# Patient Record
Sex: MALE | Race: WHITE | NOT HISPANIC OR LATINO | Employment: FULL TIME | URBAN - METROPOLITAN AREA
[De-identification: names, ages, dates, MRNs, and addresses within clinical notes are randomized per-mention and may not be internally consistent; named-entity substitution may affect disease eponyms.]

---

## 2017-07-17 ENCOUNTER — PATIENT OUTREACH (OUTPATIENT)
Dept: ADMINISTRATIVE | Facility: HOSPITAL | Age: 52
End: 2017-07-17

## 2017-07-17 ENCOUNTER — PATIENT MESSAGE (OUTPATIENT)
Dept: ADMINISTRATIVE | Facility: HOSPITAL | Age: 52
End: 2017-07-17

## 2017-11-02 ENCOUNTER — TELEPHONE (OUTPATIENT)
Dept: FAMILY MEDICINE | Facility: CLINIC | Age: 52
End: 2017-11-02

## 2017-11-02 ENCOUNTER — OFFICE VISIT (OUTPATIENT)
Dept: FAMILY MEDICINE | Facility: CLINIC | Age: 52
End: 2017-11-02
Payer: COMMERCIAL

## 2017-11-02 VITALS
WEIGHT: 172.69 LBS | HEIGHT: 69 IN | DIASTOLIC BLOOD PRESSURE: 84 MMHG | SYSTOLIC BLOOD PRESSURE: 120 MMHG | HEART RATE: 74 BPM | BODY MASS INDEX: 25.58 KG/M2 | RESPIRATION RATE: 18 BRPM | OXYGEN SATURATION: 98 %

## 2017-11-02 DIAGNOSIS — G62.9 NEUROPATHY: ICD-10-CM

## 2017-11-02 DIAGNOSIS — Z00.00 ANNUAL PHYSICAL EXAM: Primary | ICD-10-CM

## 2017-11-02 DIAGNOSIS — Z98.1 S/P CERVICAL SPINAL FUSION: ICD-10-CM

## 2017-11-02 DIAGNOSIS — Z13.220 SCREENING FOR LIPID DISORDERS: ICD-10-CM

## 2017-11-02 DIAGNOSIS — R25.2 MUSCLE CRAMPING: ICD-10-CM

## 2017-11-02 DIAGNOSIS — M25.50 ARTHRALGIA, UNSPECIFIED JOINT: ICD-10-CM

## 2017-11-02 PROBLEM — Z98.890 PERSONAL HISTORY OF SPINE SURGERY: Status: ACTIVE | Noted: 2017-11-02

## 2017-11-02 PROCEDURE — 90715 TDAP VACCINE 7 YRS/> IM: CPT | Mod: S$GLB,,, | Performed by: FAMILY MEDICINE

## 2017-11-02 PROCEDURE — 90471 IMMUNIZATION ADMIN: CPT | Mod: S$GLB,,, | Performed by: FAMILY MEDICINE

## 2017-11-02 PROCEDURE — 99999 PR PBB SHADOW E&M-EST. PATIENT-LVL III: CPT | Mod: PBBFAC,,, | Performed by: FAMILY MEDICINE

## 2017-11-02 PROCEDURE — 99396 PREV VISIT EST AGE 40-64: CPT | Mod: 25,S$GLB,, | Performed by: FAMILY MEDICINE

## 2017-11-02 RX ORDER — NAPROXEN 500 MG/1
500 TABLET ORAL 2 TIMES DAILY
Qty: 30 TABLET | Refills: 0 | Status: SHIPPED | OUTPATIENT
Start: 2017-11-02 | End: 2023-11-13

## 2017-11-02 RX ORDER — DICLOFENAC SODIUM 10 MG/G
2 GEL TOPICAL 4 TIMES DAILY
Qty: 100 G | Refills: 0 | Status: SHIPPED | OUTPATIENT
Start: 2017-11-02 | End: 2017-11-12

## 2017-11-02 RX ORDER — NAPROXEN SODIUM 220 MG
220 TABLET ORAL
COMMUNITY
End: 2017-11-02

## 2017-11-02 RX ORDER — CYCLOBENZAPRINE HCL 10 MG
10 TABLET ORAL NIGHTLY PRN
Qty: 30 TABLET | Refills: 0 | Status: SHIPPED | OUTPATIENT
Start: 2017-11-02 | End: 2017-11-12

## 2017-11-02 RX ORDER — LEVOCETIRIZINE DIHYDROCHLORIDE 5 MG/1
5 TABLET, FILM COATED ORAL NIGHTLY
Qty: 30 TABLET | Refills: 1 | Status: SHIPPED | OUTPATIENT
Start: 2017-11-02 | End: 2023-12-01

## 2017-11-02 NOTE — PROGRESS NOTES
Subjective:       Patient ID: Juarez Keane is a 52 y.o. male.    Chief Complaint: Annual Exam    HPI 52 year old male here for his annual exam.  Patient has a history of C5-C6 fusion from a work related injury. He has residual left arm pain and occasional numbness/tingling in his fingers. He states symptoms are worse at the end of day   Patient had a stress test at [facility] and CT calcium score was normal in 2017.   Patient had a colonoscopy done in 2016 by Andree Leger and patient had normal results.     Review of Systems   Constitutional: Negative for activity change and unexpected weight change.   HENT: Negative for hearing loss, rhinorrhea and trouble swallowing.    Eyes: Negative for discharge and visual disturbance.   Respiratory: Negative for chest tightness and wheezing.    Cardiovascular: Negative for chest pain and palpitations.   Gastrointestinal: Positive for diarrhea. Negative for blood in stool, constipation and vomiting.   Endocrine: Negative for polydipsia and polyuria.   Genitourinary: Negative for difficulty urinating, hematuria and urgency.   Musculoskeletal: Positive for arthralgias, joint swelling and neck pain.   Neurological: Positive for headaches. Negative for weakness.   Psychiatric/Behavioral: Positive for dysphoric mood. Negative for confusion.       Objective:      Vitals:    11/02/17 0820   BP: 120/84   Pulse: 74   Resp: 18     Physical Exam   Constitutional: He is oriented to person, place, and time. He appears well-developed and well-nourished. No distress.   HENT:   Mouth/Throat: Oropharynx is clear and moist. No oropharyngeal exudate.   Eyes: EOM are normal. Right eye exhibits no discharge. Left eye exhibits no discharge.   Neck: Normal range of motion.   Cardiovascular: Normal rate and regular rhythm.    Pulmonary/Chest: Effort normal. He has no wheezes.   Abdominal: Soft. There is no tenderness. There is no guarding.   Lymphadenopathy:     He has no cervical adenopathy.    Neurological: He is alert and oriented to person, place, and time.   Skin: He is not diaphoretic.   Psychiatric: He has a normal mood and affect. His behavior is normal. Judgment and thought content normal.   Vitals reviewed.      Assessment:       1. Annual physical exam    2. Arthralgia, unspecified joint    3. Screening for lipid disorders    4. Muscle cramping    5. Neuropathy    6. S/P cervical spinal fusion        Plan:         1. Annual exam: labs ordered. Encouraged low intensity exercise and well balanced diet.   2. History of cervical fusion, and polyarthralgia: likely due to patient's day to day occupation. I advised on nsaids prn pain and to contact the clinic if symptoms acutely worsen.   3. Screening: colonoscopy records requested. Immunization: tdap today.   Annual physical exam  -     CBC auto differential; Future; Expected date: 11/02/2017  -     Comprehensive metabolic panel; Future; Expected date: 11/02/2017    Arthralgia, unspecified joint    Screening for lipid disorders  -     Lipid panel; Future; Expected date: 11/02/2017    Muscle cramping  -     Comprehensive metabolic panel; Future; Expected date: 11/02/2017  -     TSH; Future; Expected date: 11/16/2017    Neuropathy  -     Comprehensive metabolic panel; Future; Expected date: 11/02/2017  -     TSH; Future; Expected date: 11/16/2017  -     Vitamin B12; Future; Expected date: 11/16/2017  -     Folate; Future; Expected date: 11/16/2017  -     Hemoglobin A1c; Future; Expected date: 11/02/2017    S/P cervical spinal fusion    Other orders  -     diclofenac sodium 1 % Gel; Apply 2 g topically 4 (four) times daily.  Dispense: 100 g; Refill: 0  -     cyclobenzaprine (FLEXERIL) 10 MG tablet; Take 1 tablet (10 mg total) by mouth nightly as needed for Muscle spasms.  Dispense: 30 tablet; Refill: 0  -     (In Office Administered) Tdap Vaccine  -     naproxen (NAPROSYN) 500 MG tablet; Take 1 tablet (500 mg total) by mouth 2 (two) times daily.   Dispense: 30 tablet; Refill: 0  -     levocetirizine (XYZAL) 5 MG tablet; Take 1 tablet (5 mg total) by mouth every evening.  Dispense: 30 tablet; Refill: 1      Return if symptoms worsen or fail to improve.

## 2017-11-02 NOTE — TELEPHONE ENCOUNTER
----- Message from Roma Keane sent at 11/2/2017  3:06 PM CDT -----  Pt is calling back to give information about medical release, Dr Cote (Card) fax is [phone] and Dr Leger (Gastro)  Fax [phone].

## 2018-02-05 PROBLEM — Z00.00 ANNUAL PHYSICAL EXAM: Status: RESOLVED | Noted: 2017-11-02 | Resolved: 2018-02-05

## 2018-11-30 ENCOUNTER — OFFICE VISIT (OUTPATIENT)
Dept: URGENT CARE | Facility: CLINIC | Age: 53
End: 2018-11-30
Payer: COMMERCIAL

## 2018-11-30 VITALS
DIASTOLIC BLOOD PRESSURE: 85 MMHG | HEART RATE: 82 BPM | HEIGHT: 69 IN | BODY MASS INDEX: 25.48 KG/M2 | OXYGEN SATURATION: 98 % | TEMPERATURE: 97 F | SYSTOLIC BLOOD PRESSURE: 134 MMHG | RESPIRATION RATE: 16 BRPM | WEIGHT: 172 LBS

## 2018-11-30 DIAGNOSIS — L03.012 PARONYCHIA OF FINGER OF LEFT HAND: Primary | ICD-10-CM

## 2018-11-30 PROCEDURE — 99214 OFFICE O/P EST MOD 30 MIN: CPT | Mod: 25,S$GLB,, | Performed by: NURSE PRACTITIONER

## 2018-11-30 PROCEDURE — 26010 DRAINAGE OF FINGER ABSCESS: CPT | Mod: LT,S$GLB,, | Performed by: NURSE PRACTITIONER

## 2018-11-30 PROCEDURE — 3008F BODY MASS INDEX DOCD: CPT | Mod: CPTII,S$GLB,, | Performed by: NURSE PRACTITIONER

## 2018-11-30 RX ORDER — CEPHALEXIN 500 MG/1
500 CAPSULE ORAL EVERY 12 HOURS
Qty: 40 CAPSULE | Refills: 0 | Status: SHIPPED | OUTPATIENT
Start: 2018-11-30 | End: 2018-12-10

## 2018-11-30 RX ORDER — MUPIROCIN 20 MG/G
OINTMENT TOPICAL
Qty: 22 G | Refills: 0 | Status: SHIPPED | OUTPATIENT
Start: 2018-11-30 | End: 2023-11-13

## 2018-11-30 NOTE — PATIENT INSTRUCTIONS
Please follow up with your Primary care provider within 2-5 days if your signs and symptoms have not resolved or worsen.     If your condition worsens or fails to improve we recommend that you receive another evaluation at the emergency room immediately or contact your primary medical clinic to discuss your concerns.    You must understand that you have received an Urgent Care treatment only and that you may be released before all of your medical problems are known or treated.   You, the patient, will arrange for follow up care as instructed.     You have been given an antibiotic to treat your condition today.  Please complete the antibiotic as directed on the bottle.     As with any antibiotics, use probiotics and/or high culture yogurt about 2 hours apart from the antibiotic and about 1 week after the antibiotic to replace the gut russell lost with antibiotic use.      If you are female and on BCP use additional methods to prevent pregnancy while on antibiotics and for one cycle after.         Paronychia of the Finger or Toe  Paronychia is an infection near a fingernail or toenail. It usually occurs when an opening in the cuticle or an ingrown toenail lets bacteria under the skin.  The infection will need to be drained if pus is present. If the infection has been caught early, you may need only antibiotic treatment. Healing will take about 1 to 2 weeks.  Home care  Follow these guidelines when caring for yourself at home:  · Clean and soak the toe or finger. Do this 2 times a day for the first 3 days. To do so:  ¨ Soak your foot or hand in a tub of warm water for 5 minutes. Or hold your toe or finger under a faucet of warm running water for 5 minutes.  ¨ Clean any crust away with soap and water using a cotton swab.  ¨ Put antibiotic ointment on the infected area.  · Change the dressing daily or any time it gets dirty.  · If you were given antibiotics, take them as directed until they are all gone.  · If your  infection is on a toe, wear comfortable shoes with a lot of toe room. You can also wear open-toed sandals while your toe heals.  · You may use over-the-counter medicine (acetaminophen or ibuprofen to help with pain, unless another medicine was prescribed. If you have chronic liver or kidney disease, talk with your healthcare provider before using these medicines. Also talk with your provider if you've had a stomach ulcer or GI (gastrointestinal) bleeding.  Prevention  The following can prevent paronychia:  · Avoid cutting or playing with your cuticles at home.  · Don't bite your nails.  · Don't suck on your thumbs or fingers.  Follow-up care  Follow up with your healthcare provider, or as advised.  When to seek medical advice  Call your healthcare provider right away if any of these occur:  · Redness, pain, or swelling of the finger or toe gets worse  · Red streaks in the skin leading away from the wound  · Pus or fluid draining from the nail area  · Fever of 100.4ºF (38ºC) or higher, or as directed by your provider  Date Last Reviewed: 8/1/2016  © 4454-7848 [employer]. [address] [place] [address], [place] [address]. All rights reserved. This information is not intended as a substitute for professional medical care. Always follow your healthcare professional's instructions.

## 2018-11-30 NOTE — PROGRESS NOTES
"Subjective:       Patient ID: Juarez Keane is a 53 y.o. male.    Vitals:  height is 5' 9" (1.753 m) and weight is 78 kg (172 lb). His oral temperature is 96.5 °F (35.8 °C). His blood pressure is 134/85 and his pulse is 82. His respiration is 16 and oxygen saturation is 98%.     Chief Complaint: Wound Infection    Hand Injury    His dominant hand is their left hand. The incident occurred 3 to 5 days ago. The incident occurred at home. There was no injury mechanism. Pain location: left index finger. The quality of the pain is described as aching. The pain is at a severity of 7/10. The pain is mild. The pain has been constant since the incident. Nothing aggravates the symptoms. He has tried nothing (soaked with warm water) for the symptoms.       Constitution: Negative for fatigue.   HENT: Negative for facial swelling and facial trauma.    Neck: Negative for neck stiffness.   Cardiovascular: Negative for chest trauma.   Eyes: Negative for eye trauma, double vision and blurred vision.   Gastrointestinal: Negative for abdominal trauma, abdominal pain and rectal bleeding.   Genitourinary: Negative for hematuria, genital trauma and pelvic pain.   Musculoskeletal: Negative for pain, trauma, joint swelling, abnormal ROM of joint and pain with walking.   Skin: Positive for wound and erythema. Negative for color change, abrasion and laceration.   Neurological: Negative for dizziness, history of vertigo, light-headedness, coordination disturbances, altered mental status and loss of consciousness.   Hematologic/Lymphatic: Negative for history of bleeding disorder.   Psychiatric/Behavioral: Negative for altered mental status.       Objective:      Physical Exam   Constitutional: He is oriented to person, place, and time. He appears well-developed and well-nourished.   HENT:   Head: Normocephalic and atraumatic. Head is without abrasion, without contusion and without laceration.   Right Ear: External ear normal.   Left " Ear: External ear normal.   Nose: Nose normal.   Mouth/Throat: Oropharynx is clear and moist.   Eyes: Conjunctivae, EOM and lids are normal. Pupils are equal, round, and reactive to light.   Neck: Trachea normal, full passive range of motion without pain and phonation normal. Neck supple.   Cardiovascular: Normal rate, regular rhythm and normal heart sounds.   Pulmonary/Chest: Effort normal and breath sounds normal. No stridor. No respiratory distress.   Musculoskeletal: Normal range of motion.        Hands:  Neurological: He is alert and oriented to person, place, and time.   Skin: Skin is warm, dry and intact. Capillary refill takes less than 2 seconds. Lesion (warmth to touch, edema and TTP  see diagram) noted. No abrasion, no bruising, no burn, no ecchymosis, no laceration and no rash noted. There is erythema.   Psychiatric: He has a normal mood and affect. His speech is normal and behavior is normal. Judgment and thought content normal. Cognition and memory are normal.   Nursing note and vitals reviewed.      Assessment:       1. Paronychia of finger of left hand        Plan:         Paronychia of finger of left hand  -     mupirocin (BACTROBAN) 2 % ointment; Apply to affected area 3 times daily x 7 days  Dispense: 22 g; Refill: 0  -     cephALEXin (KEFLEX) 500 MG capsule; Take 1 capsule (500 mg total) by mouth every 12 (twelve) hours. for 10 days  Dispense: 40 capsule; Refill: 0  -     Incision & Drainage      Patient Instructions   Please follow up with your Primary care provider within 2-5 days if your signs and symptoms have not resolved or worsen.     If your condition worsens or fails to improve we recommend that you receive another evaluation at the emergency room immediately or contact your primary medical clinic to discuss your concerns.    You must understand that you have received an Urgent Care treatment only and that you may be released before all of your medical problems are known or treated.    You, the patient, will arrange for follow up care as instructed.     You have been given an antibiotic to treat your condition today.  Please complete the antibiotic as directed on the bottle.     As with any antibiotics, use probiotics and/or high culture yogurt about 2 hours apart from the antibiotic and about 1 week after the antibiotic to replace the gut russell lost with antibiotic use.      If you are female and on BCP use additional methods to prevent pregnancy while on antibiotics and for one cycle after.         Paronychia of the Finger or Toe  Paronychia is an infection near a fingernail or toenail. It usually occurs when an opening in the cuticle or an ingrown toenail lets bacteria under the skin.  The infection will need to be drained if pus is present. If the infection has been caught early, you may need only antibiotic treatment. Healing will take about 1 to 2 weeks.  Home care  Follow these guidelines when caring for yourself at home:  · Clean and soak the toe or finger. Do this 2 times a day for the first 3 days. To do so:  ¨ Soak your foot or hand in a tub of warm water for 5 minutes. Or hold your toe or finger under a faucet of warm running water for 5 minutes.  ¨ Clean any crust away with soap and water using a cotton swab.  ¨ Put antibiotic ointment on the infected area.  · Change the dressing daily or any time it gets dirty.  · If you were given antibiotics, take them as directed until they are all gone.  · If your infection is on a toe, wear comfortable shoes with a lot of toe room. You can also wear open-toed sandals while your toe heals.  · You may use over-the-counter medicine (acetaminophen or ibuprofen to help with pain, unless another medicine was prescribed. If you have chronic liver or kidney disease, talk with your healthcare provider before using these medicines. Also talk with your provider if you've had a stomach ulcer or GI (gastrointestinal) bleeding.  Prevention  The following  can prevent paronychia:  · Avoid cutting or playing with your cuticles at home.  · Don't bite your nails.  · Don't suck on your thumbs or fingers.  Follow-up care  Follow up with your healthcare provider, or as advised.  When to seek medical advice  Call your healthcare provider right away if any of these occur:  · Redness, pain, or swelling of the finger or toe gets worse  · Red streaks in the skin leading away from the wound  · Pus or fluid draining from the nail area  · Fever of 100.4ºF (38ºC) or higher, or as directed by your provider  Date Last Reviewed: 8/1/2016  © 2791-3062 [employer]. [address] [place] [address], [place] [address]. All rights reserved. This information is not intended as a substitute for professional medical care. Always follow your healthcare professional's instructions.

## 2018-12-01 NOTE — PROCEDURES
"Incision & Drainage  Date/Time: 11/30/2018 2:50 PM  Performed by: She Stoll NP  Authorized by: She Stoll NP     Time out: Immediately prior to procedure a "time out" was called to verify the correct patient, procedure, equipment, support staff and site/side marked as required.    Consent Done?:  Yes (Verbal)    Type:  Abscess  Body area:  Upper extremity  Location details:  Left index finger  Scalpel size:  11  Incision type:  Single straight  Complexity:  Simple  Drainage:  Pus and bloody  Drainage amount:  Moderate  Wound treatment:  Wound left open  Patient tolerance:  Patient tolerated the procedure well with no immediate complications      "

## 2018-12-03 ENCOUNTER — TELEPHONE (OUTPATIENT)
Dept: URGENT CARE | Facility: CLINIC | Age: 53
End: 2018-12-03

## 2019-05-21 ENCOUNTER — OFFICE VISIT (OUTPATIENT)
Dept: URGENT CARE | Facility: CLINIC | Age: 54
End: 2019-05-21
Payer: COMMERCIAL

## 2019-05-21 VITALS
WEIGHT: 172 LBS | DIASTOLIC BLOOD PRESSURE: 93 MMHG | RESPIRATION RATE: 18 BRPM | BODY MASS INDEX: 25.48 KG/M2 | SYSTOLIC BLOOD PRESSURE: 142 MMHG | OXYGEN SATURATION: 99 % | HEART RATE: 74 BPM | TEMPERATURE: 98 F | HEIGHT: 69 IN

## 2019-05-21 DIAGNOSIS — M79.644 PAIN OF FINGER OF RIGHT HAND: Primary | ICD-10-CM

## 2019-05-21 DIAGNOSIS — Z51.89 VISIT FOR WOUND CHECK: ICD-10-CM

## 2019-05-21 PROCEDURE — 99214 OFFICE O/P EST MOD 30 MIN: CPT | Mod: S$GLB,,, | Performed by: NURSE PRACTITIONER

## 2019-05-21 PROCEDURE — 3008F PR BODY MASS INDEX (BMI) DOCUMENTED: ICD-10-PCS | Mod: CPTII,S$GLB,, | Performed by: NURSE PRACTITIONER

## 2019-05-21 PROCEDURE — 3008F BODY MASS INDEX DOCD: CPT | Mod: CPTII,S$GLB,, | Performed by: NURSE PRACTITIONER

## 2019-05-21 PROCEDURE — 99214 PR OFFICE/OUTPT VISIT, EST, LEVL IV, 30-39 MIN: ICD-10-PCS | Mod: S$GLB,,, | Performed by: NURSE PRACTITIONER

## 2019-05-21 RX ORDER — MUPIROCIN 20 MG/G
OINTMENT TOPICAL
Status: COMPLETED | OUTPATIENT
Start: 2019-05-21 | End: 2019-05-21

## 2019-05-21 RX ADMIN — MUPIROCIN: 20 OINTMENT TOPICAL at 07:05

## 2019-05-21 NOTE — PROGRESS NOTES
"Subjective:       Patient ID: Juarez Keane is a 54 y.o. male.    Vitals:  height is 5' 9" (1.753 m) and weight is 78 kg (172 lb). His temperature is 97.8 °F (36.6 °C). His blood pressure is 142/93 (abnormal) and his pulse is 74. His respiration is 18 and oxygen saturation is 99%.     Chief Complaint: Hand Pain    This is a 54 y.o. male who presents today with a chief complaint of cut the tip of his right index finger with a jig saw about a week and a half ago and patient wants his finger to be examined.      Hand Pain    The incident occurred more than 1 week ago. The incident occurred at home. Injury mechanism: a jig saw. The pain is present in the right fingers. Quality: sore/tender. The pain does not radiate. The pain is at a severity of 7/10. The pain is moderate. Pertinent negatives include no numbness or tingling. Exacerbated by: touching his finger tip. Treatments tried: mupirocin. The treatment provided no relief.       Constitution: Negative for chills and fever.   HENT: Negative for facial swelling and sore throat.    Neck: Negative for painful lymph nodes.   Eyes: Negative for eye itching and eyelid swelling.   Respiratory: Negative for cough.    Musculoskeletal: Negative for joint pain and joint swelling.   Skin: Positive for abrasion. Negative for color change, pale, rash, wound, laceration, lesion, skin thickening/induration, puncture wound, erythema, abscess, avulsion and hives.   Allergic/Immunologic: Negative for environmental allergies, immunocompromised state and hives.   Neurological: Negative for numbness.   Hematologic/Lymphatic: Negative for swollen lymph nodes.       Objective:      Physical Exam   Constitutional: He is oriented to person, place, and time. Vital signs are normal. He appears well-developed and well-nourished. He is active.  Non-toxic appearance. He does not have a sickly appearance. He does not appear ill. No distress.   HENT:   Head: Normocephalic and atraumatic. "   Right Ear: External ear normal.   Left Ear: External ear normal.   Nose: Nose normal.   Mouth/Throat: Uvula is midline, oropharynx is clear and moist and mucous membranes are normal.   Eyes: Conjunctivae and lids are normal.   Neck: Trachea normal and full passive range of motion without pain. Neck supple.   Cardiovascular: Normal rate, regular rhythm and normal heart sounds.   Pulses:       Radial pulses are 2+ on the right side, and 2+ on the left side.   Pulmonary/Chest: Effort normal and breath sounds normal. No respiratory distress.   Abdominal: Soft. Normal appearance and bowel sounds are normal. He exhibits no distension, no abdominal bruit, no pulsatile midline mass and no mass. There is no tenderness.   Musculoskeletal: Normal range of motion. He exhibits no edema.        Right hand: He exhibits laceration (old laceration noted to be healing appropriately. No surrounding erythema, warmth or active drainage.). He exhibits normal range of motion, no tenderness, no bony tenderness, normal two-point discrimination, normal capillary refill, no deformity and no swelling. Normal sensation noted. Decreased sensation is not present in the ulnar distribution, is not present in the medial distribution and is not present in the radial distribution. Normal strength noted. He exhibits no finger abduction, no thumb/finger opposition and no wrist extension trouble.        Hands:  Neurological: He is alert and oriented to person, place, and time. He has normal strength.   Skin: Skin is warm, dry and intact. Capillary refill takes less than 2 seconds. No rash noted. He is not diaphoretic. No erythema. No pallor.   Psychiatric: He has a normal mood and affect. His speech is normal and behavior is normal. Judgment and thought content normal. Cognition and memory are normal.   Nursing note and vitals reviewed.      Assessment:       1. Pain of finger of right hand    2. Visit for wound check        Plan:         Patient  presents to the urgent care for re-evaluation of finger injury. Patient was concerned of possible infection.  No surrounding erythema, warmth, or active drainage.  Patient advised to continue using Bactroban and keep in wound clean and dry. Patient agreed to treatment plan.  No acute distress noted prior to discharge.   Full ROM of all digits and wrist with sensation and strength intact and equal. Capillary refill <2 seconds. No tenderness over the proximal tendon sheath or pain with extension of digits, so I have a low suspicion for flexor tenosynovitis at this time.  No evidence of felon, paronychia, herpetic nickolas, or septic arthritis.    Patient wound cleaned and redressed with Bactroban.    Pain of finger of right hand  -     mupirocin 2 % ointment    Visit for wound check      Patient Instructions   If you were prescribed a narcotic or controlled medication, do not drive or operate heavy equipment or machinery while taking these medications.  You must understand that you've received an Urgent Care treatment only and that you may be released before all your medical problems are known or treated. You, the patient, will arrange for follow up care as instructed.  Follow up with your PCP or specialty clinic as directed within 2-5 days if not improved or as needed.  You can call [phone] to schedule an appointment with the appropriate provider.  If your condition worsens we recommend that you receive another evaluation at the emergency room immediately or contact your primary medical clinics after hours call service to discuss your concerns.  Please return here or go to the Emergency Department for any concerns or worsening of condition.      Wound Check, No Infection  Your wound is healing as expected. There are no signs of infection.   Home care  Continue to care for your wound as directed.  · Cover your wound with a bandage unless your healthcare provider tells you not to.  · Gently clean your wound with  soap and water when you shower.   · Unless told otherwise, avoid swimming and taking tub baths until your wound has healed.  Follow-up care  Follow up with your healthcare provider as advised.  · If you have sutures or staples, return as directed to have them removed. If they are not taken out on time, they may be harder to remove and scarring may be worse. Infection may develop.  · If surgical tape strips were used, you can remove them yourself if they have not fallen off by 10 days after they were applied.   When to seek medical advice  Call your healthcare provider right away if any of these occur:  · Fever of 100.4ºF (38ºC) or higher, or as directed by your health care provider  · Symptoms of a wound infection, including:  ¨ Redness or swelling around the wound  ¨ Warmth coming from the wound  ¨ New or worsening pain  ¨ Red streaks around the wound  ¨ Draining pus  Date Last Reviewed: 8/24/2015  © 8329-7446 [email or web address] [address] [place] [address], [place] [address]. All rights reserved. This information is not intended as a substitute for professional medical care. Always follow your healthcare professional's instructions.                musculoskeletal

## 2019-05-22 NOTE — PATIENT INSTRUCTIONS
If you were prescribed a narcotic or controlled medication, do not drive or operate heavy equipment or machinery while taking these medications.  You must understand that you've received an Urgent Care treatment only and that you may be released before all your medical problems are known or treated. You, the patient, will arrange for follow up care as instructed.  Follow up with your PCP or specialty clinic as directed within 2-5 days if not improved or as needed.  You can call [phone] to schedule an appointment with the appropriate provider.  If your condition worsens we recommend that you receive another evaluation at the emergency room immediately or contact your primary medical clinics after hours call service to discuss your concerns.  Please return here or go to the Emergency Department for any concerns or worsening of condition.      Wound Check, No Infection  Your wound is healing as expected. There are no signs of infection.   Home care  Continue to care for your wound as directed.  · Cover your wound with a bandage unless your healthcare provider tells you not to.  · Gently clean your wound with soap and water when you shower.   · Unless told otherwise, avoid swimming and taking tub baths until your wound has healed.  Follow-up care  Follow up with your healthcare provider as advised.  · If you have sutures or staples, return as directed to have them removed. If they are not taken out on time, they may be harder to remove and scarring may be worse. Infection may develop.  · If surgical tape strips were used, you can remove them yourself if they have not fallen off by 10 days after they were applied.   When to seek medical advice  Call your healthcare provider right away if any of these occur:  · Fever of 100.4ºF (38ºC) or higher, or as directed by your health care provider  · Symptoms of a wound infection, including:  ¨ Redness or swelling around the wound  ¨ Warmth coming from the wound  ¨ New or  worsening pain  ¨ Red streaks around the wound  ¨ Draining pus  Date Last Reviewed: 8/24/2015  © 5118-6472 [employer]. [address] [place] [address], [place] [address]. All rights reserved. This information is not intended as a substitute for professional medical care. Always follow your healthcare professional's instructions.

## 2019-05-24 ENCOUNTER — TELEPHONE (OUTPATIENT)
Dept: URGENT CARE | Facility: CLINIC | Age: 54
End: 2019-05-24

## 2020-01-23 ENCOUNTER — OFFICE VISIT (OUTPATIENT)
Dept: FAMILY MEDICINE | Facility: CLINIC | Age: 55
End: 2020-01-23
Payer: COMMERCIAL

## 2020-01-23 VITALS
OXYGEN SATURATION: 98 % | BODY MASS INDEX: 26.15 KG/M2 | SYSTOLIC BLOOD PRESSURE: 132 MMHG | WEIGHT: 176.56 LBS | HEIGHT: 69 IN | TEMPERATURE: 98 F | DIASTOLIC BLOOD PRESSURE: 86 MMHG | HEART RATE: 79 BPM

## 2020-01-23 DIAGNOSIS — Z00.00 ANNUAL PHYSICAL EXAM: ICD-10-CM

## 2020-01-23 DIAGNOSIS — Z98.1 S/P CERVICAL SPINAL FUSION: ICD-10-CM

## 2020-01-23 DIAGNOSIS — Z23 NEED FOR VACCINATION: ICD-10-CM

## 2020-01-23 DIAGNOSIS — G62.9 NEUROPATHY: Primary | ICD-10-CM

## 2020-01-23 LAB
ALBUMIN SERPL BCP-MCNC: 4.3 G/DL (ref 3.5–5.2)
ALP SERPL-CCNC: 99 U/L (ref 55–135)
ALT SERPL W/O P-5'-P-CCNC: 18 U/L (ref 10–44)
ANION GAP SERPL CALC-SCNC: 9 MMOL/L (ref 8–16)
AST SERPL-CCNC: 20 U/L (ref 10–40)
BASOPHILS # BLD AUTO: 0.05 K/UL (ref 0–0.2)
BASOPHILS NFR BLD: 1 % (ref 0–1.9)
BILIRUB SERPL-MCNC: 0.6 MG/DL (ref 0.1–1)
BUN SERPL-MCNC: 14 MG/DL (ref 6–20)
CALCIUM SERPL-MCNC: 9.4 MG/DL (ref 8.7–10.5)
CHLORIDE SERPL-SCNC: 104 MMOL/L (ref 95–110)
CHOLEST SERPL-MCNC: 190 MG/DL (ref 120–199)
CHOLEST/HDLC SERPL: 3.7 {RATIO} (ref 2–5)
CO2 SERPL-SCNC: 27 MMOL/L (ref 23–29)
COMPLEXED PSA SERPL-MCNC: 0.34 NG/ML (ref 0–4)
CREAT SERPL-MCNC: 0.9 MG/DL (ref 0.5–1.4)
DIFFERENTIAL METHOD: NORMAL
EOSINOPHIL # BLD AUTO: 0.2 K/UL (ref 0–0.5)
EOSINOPHIL NFR BLD: 4.1 % (ref 0–8)
ERYTHROCYTE [DISTWIDTH] IN BLOOD BY AUTOMATED COUNT: 12.7 % (ref 11.5–14.5)
EST. GFR  (AFRICAN AMERICAN): >60 ML/MIN/1.73 M^2
EST. GFR  (NON AFRICAN AMERICAN): >60 ML/MIN/1.73 M^2
GLUCOSE SERPL-MCNC: 108 MG/DL (ref 70–110)
HCT VFR BLD AUTO: 51.3 % (ref 40–54)
HDLC SERPL-MCNC: 51 MG/DL (ref 40–75)
HDLC SERPL: 26.8 % (ref 20–50)
HGB BLD-MCNC: 16.8 G/DL (ref 14–18)
IMM GRANULOCYTES # BLD AUTO: 0.01 K/UL (ref 0–0.04)
IMM GRANULOCYTES NFR BLD AUTO: 0.2 % (ref 0–0.5)
LDLC SERPL CALC-MCNC: 124.8 MG/DL (ref 63–159)
LYMPHOCYTES # BLD AUTO: 1.5 K/UL (ref 1–4.8)
LYMPHOCYTES NFR BLD: 30.9 % (ref 18–48)
MCH RBC QN AUTO: 29.8 PG (ref 27–31)
MCHC RBC AUTO-ENTMCNC: 32.7 G/DL (ref 32–36)
MCV RBC AUTO: 91 FL (ref 82–98)
MONOCYTES # BLD AUTO: 0.4 K/UL (ref 0.3–1)
MONOCYTES NFR BLD: 8.8 % (ref 4–15)
NEUTROPHILS # BLD AUTO: 2.7 K/UL (ref 1.8–7.7)
NEUTROPHILS NFR BLD: 55 % (ref 38–73)
NONHDLC SERPL-MCNC: 139 MG/DL
NRBC BLD-RTO: 0 /100 WBC
PLATELET # BLD AUTO: 238 K/UL (ref 150–350)
PMV BLD AUTO: 10.3 FL (ref 9.2–12.9)
POTASSIUM SERPL-SCNC: 4.4 MMOL/L (ref 3.5–5.1)
PROT SERPL-MCNC: 7.4 G/DL (ref 6–8.4)
RBC # BLD AUTO: 5.64 M/UL (ref 4.6–6.2)
SODIUM SERPL-SCNC: 140 MMOL/L (ref 136–145)
T4 FREE SERPL-MCNC: 0.94 NG/DL (ref 0.71–1.51)
TRIGL SERPL-MCNC: 71 MG/DL (ref 30–150)
TSH SERPL DL<=0.005 MIU/L-ACNC: 1.88 UIU/ML (ref 0.4–4)
WBC # BLD AUTO: 4.86 K/UL (ref 3.9–12.7)

## 2020-01-23 PROCEDURE — 90471 TDAP VACCINE GREATER THAN OR EQUAL TO 7YO IM: ICD-10-PCS | Mod: S$GLB,,, | Performed by: INTERNAL MEDICINE

## 2020-01-23 PROCEDURE — 36415 COLL VENOUS BLD VENIPUNCTURE: CPT

## 2020-01-23 PROCEDURE — 99999 PR PBB SHADOW E&M-EST. PATIENT-LVL III: ICD-10-PCS | Mod: PBBFAC,,, | Performed by: INTERNAL MEDICINE

## 2020-01-23 PROCEDURE — 85025 COMPLETE CBC W/AUTO DIFF WBC: CPT

## 2020-01-23 PROCEDURE — 90471 IMMUNIZATION ADMIN: CPT | Mod: S$GLB,,, | Performed by: INTERNAL MEDICINE

## 2020-01-23 PROCEDURE — 80061 LIPID PANEL: CPT

## 2020-01-23 PROCEDURE — 99999 PR PBB SHADOW E&M-EST. PATIENT-LVL III: CPT | Mod: PBBFAC,,, | Performed by: INTERNAL MEDICINE

## 2020-01-23 PROCEDURE — 80053 COMPREHEN METABOLIC PANEL: CPT

## 2020-01-23 PROCEDURE — 84439 ASSAY OF FREE THYROXINE: CPT

## 2020-01-23 PROCEDURE — 90715 TDAP VACCINE GREATER THAN OR EQUAL TO 7YO IM: ICD-10-PCS | Mod: S$GLB,,, | Performed by: INTERNAL MEDICINE

## 2020-01-23 PROCEDURE — 99396 PR PREVENTIVE VISIT,EST,40-64: ICD-10-PCS | Mod: 25,S$GLB,, | Performed by: INTERNAL MEDICINE

## 2020-01-23 PROCEDURE — 84443 ASSAY THYROID STIM HORMONE: CPT

## 2020-01-23 PROCEDURE — 99396 PREV VISIT EST AGE 40-64: CPT | Mod: 25,S$GLB,, | Performed by: INTERNAL MEDICINE

## 2020-01-23 PROCEDURE — 84153 ASSAY OF PSA TOTAL: CPT

## 2020-01-23 PROCEDURE — 90715 TDAP VACCINE 7 YRS/> IM: CPT | Mod: S$GLB,,, | Performed by: INTERNAL MEDICINE

## 2020-01-23 NOTE — PROGRESS NOTES
[facility] Note    Chief Complaint      Chief Complaint   Patient presents with    Annual Exam       History of Present Illness      Juarez Keane is a 54 y.o. male with chronic conditions of chronic neck and left arm pain who presents today for: re-establish care from  and annual preventative visit.  Chronic neck pain, chronic left arm pain: S/p anterior fusion with Dr. Jacobo.  Still with neuropathic pain in left shoulder and arm.  Has not seen anyone for this since surgery.    Has had cardiac workup recently with Dr. Cote for family history.  Had normal stress test and ct calcium score.  Diet: Prepares own food mostly.  Limiting fatty foods and carbs.  Needs to drink more water.  Needs to cut out coke.    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    Denies drinking and driving, drinking more than 4 drinks on occasion, drug use.     Flu shot declines.  Td due.  Shingles vaccine due age 60.  Pneumonia vaccine due age 65.  Cscope 2017, Dr. Leger, no polyps, 7-10 yr interval.    Past Medical History:  Past Medical History:   Diagnosis Date    Degenerative disc disease     GERD (gastroesophageal reflux disease)     Optic neuritis, right        Past Surgical History:   has a past surgical history that includes Spine surgery; Bilateral knee arthroscopic; and Hernia repair.    Family History:  family history includes Cancer (age of onset: 68) in his father; Colon cancer in his maternal grandmother; Hypertension in his mother; Neuropathy in his mother; Throat cancer in his father.     Social History:  Social History     Tobacco Use    Smoking status: Never Smoker    Smokeless tobacco: Never Used   Substance Use Topics    Alcohol use: Yes     Comment: occasionally    Drug use: No       Review of Systems   Constitutional: Negative for chills, fever and malaise/fatigue.   Respiratory: Negative for shortness of breath.    Cardiovascular: Negative for chest pain.  "  Gastrointestinal: Negative for constipation, diarrhea, nausea and vomiting.   Skin: Negative for rash.   Neurological: Negative for weakness.        Medications:  Outpatient Encounter Medications as of 1/23/2020   Medication Sig Dispense Refill    diclofenac sodium 1 % Gel Apply 2 g topically 4 (four) times daily. 100 g 0    ipratropium (ATROVENT) 0.06 % nasal spray 2 sprays by Nasal route 4 (four) times daily. (Patient not taking: Reported on 1/23/2020) 15 mL 12    levocetirizine (XYZAL) 5 MG tablet Take 1 tablet (5 mg total) by mouth every evening. 30 tablet 1    mupirocin (BACTROBAN) 2 % ointment Apply to affected area 3 times daily x 7 days (Patient not taking: Reported on 1/23/2020) 22 g 0    naproxen (NAPROSYN) 500 MG tablet Take 1 tablet (500 mg total) by mouth 2 (two) times daily. (Patient not taking: Reported on 1/23/2020) 30 tablet 0     No facility-administered encounter medications on file as of 1/23/2020.        Allergies:  Review of patient's allergies indicates:   Allergen Reactions    Sulfa (sulfonamide antibiotics)        Health Maintenance:  Immunization History   Administered Date(s) Administered    Tdap 11/02/2017      Health Maintenance   Topic Date Due    Hepatitis C Screening  1965    Lipid Panel  11/02/2022    Colonoscopy  03/29/2026    TETANUS VACCINE  11/02/2027        Physical Exam      Vital Signs  Temp: 98.1 °F (36.7 °C)  Temp src: Oral  Pulse: 79  SpO2: 98 %  BP: 132/86  BP Location: Left arm  Patient Position: Sitting  Pain Score: 0-No pain  Height and Weight  Height: 5' 9" (175.3 cm)  Weight: 80.1 kg (176 lb 9.4 oz)  BSA (Calculated - sq m): 1.97 sq meters  BMI (Calculated): 26.1  Weight in (lb) to have BMI = 25: 168.9]    Physical Exam   Constitutional: He appears well-developed and well-nourished.   HENT:   Head: Normocephalic and atraumatic.   Right Ear: External ear normal.   Left Ear: External ear normal.   Mouth/Throat: Oropharynx is clear and moist.   Eyes: " Pupils are equal, round, and reactive to light. Conjunctivae and EOM are normal.   Cardiovascular: Normal rate, regular rhythm, normal heart sounds and intact distal pulses.   No murmur heard.  Pulmonary/Chest: Effort normal and breath sounds normal. He has no wheezes. He has no rales.   Abdominal: Soft. Bowel sounds are normal. He exhibits no distension and no abdominal bruit. There is no hepatosplenomegaly. There is no tenderness.   Vitals reviewed.       Laboratory:  CBC:  Recent Labs   Lab 11/02/17  0940   WBC 4.87   RBC 5.40   Hemoglobin 16.0   Hematocrit 48.0   Platelets 223   Mean Corpuscular Volume 89   Mean Corpuscular Hemoglobin 29.6   Mean Corpuscular Hemoglobin Conc 33.3     CMP:  Recent Labs   Lab 11/02/17  0940   Glucose 106   Calcium 9.2   Albumin 4.6   Total Protein 7.6   Sodium 140   Potassium 4.6   CO2 31 H   Chloride 100   BUN, Bld 15   Alkaline Phosphatase 90   ALT 36   AST 24   Total Bilirubin 0.7     URINALYSIS:       LIPIDS:  Recent Labs   Lab 11/02/17  0940   TSH 1.330   HDL 55   Cholesterol 199   Triglycerides 108   LDL Cholesterol 122.4   Hdl/Cholesterol Ratio 27.6   Non-HDL Cholesterol 144   Total Cholesterol/HDL Ratio 3.6     TSH:  Recent Labs   Lab 11/02/17  0940   TSH 1.330     A1C:  Recent Labs   Lab 11/02/17  0940   Hemoglobin A1C 5.4       Assessment/Plan     Juarez Keane is a 54 y.o.male with:    1. Annual physical exam  - CBC auto differential  - Comprehensive metabolic panel  - Lipid panel  - TSH  - T4, free  - PSA, Screening  Discussed diet and exercise, vaccines and cancer screening, risk factors.  Screening labs ordered.     2. Neuropathy  - CBC auto differential  - Comprehensive metabolic panel  - Lipid panel  - TSH  - T4, free  - PSA, Screening  May need neurology evaluation.  Pt will let me know when he's ready.      3. S/P cervical spinal fusion       Chronic conditions status updated as per HPI.  Other than changes above, cont current medications and maintain  follow up with specialists.  Return to clinic in 12 months.    Kris Ponce MD  [facility]

## 2020-11-20 ENCOUNTER — OFFICE VISIT (OUTPATIENT)
Dept: URGENT CARE | Facility: CLINIC | Age: 55
End: 2020-11-20
Payer: COMMERCIAL

## 2020-11-20 VITALS
OXYGEN SATURATION: 98 % | HEART RATE: 100 BPM | BODY MASS INDEX: 26.07 KG/M2 | HEIGHT: 69 IN | RESPIRATION RATE: 18 BRPM | DIASTOLIC BLOOD PRESSURE: 78 MMHG | TEMPERATURE: 98 F | WEIGHT: 176 LBS | SYSTOLIC BLOOD PRESSURE: 145 MMHG

## 2020-11-20 DIAGNOSIS — L03.011 ACUTE PARONYCHIA OF RIGHT THUMB: Primary | ICD-10-CM

## 2020-11-20 PROCEDURE — 99214 PR OFFICE/OUTPT VISIT, EST, LEVL IV, 30-39 MIN: ICD-10-PCS | Mod: S$GLB,,, | Performed by: PHYSICIAN ASSISTANT

## 2020-11-20 PROCEDURE — 3008F BODY MASS INDEX DOCD: CPT | Mod: CPTII,S$GLB,, | Performed by: PHYSICIAN ASSISTANT

## 2020-11-20 PROCEDURE — 99214 OFFICE O/P EST MOD 30 MIN: CPT | Mod: S$GLB,,, | Performed by: PHYSICIAN ASSISTANT

## 2020-11-20 PROCEDURE — 1125F PR PAIN SEVERITY QUANTIFIED, PAIN PRESENT: ICD-10-PCS | Mod: S$GLB,,, | Performed by: PHYSICIAN ASSISTANT

## 2020-11-20 PROCEDURE — 1125F AMNT PAIN NOTED PAIN PRSNT: CPT | Mod: S$GLB,,, | Performed by: PHYSICIAN ASSISTANT

## 2020-11-20 PROCEDURE — 3008F PR BODY MASS INDEX (BMI) DOCUMENTED: ICD-10-PCS | Mod: CPTII,S$GLB,, | Performed by: PHYSICIAN ASSISTANT

## 2020-11-20 RX ORDER — CLINDAMYCIN HYDROCHLORIDE 300 MG/1
300 CAPSULE ORAL EVERY 6 HOURS
Qty: 20 CAPSULE | Refills: 0 | Status: SHIPPED | OUTPATIENT
Start: 2020-11-20 | End: 2020-11-25

## 2020-11-20 NOTE — PROGRESS NOTES
"Subjective:       Patient ID: Juarez Keane is a 55 y.o. male.    Vitals:  height is 5' 9" (1.753 m) and weight is 79.8 kg (176 lb). His tympanic temperature is 98 °F (36.7 °C). His blood pressure is 145/78 (abnormal) and his pulse is 100. His respiration is 18 and oxygen saturation is 98%.     Chief Complaint: Hand Pain    This is a 55 y.o. male who presents today with a chief complaint of right thumb pain and swelling for 3 days. No injury mechanism; patient suspects an ingrown nail.      Hand Pain   The incident occurred 3 to 5 days ago. The incident occurred at home. There was no injury mechanism. Pain location: right thumb. The quality of the pain is described as aching. The pain does not radiate. The pain is at a severity of 4/10. The pain is mild. The pain has been constant since the incident. Pertinent negatives include no chest pain, muscle weakness, numbness or tingling. Exacerbated by: touching it. He has tried NSAIDs for the symptoms. The treatment provided mild relief.       Constitution: Negative for chills and fever.   HENT: Negative for facial swelling and sore throat.    Neck: Negative for painful lymph nodes.   Cardiovascular: Negative for chest pain.   Eyes: Negative for eye itching and eyelid swelling.   Respiratory: Negative for cough.    Musculoskeletal: Negative for joint pain and joint swelling.   Skin: Negative for color change, pale, rash, wound, abrasion, laceration, lesion, skin thickening/induration, puncture wound, erythema, abscess, avulsion and hives.        Possible ingrown nail   Allergic/Immunologic: Negative for environmental allergies, immunocompromised state and hives.   Neurological: Negative for numbness.   Hematologic/Lymphatic: Negative for swollen lymph nodes.       Objective:      Physical Exam   Constitutional: He is oriented to person, place, and time. He appears well-developed.   HENT:   Head: Normocephalic and atraumatic. Head is without abrasion, without " contusion and without laceration.   Ears:   Right Ear: External ear normal.   Left Ear: External ear normal.   Nose: Nose normal.   Mouth/Throat: Oropharynx is clear and moist and mucous membranes are normal.   Eyes: Pupils are equal, round, and reactive to light. Conjunctivae, EOM and lids are normal.   Neck: Full passive range of motion without pain and phonation normal. Neck supple.   Cardiovascular: Normal rate, regular rhythm and normal heart sounds.   Pulmonary/Chest: Effort normal. No respiratory distress.   Musculoskeletal: Normal range of motion.        Hands:    Neurological: He is alert and oriented to person, place, and time.   Skin: Skin is warm, dry, intact, no rash and abscessed. Capillary refill takes less than 2 seconds. abrasion, burn, bruising, erythema and ecchymosisPsychiatric: His speech is normal and behavior is normal. Judgment and thought content normal.   Nursing note and vitals reviewed.        Assessment:       1. Acute paronychia of right thumb        Plan:         Acute paronychia of right thumb  -     clindamycin (CLEOCIN) 300 MG capsule; Take 1 capsule (300 mg total) by mouth every 6 (six) hours. for 5 days  Dispense: 20 capsule; Refill: 0           Warm water soaks 3 times a day and start antibiotics.    Patient Instructions     Paronychia of the Finger or Toe  Paronychia is an infection near a fingernail or toenail. It usually occurs when an opening in the cuticle or an ingrown toenail lets bacteria under the skin.  The infection will need to be drained if pus is present. If the infection has been caught early, you may need only antibiotic treatment. Healing will take about 1 to 2 weeks.  Home care  Follow these guidelines when caring for yourself at home:  · Clean and soak the toe or finger. Do this 2 times a day for the first 3 days. To do so:  ¨ Soak your foot or hand in a tub of warm water for 5 minutes. Or hold your toe or finger under a faucet of warm running water for 5  minutes.  ¨ Clean any crust away with soap and water using a cotton swab.  ¨ Put antibiotic ointment on the infected area.  · Change the dressing daily or any time it gets dirty.  · If you were given antibiotics, take them as directed until they are all gone.  · If your infection is on a toe, wear comfortable shoes with a lot of toe room. You can also wear open-toed sandals while your toe heals.  · You may use over-the-counter medicine (acetaminophen or ibuprofen to help with pain, unless another medicine was prescribed. If you have chronic liver or kidney disease, talk with your healthcare provider before using these medicines. Also talk with your provider if you've had a stomach ulcer or GI (gastrointestinal) bleeding.  Prevention  The following can prevent paronychia:  · Avoid cutting or playing with your cuticles at home.  · Don't bite your nails.  · Don't suck on your thumbs or fingers.  Follow-up care  Follow up with your healthcare provider, or as advised.  When to seek medical advice  Call your healthcare provider right away if any of these occur:  · Redness, pain, or swelling of the finger or toe gets worse  · Red streaks in the skin leading away from the wound  · Pus or fluid draining from the nail area  · Fever of 100.4ºF (38ºC) or higher, or as directed by your provider  Date Last Reviewed: 8/1/2016  © 5337-9690 [employer]. [address] [place] [address]. All rights reserved. This information is not intended as a substitute for professional medical care. Always follow your healthcare professional's instructions.      Please be aware your blood pressure was slightly elevated today -  Make sure to take your blood pressure medicines, eat a low salt diet and recheck your blood pressure to make sure it is not getting too elevated ( greater than 160/100). Also make sure to let your doctor know about the elevated reading.      You must understand that you've received an Urgent Care  treatment only and that you may be released before all your medical problems are known or treated. You, the patient, will arrange for follow up care as instructed.    Follow up with your PCP or specialty clinic as directed in the next 1-2 weeks if not improved or as needed. You can call [phone] to schedule an appointment with the appropriate provider.    If your condition worsens we recommend that you receive another evaluation at the emergency room immediately or contact your primary medical clinic's after hours call service to discuss your concerns.    Please go to the Emergency Department for any concerns or worsening of condition.

## 2020-11-20 NOTE — PATIENT INSTRUCTIONS
Paronychia of the Finger or Toe  Paronychia is an infection near a fingernail or toenail. It usually occurs when an opening in the cuticle or an ingrown toenail lets bacteria under the skin.  The infection will need to be drained if pus is present. If the infection has been caught early, you may need only antibiotic treatment. Healing will take about 1 to 2 weeks.  Home care  Follow these guidelines when caring for yourself at home:  · Clean and soak the toe or finger. Do this 2 times a day for the first 3 days. To do so:  ¨ Soak your foot or hand in a tub of warm water for 5 minutes. Or hold your toe or finger under a faucet of warm running water for 5 minutes.  ¨ Clean any crust away with soap and water using a cotton swab.  ¨ Put antibiotic ointment on the infected area.  · Change the dressing daily or any time it gets dirty.  · If you were given antibiotics, take them as directed until they are all gone.  · If your infection is on a toe, wear comfortable shoes with a lot of toe room. You can also wear open-toed sandals while your toe heals.  · You may use over-the-counter medicine (acetaminophen or ibuprofen to help with pain, unless another medicine was prescribed. If you have chronic liver or kidney disease, talk with your healthcare provider before using these medicines. Also talk with your provider if you've had a stomach ulcer or GI (gastrointestinal) bleeding.  Prevention  The following can prevent paronychia:  · Avoid cutting or playing with your cuticles at home.  · Don't bite your nails.  · Don't suck on your thumbs or fingers.  Follow-up care  Follow up with your healthcare provider, or as advised.  When to seek medical advice  Call your healthcare provider right away if any of these occur:  · Redness, pain, or swelling of the finger or toe gets worse  · Red streaks in the skin leading away from the wound  · Pus or fluid draining from the nail area  · Fever of 100.4ºF (38ºC) or higher, or as directed  by your provider  Date Last Reviewed: 8/1/2016  © 4356-6886 [employer]. [address] [place] [address], [place] [address]. All rights reserved. This information is not intended as a substitute for professional medical care. Always follow your healthcare professional's instructions.      Please be aware your blood pressure was slightly elevated today -  Make sure to take your blood pressure medicines, eat a low salt diet and recheck your blood pressure to make sure it is not getting too elevated ( greater than 160/100). Also make sure to let your doctor know about the elevated reading.      You must understand that you've received an Urgent Care treatment only and that you may be released before all your medical problems are known or treated. You, the patient, will arrange for follow up care as instructed.    Follow up with your PCP or specialty clinic as directed in the next 1-2 weeks if not improved or as needed. You can call [phone] to schedule an appointment with the appropriate provider.    If your condition worsens we recommend that you receive another evaluation at the emergency room immediately or contact your primary medical clinic's after hours call service to discuss your concerns.    Please go to the Emergency Department for any concerns or worsening of condition.

## 2020-11-23 ENCOUNTER — TELEPHONE (OUTPATIENT)
Dept: URGENT CARE | Facility: CLINIC | Age: 55
End: 2020-11-23

## 2020-11-23 NOTE — TELEPHONE ENCOUNTER
Courtesy call DOS 11/20/2020.  Patient states thumb is getting better. If symptoms persist, patient could either return to urgent care or follow-up with PCP.

## 2021-03-11 ENCOUNTER — IMMUNIZATION (OUTPATIENT)
Dept: PRIMARY CARE CLINIC | Facility: CLINIC | Age: 56
End: 2021-03-11

## 2021-03-11 DIAGNOSIS — Z23 NEED FOR VACCINATION: Primary | ICD-10-CM

## 2021-04-05 ENCOUNTER — PATIENT MESSAGE (OUTPATIENT)
Dept: ADMINISTRATIVE | Facility: HOSPITAL | Age: 56
End: 2021-04-05

## 2021-07-07 ENCOUNTER — PATIENT MESSAGE (OUTPATIENT)
Dept: ADMINISTRATIVE | Facility: HOSPITAL | Age: 56
End: 2021-07-07

## 2021-11-03 ENCOUNTER — OFFICE VISIT (OUTPATIENT)
Dept: FAMILY MEDICINE | Facility: CLINIC | Age: 56
End: 2021-11-03
Payer: COMMERCIAL

## 2021-11-03 VITALS
OXYGEN SATURATION: 98 % | SYSTOLIC BLOOD PRESSURE: 126 MMHG | BODY MASS INDEX: 26.64 KG/M2 | DIASTOLIC BLOOD PRESSURE: 88 MMHG | WEIGHT: 179.88 LBS | TEMPERATURE: 98 F | HEIGHT: 69 IN | HEART RATE: 75 BPM

## 2021-11-03 DIAGNOSIS — M25.50 ARTHRALGIA, UNSPECIFIED JOINT: ICD-10-CM

## 2021-11-03 DIAGNOSIS — Z00.00 ANNUAL PHYSICAL EXAM: Primary | ICD-10-CM

## 2021-11-03 PROCEDURE — 99396 PR PREVENTIVE VISIT,EST,40-64: ICD-10-PCS | Mod: S$GLB,,, | Performed by: INTERNAL MEDICINE

## 2021-11-03 PROCEDURE — 3008F BODY MASS INDEX DOCD: CPT | Mod: CPTII,S$GLB,, | Performed by: INTERNAL MEDICINE

## 2021-11-03 PROCEDURE — 1160F PR REVIEW ALL MEDS BY PRESCRIBER/CLIN PHARMACIST DOCUMENTED: ICD-10-PCS | Mod: CPTII,S$GLB,, | Performed by: INTERNAL MEDICINE

## 2021-11-03 PROCEDURE — 1159F PR MEDICATION LIST DOCUMENTED IN MEDICAL RECORD: ICD-10-PCS | Mod: CPTII,S$GLB,, | Performed by: INTERNAL MEDICINE

## 2021-11-03 PROCEDURE — 3079F DIAST BP 80-89 MM HG: CPT | Mod: CPTII,S$GLB,, | Performed by: INTERNAL MEDICINE

## 2021-11-03 PROCEDURE — 3074F SYST BP LT 130 MM HG: CPT | Mod: CPTII,S$GLB,, | Performed by: INTERNAL MEDICINE

## 2021-11-03 PROCEDURE — 1160F RVW MEDS BY RX/DR IN RCRD: CPT | Mod: CPTII,S$GLB,, | Performed by: INTERNAL MEDICINE

## 2021-11-03 PROCEDURE — 3079F PR MOST RECENT DIASTOLIC BLOOD PRESSURE 80-89 MM HG: ICD-10-PCS | Mod: CPTII,S$GLB,, | Performed by: INTERNAL MEDICINE

## 2021-11-03 PROCEDURE — 3008F PR BODY MASS INDEX (BMI) DOCUMENTED: ICD-10-PCS | Mod: CPTII,S$GLB,, | Performed by: INTERNAL MEDICINE

## 2021-11-03 PROCEDURE — 99999 PR PBB SHADOW E&M-EST. PATIENT-LVL IV: ICD-10-PCS | Mod: PBBFAC,,, | Performed by: INTERNAL MEDICINE

## 2021-11-03 PROCEDURE — 99999 PR PBB SHADOW E&M-EST. PATIENT-LVL IV: CPT | Mod: PBBFAC,,, | Performed by: INTERNAL MEDICINE

## 2021-11-03 PROCEDURE — 1159F MED LIST DOCD IN RCRD: CPT | Mod: CPTII,S$GLB,, | Performed by: INTERNAL MEDICINE

## 2021-11-03 PROCEDURE — 99396 PREV VISIT EST AGE 40-64: CPT | Mod: S$GLB,,, | Performed by: INTERNAL MEDICINE

## 2021-11-03 PROCEDURE — 3074F PR MOST RECENT SYSTOLIC BLOOD PRESSURE < 130 MM HG: ICD-10-PCS | Mod: CPTII,S$GLB,, | Performed by: INTERNAL MEDICINE

## 2021-11-12 ENCOUNTER — PATIENT MESSAGE (OUTPATIENT)
Dept: PRIMARY CARE CLINIC | Facility: CLINIC | Age: 56
End: 2021-11-12
Payer: COMMERCIAL

## 2022-01-13 ENCOUNTER — CLINICAL SUPPORT (OUTPATIENT)
Dept: OTHER | Facility: CLINIC | Age: 57
End: 2022-01-13
Payer: COMMERCIAL

## 2022-01-13 DIAGNOSIS — Z00.8 ENCOUNTER FOR OTHER GENERAL EXAMINATION: ICD-10-CM

## 2022-01-13 PROCEDURE — 80061 LIPID PANEL: CPT | Mod: QW,S$GLB,, | Performed by: INTERNAL MEDICINE

## 2022-01-13 PROCEDURE — 99401 PREV MED CNSL INDIV APPRX 15: CPT | Mod: S$GLB,,, | Performed by: INTERNAL MEDICINE

## 2022-01-13 PROCEDURE — 99401 PR PREVENT COUNSEL,INDIV,15 MIN: ICD-10-PCS | Mod: S$GLB,,, | Performed by: INTERNAL MEDICINE

## 2022-01-13 PROCEDURE — 82947 PR  ASSAY QUANTITATIVE,BLOOD GLUCOSE: ICD-10-PCS | Mod: QW,S$GLB,, | Performed by: INTERNAL MEDICINE

## 2022-01-13 PROCEDURE — 82947 ASSAY GLUCOSE BLOOD QUANT: CPT | Mod: QW,S$GLB,, | Performed by: INTERNAL MEDICINE

## 2022-01-13 PROCEDURE — 80061 PR  LIPID PANEL: ICD-10-PCS | Mod: QW,S$GLB,, | Performed by: INTERNAL MEDICINE

## 2022-01-19 VITALS — HEIGHT: 69 IN | BODY MASS INDEX: 26.57 KG/M2

## 2022-01-19 LAB
HDLC SERPL-MCNC: 47 MG/DL
POC CHOLESTEROL, LDL (DOCK): 83 MG/DL
POC CHOLESTEROL, TOTAL: 155 MG/DL
POC GLUCOSE, FASTING: 107 MG/DL (ref 60–110)
TRIGL SERPL-MCNC: 127 MG/DL

## 2022-07-13 ENCOUNTER — PATIENT MESSAGE (OUTPATIENT)
Dept: INTERNAL MEDICINE | Facility: CLINIC | Age: 57
End: 2022-07-13
Payer: COMMERCIAL

## 2022-07-21 ENCOUNTER — OFFICE VISIT (OUTPATIENT)
Dept: INTERNAL MEDICINE | Facility: CLINIC | Age: 57
End: 2022-07-21
Payer: COMMERCIAL

## 2022-07-21 VITALS
OXYGEN SATURATION: 98 % | SYSTOLIC BLOOD PRESSURE: 136 MMHG | DIASTOLIC BLOOD PRESSURE: 96 MMHG | TEMPERATURE: 98 F | HEART RATE: 60 BPM | HEIGHT: 69 IN | WEIGHT: 184.75 LBS | BODY MASS INDEX: 27.36 KG/M2

## 2022-07-21 DIAGNOSIS — I10 ESSENTIAL HYPERTENSION: Primary | ICD-10-CM

## 2022-07-21 DIAGNOSIS — Z00.00 ANNUAL PHYSICAL EXAM: ICD-10-CM

## 2022-07-21 PROCEDURE — 1159F MED LIST DOCD IN RCRD: CPT | Mod: CPTII,S$GLB,, | Performed by: INTERNAL MEDICINE

## 2022-07-21 PROCEDURE — 99999 PR PBB SHADOW E&M-EST. PATIENT-LVL IV: ICD-10-PCS | Mod: PBBFAC,,, | Performed by: INTERNAL MEDICINE

## 2022-07-21 PROCEDURE — 3008F BODY MASS INDEX DOCD: CPT | Mod: CPTII,S$GLB,, | Performed by: INTERNAL MEDICINE

## 2022-07-21 PROCEDURE — 1159F PR MEDICATION LIST DOCUMENTED IN MEDICAL RECORD: ICD-10-PCS | Mod: CPTII,S$GLB,, | Performed by: INTERNAL MEDICINE

## 2022-07-21 PROCEDURE — 3080F DIAST BP >= 90 MM HG: CPT | Mod: CPTII,S$GLB,, | Performed by: INTERNAL MEDICINE

## 2022-07-21 PROCEDURE — 3075F SYST BP GE 130 - 139MM HG: CPT | Mod: CPTII,S$GLB,, | Performed by: INTERNAL MEDICINE

## 2022-07-21 PROCEDURE — 99214 PR OFFICE/OUTPT VISIT, EST, LEVL IV, 30-39 MIN: ICD-10-PCS | Mod: S$GLB,,, | Performed by: INTERNAL MEDICINE

## 2022-07-21 PROCEDURE — 99999 PR PBB SHADOW E&M-EST. PATIENT-LVL IV: CPT | Mod: PBBFAC,,, | Performed by: INTERNAL MEDICINE

## 2022-07-21 PROCEDURE — 3075F PR MOST RECENT SYSTOLIC BLOOD PRESS GE 130-139MM HG: ICD-10-PCS | Mod: CPTII,S$GLB,, | Performed by: INTERNAL MEDICINE

## 2022-07-21 PROCEDURE — 99214 OFFICE O/P EST MOD 30 MIN: CPT | Mod: S$GLB,,, | Performed by: INTERNAL MEDICINE

## 2022-07-21 PROCEDURE — 3008F PR BODY MASS INDEX (BMI) DOCUMENTED: ICD-10-PCS | Mod: CPTII,S$GLB,, | Performed by: INTERNAL MEDICINE

## 2022-07-21 PROCEDURE — 3080F PR MOST RECENT DIASTOLIC BLOOD PRESSURE >= 90 MM HG: ICD-10-PCS | Mod: CPTII,S$GLB,, | Performed by: INTERNAL MEDICINE

## 2022-07-21 RX ORDER — AMLODIPINE BESYLATE 5 MG/1
5 TABLET ORAL DAILY
Qty: 30 TABLET | Refills: 11 | Status: SHIPPED | OUTPATIENT
Start: 2022-07-21 | End: 2022-08-08 | Stop reason: SDUPTHER

## 2022-07-21 NOTE — PROGRESS NOTES
[facility] Note    Chief Complaint      Chief Complaint   Patient presents with    Hypertension     Running high       History of Present Illness      Juarez Keane is a 57 y.o. male with chronic conditions of chronic neck and left arm pain who presents today for: elevated BP.  Has had a few office readings that have been elveated.  High today.  Has been having left temporal/parietal headaches but does have chronic neck pain.    Flu shot declines.  Td 2020.  Shingles vaccine due age 60.  Pneumonia vaccine due age 65. COVID vaccine UTD.  Cscope 2017, Dr. Leger, no polyps, 7-10 yr interval.    Past Medical History:  Past Medical History:   Diagnosis Date    Degenerative disc disease     GERD (gastroesophageal reflux disease)     Optic neuritis, right        Past Surgical History:   has a past surgical history that includes Spine surgery; Bilateral knee arthroscopic; Hernia repair; and Vasectomy (7/1990).    Family History:  family history includes Arthritis in his mother; Cancer (age of onset: 68) in his father; Colon cancer in his maternal grandmother; Heart disease in his mother; Hypertension in his mother; Neuropathy in his mother; Throat cancer in his father.     Social History:  Social History     Tobacco Use    Smoking status: Never Smoker    Smokeless tobacco: Never Used   Substance Use Topics    Alcohol use: Yes     Alcohol/week: 3.0 standard drinks     Types: 3 Cans of beer per week     Comment: occasionally    Drug use: No       I personally reviewed all past medical, surgical, social and family history.    Review of Systems   Neurological: Positive for headaches.        Medications:  Outpatient Encounter Medications as of 7/21/2022   Medication Sig Dispense Refill    amLODIPine (NORVASC) 5 MG tablet Take 1 tablet (5 mg total) by mouth once daily. 30 tablet 11    diclofenac sodium 1 % Gel Apply 2 g topically 4 (four) times daily. 100 g 0    ipratropium (ATROVENT) 0.06 %  "nasal spray 2 sprays by Nasal route 4 (four) times daily. (Patient not taking: Reported on 1/23/2020) 15 mL 12    levocetirizine (XYZAL) 5 MG tablet Take 1 tablet (5 mg total) by mouth every evening. 30 tablet 1    mupirocin (BACTROBAN) 2 % ointment Apply to affected area 3 times daily x 7 days (Patient not taking: Reported on 1/23/2020) 22 g 0    naproxen (NAPROSYN) 500 MG tablet Take 1 tablet (500 mg total) by mouth 2 (two) times daily. (Patient not taking: Reported on 1/23/2020) 30 tablet 0     No facility-administered encounter medications on file as of 7/21/2022.       Allergies:  Review of patient's allergies indicates:   Allergen Reactions    Sulfa (sulfonamide antibiotics)        Health Maintenance:  Immunization History   Administered Date(s) Administered    COVID-19, vector-nr, rS-Ad26, PF ([employer]) 03/11/2021    Influenza - Quadrivalent - PF *Preferred* (6 months and older) 11/01/2011    Tdap 11/02/2017, 01/23/2020      Health Maintenance   Topic Date Due    Hepatitis C Screening  Never done    Lipid Panel  01/13/2027    TETANUS VACCINE  01/23/2030        Physical Exam      Vital Signs  Temp: 97.6 °F (36.4 °C)  Temp src: Oral  Pulse: 60  SpO2: 98 %  BP: (!) 136/96  BP Location: Right arm  Patient Position: Sitting  Pain Score: 0-No pain  Height and Weight  Height: 5' 9" (175.3 cm)  Weight: 83.8 kg (184 lb 11.9 oz)  BSA (Calculated - sq m): 2.02 sq meters  BMI (Calculated): 27.3  Weight in (lb) to have BMI = 25: 168.9]    Physical Exam  Vitals reviewed.   Constitutional:       Appearance: He is well-developed.   HENT:      Head: Normocephalic and atraumatic.      Right Ear: External ear normal.      Left Ear: External ear normal.   Cardiovascular:      Rate and Rhythm: Normal rate and regular rhythm.      Heart sounds: Normal heart sounds. No murmur heard.  Pulmonary:      Effort: Pulmonary effort is normal.      Breath sounds: Normal breath sounds. No wheezing or rales.   Abdominal:      " General: Bowel sounds are normal.      Palpations: Abdomen is soft.          Laboratory:  CBC:  Recent Labs   Lab 01/23/20  0818 11/04/21  0637   WBC 4.86 5.00   RBC 5.64 5.64   Hemoglobin 16.8 16.8   Hematocrit 51.3 50.6   Platelets 238 226   MCV 91 90   MCH 29.8 29.8   MCHC 32.7 33.2     CMP:  Recent Labs   Lab 01/23/20  0818 11/04/21  0637   Glucose 108 104   Calcium 9.4 9.1   Albumin 4.3 4.4   Total Protein 7.4 7.2   Sodium 140 143   Potassium 4.4 4.4   CO2 27 31 H   Chloride 104 104   BUN 14 19   Alkaline Phosphatase 99 86   ALT 18 24   AST 20 28   Total Bilirubin 0.6 0.9     URINALYSIS:       LIPIDS:  Recent Labs   Lab 01/23/20  0818 11/04/21  0637   TSH 1.883 2.540   HDL 51 47   Cholesterol 190 170   Triglycerides 71 73   LDL Cholesterol 124.8 108.4   HDL/Cholesterol Ratio 26.8 27.6   Non-HDL Cholesterol 139 123   Total Cholesterol/HDL Ratio 3.7 3.6     TSH:  Recent Labs   Lab 01/23/20  0818 11/04/21  0637   TSH 1.883 2.540     A1C:        Assessment/Plan     Juarez Keane is a 57 y.o.male with:    1. Essential hypertension  - amLODIPine (NORVASC) 5 MG tablet; Take 1 tablet (5 mg total) by mouth once daily.  Dispense: 30 tablet; Refill: 11       Chronic conditions status updated as per HPI.  Other than changes above, cont current medications and maintain follow up with specialists.  Follow up in about 2 weeks (around 8/4/2022) for Nurse visit BP check.    No future appointments.    Kris Ponce MD  [facility]                  Answers for HPI/ROS submitted by the patient on 7/18/2022  Chronicity: new  Progression since onset: waxing and waning  Condition status: resistant  Agents associated with hypertension: NSAIDs  CAD risks: no known risk factors  Compliance problems: no compliance problems  Past treatments: nothing

## 2022-07-27 ENCOUNTER — PATIENT MESSAGE (OUTPATIENT)
Dept: INTERNAL MEDICINE | Facility: CLINIC | Age: 57
End: 2022-07-27
Payer: COMMERCIAL

## 2022-08-05 ENCOUNTER — CLINICAL SUPPORT (OUTPATIENT)
Dept: INTERNAL MEDICINE | Facility: CLINIC | Age: 57
End: 2022-08-05
Payer: COMMERCIAL

## 2022-08-05 VITALS — DIASTOLIC BLOOD PRESSURE: 82 MMHG | SYSTOLIC BLOOD PRESSURE: 146 MMHG | HEART RATE: 75 BPM | OXYGEN SATURATION: 98 %

## 2022-08-05 DIAGNOSIS — I10 ESSENTIAL HYPERTENSION: ICD-10-CM

## 2022-08-05 PROCEDURE — 99999 PR PBB SHADOW E&M-EST. PATIENT-LVL II: ICD-10-PCS | Mod: PBBFAC,,,

## 2022-08-05 PROCEDURE — 99999 PR PBB SHADOW E&M-EST. PATIENT-LVL II: CPT | Mod: PBBFAC,,,

## 2022-08-05 NOTE — PROGRESS NOTES
Pt came in for 2 week BP check     Taking Amlodipine 5 mg daily     Vitals: RT arm 146/82  Pulse 75  O2  98%    Is going to increase amlodipine to 10 mg daily and will return in 2 weeks for BP Check

## 2022-08-05 NOTE — Clinical Note
Pt will be running out of his Amlodipine 5 mg after increasing to 10mg a day  Can you please send new RX to the pharmacy for 10mg daily

## 2022-08-08 ENCOUNTER — PATIENT MESSAGE (OUTPATIENT)
Dept: INTERNAL MEDICINE | Facility: CLINIC | Age: 57
End: 2022-08-08
Payer: COMMERCIAL

## 2022-08-08 RX ORDER — AMLODIPINE BESYLATE 10 MG/1
10 TABLET ORAL DAILY
Qty: 90 TABLET | Refills: 3 | Status: SHIPPED | OUTPATIENT
Start: 2022-08-08 | End: 2023-02-08 | Stop reason: SDUPTHER

## 2022-08-26 ENCOUNTER — CLINICAL SUPPORT (OUTPATIENT)
Dept: INTERNAL MEDICINE | Facility: CLINIC | Age: 57
End: 2022-08-26
Payer: COMMERCIAL

## 2022-08-26 VITALS — DIASTOLIC BLOOD PRESSURE: 88 MMHG | SYSTOLIC BLOOD PRESSURE: 132 MMHG

## 2022-08-26 PROCEDURE — 99999 PR PBB SHADOW E&M-EST. PATIENT-LVL I: CPT | Mod: PBBFAC,,,

## 2022-08-26 PROCEDURE — 99999 PR PBB SHADOW E&M-EST. PATIENT-LVL I: ICD-10-PCS | Mod: PBBFAC,,,

## 2022-08-26 NOTE — PROGRESS NOTES
Pt here for 2 week bp check. Reading today is 132/88 pulse 78 on amlodipine 10 mg. Pt also have been having left shoulder pain for the past month. Wants to know what else OTC he can take beside aleve or if he should try getting back on naproxen?

## 2022-08-30 NOTE — PROGRESS NOTES
BP at goal with diastolic pressure still borderline high.  Cont current meds and work on low sodium diet.  As for shoulder pain, if this is a newer presentation of shoulder pain, can try getting back on aleve for a week or two to see if it resolves.  If not, let me know and I will have him see sports medicine to evaluate for steroid injection.

## 2022-08-31 ENCOUNTER — PATIENT MESSAGE (OUTPATIENT)
Dept: INTERNAL MEDICINE | Facility: CLINIC | Age: 57
End: 2022-08-31
Payer: COMMERCIAL

## 2022-08-31 NOTE — PROGRESS NOTES
Tried to contact patient phone rings, patient picks up, phone goes dead. This has happen 3 times already

## 2022-10-12 ENCOUNTER — CLINICAL SUPPORT (OUTPATIENT)
Dept: OTHER | Facility: CLINIC | Age: 57
End: 2022-10-12
Payer: COMMERCIAL

## 2022-10-12 DIAGNOSIS — Z00.8 ENCOUNTER FOR OTHER GENERAL EXAMINATION: ICD-10-CM

## 2022-10-12 PROCEDURE — 80061 PR  LIPID PANEL: ICD-10-PCS | Mod: QW,S$GLB,, | Performed by: INTERNAL MEDICINE

## 2022-10-12 PROCEDURE — 99401 PREV MED CNSL INDIV APPRX 15: CPT | Mod: S$GLB,,, | Performed by: INTERNAL MEDICINE

## 2022-10-12 PROCEDURE — 99401 PR PREVENT COUNSEL,INDIV,15 MIN: ICD-10-PCS | Mod: S$GLB,,, | Performed by: INTERNAL MEDICINE

## 2022-10-12 PROCEDURE — 82947 PR  ASSAY QUANTITATIVE,BLOOD GLUCOSE: ICD-10-PCS | Mod: QW,S$GLB,, | Performed by: INTERNAL MEDICINE

## 2022-10-12 PROCEDURE — 80061 LIPID PANEL: CPT | Mod: QW,S$GLB,, | Performed by: INTERNAL MEDICINE

## 2022-10-12 PROCEDURE — 82947 ASSAY GLUCOSE BLOOD QUANT: CPT | Mod: QW,S$GLB,, | Performed by: INTERNAL MEDICINE

## 2022-10-24 LAB
GLUCOSE SERPL-MCNC: 129 MG/DL (ref 60–140)
HDLC SERPL-MCNC: 43 MG/DL
POC CHOLESTEROL, LDL (DOCK): 107 MG/DL
POC CHOLESTEROL, TOTAL: 168 MG/DL
TRIGL SERPL-MCNC: 99 MG/DL

## 2022-10-29 VITALS
HEIGHT: 78 IN | DIASTOLIC BLOOD PRESSURE: 99 MMHG | WEIGHT: 181 LBS | BODY MASS INDEX: 20.94 KG/M2 | SYSTOLIC BLOOD PRESSURE: 154 MMHG

## 2022-11-11 ENCOUNTER — LAB VISIT (OUTPATIENT)
Dept: LAB | Facility: HOSPITAL | Age: 57
End: 2022-11-11
Attending: INTERNAL MEDICINE
Payer: COMMERCIAL

## 2022-11-11 ENCOUNTER — OFFICE VISIT (OUTPATIENT)
Dept: INTERNAL MEDICINE | Facility: CLINIC | Age: 57
End: 2022-11-11
Payer: COMMERCIAL

## 2022-11-11 VITALS
HEIGHT: 69 IN | DIASTOLIC BLOOD PRESSURE: 80 MMHG | HEART RATE: 72 BPM | WEIGHT: 182.75 LBS | SYSTOLIC BLOOD PRESSURE: 120 MMHG | OXYGEN SATURATION: 95 % | BODY MASS INDEX: 27.07 KG/M2

## 2022-11-11 DIAGNOSIS — R73.01 IMPAIRED FASTING GLUCOSE: ICD-10-CM

## 2022-11-11 DIAGNOSIS — Z00.00 ANNUAL PHYSICAL EXAM: Primary | ICD-10-CM

## 2022-11-11 DIAGNOSIS — I10 ESSENTIAL HYPERTENSION: ICD-10-CM

## 2022-11-11 LAB
ESTIMATED AVG GLUCOSE: 111 MG/DL (ref 68–131)
HBA1C MFR BLD: 5.5 % (ref 4–5.6)

## 2022-11-11 PROCEDURE — 3008F PR BODY MASS INDEX (BMI) DOCUMENTED: ICD-10-PCS | Mod: CPTII,S$GLB,, | Performed by: INTERNAL MEDICINE

## 2022-11-11 PROCEDURE — 99396 PR PREVENTIVE VISIT,EST,40-64: ICD-10-PCS | Mod: S$GLB,,, | Performed by: INTERNAL MEDICINE

## 2022-11-11 PROCEDURE — 99999 PR PBB SHADOW E&M-EST. PATIENT-LVL III: CPT | Mod: PBBFAC,,, | Performed by: INTERNAL MEDICINE

## 2022-11-11 PROCEDURE — 3079F DIAST BP 80-89 MM HG: CPT | Mod: CPTII,S$GLB,, | Performed by: INTERNAL MEDICINE

## 2022-11-11 PROCEDURE — 3074F PR MOST RECENT SYSTOLIC BLOOD PRESSURE < 130 MM HG: ICD-10-PCS | Mod: CPTII,S$GLB,, | Performed by: INTERNAL MEDICINE

## 2022-11-11 PROCEDURE — 99999 PR PBB SHADOW E&M-EST. PATIENT-LVL III: ICD-10-PCS | Mod: PBBFAC,,, | Performed by: INTERNAL MEDICINE

## 2022-11-11 PROCEDURE — 3074F SYST BP LT 130 MM HG: CPT | Mod: CPTII,S$GLB,, | Performed by: INTERNAL MEDICINE

## 2022-11-11 PROCEDURE — 99396 PREV VISIT EST AGE 40-64: CPT | Mod: S$GLB,,, | Performed by: INTERNAL MEDICINE

## 2022-11-11 PROCEDURE — 36415 COLL VENOUS BLD VENIPUNCTURE: CPT | Performed by: INTERNAL MEDICINE

## 2022-11-11 PROCEDURE — 83036 HEMOGLOBIN GLYCOSYLATED A1C: CPT | Performed by: INTERNAL MEDICINE

## 2022-11-11 PROCEDURE — 3008F BODY MASS INDEX DOCD: CPT | Mod: CPTII,S$GLB,, | Performed by: INTERNAL MEDICINE

## 2022-11-11 PROCEDURE — 1159F PR MEDICATION LIST DOCUMENTED IN MEDICAL RECORD: ICD-10-PCS | Mod: CPTII,S$GLB,, | Performed by: INTERNAL MEDICINE

## 2022-11-11 PROCEDURE — 1159F MED LIST DOCD IN RCRD: CPT | Mod: CPTII,S$GLB,, | Performed by: INTERNAL MEDICINE

## 2022-11-11 PROCEDURE — 3079F PR MOST RECENT DIASTOLIC BLOOD PRESSURE 80-89 MM HG: ICD-10-PCS | Mod: CPTII,S$GLB,, | Performed by: INTERNAL MEDICINE

## 2022-11-11 NOTE — PROGRESS NOTES
[facility] Note    Chief Complaint      Chief Complaint   Patient presents with    Annual Exam       History of Present Illness      Juarez Keane is a 57 y.o. male with chronic conditions of chronic neck and left arm pain who presents today for: annual preventative visit.  HTN: BP at goal on amlodipine   Chronic neck pain, chronic left arm pain: S/p anterior fusion with Dr. Jacobo.  Still with neuropathic pain in left shoulder and arm.  Has not seen anyone for this since surgery.    Has had cardiac workup recently with Dr. Cote for family history.  Had normal stress test and ct calcium score.  Diet: Prepares own food mostly.  Limiting fatty foods and carbs.  Cut back on soft drinks.   Exercise: stays active.  Denies drinking and driving, drinking more than 4 drinks on occasion, drug use.     Flu shot declines.  Td 2020.  Shingles vaccine due age 60.  Pneumonia vaccine due age 65. COVID vaccine UTD.  Cscope 2016, Dr. Leger, no polyps, 10 yr interval.    Past Medical History:  Past Medical History:   Diagnosis Date    Degenerative disc disease     GERD (gastroesophageal reflux disease)     Optic neuritis, right        Past Surgical History:   has a past surgical history that includes Spine surgery; Bilateral knee arthroscopic; Hernia repair; and Vasectomy (7/1990).    Family History:  family history includes Arthritis in his mother; Cancer in his father; Colon cancer in his maternal grandmother; Heart disease in his mother; Hypertension in his mother; Neuropathy in his mother; Throat cancer in his father.     Social History:  Social History     Tobacco Use    Smoking status: Never    Smokeless tobacco: Never   Substance Use Topics    Alcohol use: Yes     Alcohol/week: 3.0 standard drinks     Types: 3 Cans of beer per week     Comment: occasionally    Drug use: No       I personally reviewed all past medical, surgical, social and family history.    Review of Systems  "  Constitutional:  Negative for chills, fever and malaise/fatigue.   Respiratory:  Negative for shortness of breath.    Cardiovascular:  Negative for chest pain.   Gastrointestinal:  Negative for constipation, diarrhea, nausea and vomiting.   Skin:  Negative for rash.   Neurological:  Negative for weakness.   All other systems reviewed and are negative.     Medications:  Outpatient Encounter Medications as of 11/11/2022   Medication Sig Dispense Refill    amLODIPine (NORVASC) 10 MG tablet Take 1 tablet (10 mg total) by mouth once daily. 90 tablet 3    diclofenac sodium 1 % Gel Apply 2 g topically 4 (four) times daily. 100 g 0    ipratropium (ATROVENT) 0.06 % nasal spray 2 sprays by Nasal route 4 (four) times daily. (Patient not taking: Reported on 1/23/2020) 15 mL 12    levocetirizine (XYZAL) 5 MG tablet Take 1 tablet (5 mg total) by mouth every evening. 30 tablet 1    mupirocin (BACTROBAN) 2 % ointment Apply to affected area 3 times daily x 7 days (Patient not taking: Reported on 1/23/2020) 22 g 0    naproxen (NAPROSYN) 500 MG tablet Take 1 tablet (500 mg total) by mouth 2 (two) times daily. (Patient not taking: Reported on 1/23/2020) 30 tablet 0     No facility-administered encounter medications on file as of 11/11/2022.       Allergies:  Review of patient's allergies indicates:   Allergen Reactions    Sulfa (sulfonamide antibiotics)        Health Maintenance:  Immunization History   Administered Date(s) Administered    COVID-19, vector-nr, rS-Ad26, PF ([employer]) 03/11/2021    Influenza - Quadrivalent - PF *Preferred* (6 months and older) 11/01/2011, 10/25/2017    Tdap 11/02/2017, 01/23/2020      Health Maintenance   Topic Date Due    Hepatitis C Screening  Never done    Lipid Panel  11/10/2027    TETANUS VACCINE  01/23/2030        Physical Exam      Vital Signs  Pulse: 72  SpO2: 95 %  BP: 120/80  BP Location: Left arm  Patient Position: Sitting  Pain Score: 0-No pain  Height and Weight  Height: 5' 9" (175.3 " cm)  Weight: 82.9 kg (182 lb 12.2 oz)  BSA (Calculated - sq m): 2.01 sq meters  BMI (Calculated): 27  Weight in (lb) to have BMI = 25: 168.9]    Physical Exam  Vitals reviewed.   Constitutional:       Appearance: He is well-developed.   HENT:      Head: Normocephalic and atraumatic.      Right Ear: External ear normal.      Left Ear: External ear normal.   Cardiovascular:      Rate and Rhythm: Normal rate and regular rhythm.      Heart sounds: Normal heart sounds. No murmur heard.  Pulmonary:      Effort: Pulmonary effort is normal.      Breath sounds: Normal breath sounds. No wheezing or rales.   Abdominal:      General: Bowel sounds are normal.      Palpations: Abdomen is soft.        Laboratory:  CBC:  Recent Labs   Lab 01/23/20  0818 11/04/21  0637 11/10/22  0612   WBC 4.86 5.00 5.59   RBC 5.64 5.64 5.47   Hemoglobin 16.8 16.8 16.4   Hematocrit 51.3 50.6 48.2   Platelets 238 226 221   MCV 91 90 88   MCH 29.8 29.8 30.0   MCHC 32.7 33.2 34.0     CMP:  Recent Labs   Lab 01/23/20 0818 11/04/21 0637 11/10/22  0612   Glucose 108 104 120 H   Calcium 9.4 9.1 8.9   Albumin 4.3 4.4 4.1   Total Protein 7.4 7.2 6.8   Sodium 140 143 141   Potassium 4.4 4.4 4.3   CO2 27 31 H 32 H   Chloride 104 104 102   BUN 14 19 16   Alkaline Phosphatase 99 86 97   ALT 18 24 25   AST 20 28 26   Total Bilirubin 0.6 0.9 0.7     URINALYSIS:       LIPIDS:  Recent Labs   Lab 01/23/20 0818 11/04/21 0637 11/10/22  0612   TSH 1.883 2.540 2.740   HDL 51 47 54   Cholesterol 190 170 172   Triglycerides 71 73 69   LDL Cholesterol 124.8 108.4 104.2   HDL/Cholesterol Ratio 26.8 27.6 31.4   Non-HDL Cholesterol 139 123 118   Total Cholesterol/HDL Ratio 3.7 3.6 3.2     TSH:  Recent Labs   Lab 01/23/20 0818 11/04/21 0637 11/10/22  0612   TSH 1.883 2.540 2.740     A1C:        Assessment/Plan     Juarez Keane is a 57 y.o.male with:    1. Annual physical exam  Discussed diet and exercise, vaccines and cancer screening, risk factors.  Screening  labs reviewed  2. Essential hypertension  Continue current meds.    3. Impaired fasting glucose  - Hemoglobin A1C; Future     Chronic conditions status updated as per HPI.  Other than changes above, cont current medications and maintain follow up with specialists.  No follow-ups on file.    No future appointments.    Kris Ponce MD  [facility]

## 2023-02-06 ENCOUNTER — PATIENT MESSAGE (OUTPATIENT)
Dept: PRIMARY CARE CLINIC | Facility: CLINIC | Age: 58
End: 2023-02-06
Payer: COMMERCIAL

## 2023-02-06 DIAGNOSIS — I10 ESSENTIAL HYPERTENSION: ICD-10-CM

## 2023-02-07 NOTE — TELEPHONE ENCOUNTER
"C/o "swelling in my legs, fatigue, itching, and feel flustered quite often" from Amlodipine 10 MG  "

## 2023-02-08 RX ORDER — AMLODIPINE BESYLATE 5 MG/1
5 TABLET ORAL DAILY
Qty: 90 TABLET | Refills: 3 | Status: SHIPPED | OUTPATIENT
Start: 2023-02-08 | End: 2023-11-13

## 2023-02-08 RX ORDER — LOSARTAN POTASSIUM 50 MG/1
50 TABLET ORAL DAILY
Qty: 90 TABLET | Refills: 3 | Status: SHIPPED | OUTPATIENT
Start: 2023-02-08 | End: 2023-11-13

## 2023-02-08 NOTE — TELEPHONE ENCOUNTER
These are all possible side effects to higher doses of amlodipine.  Let's try decreasing amlodipine back to 5 mg daily and adding losartan 50 mg daily.  Both prescriptions sent.

## 2023-11-06 ENCOUNTER — PATIENT MESSAGE (OUTPATIENT)
Dept: FAMILY MEDICINE | Facility: CLINIC | Age: 58
End: 2023-11-06
Payer: COMMERCIAL

## 2023-11-06 DIAGNOSIS — Z00.00 WELLNESS EXAMINATION: ICD-10-CM

## 2023-11-06 DIAGNOSIS — Z13.6 ENCOUNTER FOR SCREENING FOR CARDIOVASCULAR DISORDERS: ICD-10-CM

## 2023-11-06 DIAGNOSIS — I10 ESSENTIAL HYPERTENSION: Primary | ICD-10-CM

## 2023-11-06 NOTE — TELEPHONE ENCOUNTER
Patient has an apt on 11/13/2023 to see you. Patient would like to get labs done before his apt. Please place lab orders in system for patient. Please advise.

## 2023-11-07 ENCOUNTER — PATIENT MESSAGE (OUTPATIENT)
Dept: FAMILY MEDICINE | Facility: CLINIC | Age: 58
End: 2023-11-07
Payer: COMMERCIAL

## 2023-11-13 ENCOUNTER — OFFICE VISIT (OUTPATIENT)
Dept: FAMILY MEDICINE | Facility: CLINIC | Age: 58
End: 2023-11-13
Payer: COMMERCIAL

## 2023-11-13 VITALS
DIASTOLIC BLOOD PRESSURE: 84 MMHG | OXYGEN SATURATION: 98 % | BODY MASS INDEX: 27.53 KG/M2 | HEART RATE: 80 BPM | HEIGHT: 69 IN | WEIGHT: 185.88 LBS | TEMPERATURE: 98 F | SYSTOLIC BLOOD PRESSURE: 136 MMHG

## 2023-11-13 DIAGNOSIS — Z76.89 ENCOUNTER TO ESTABLISH CARE WITH NEW DOCTOR: Primary | ICD-10-CM

## 2023-11-13 DIAGNOSIS — I10 ESSENTIAL HYPERTENSION: ICD-10-CM

## 2023-11-13 DIAGNOSIS — Z13.6 ENCOUNTER FOR SCREENING FOR CARDIOVASCULAR DISORDERS: ICD-10-CM

## 2023-11-13 DIAGNOSIS — Z00.00 WELLNESS EXAMINATION: ICD-10-CM

## 2023-11-13 DIAGNOSIS — Z11.59 ENCOUNTER FOR HEPATITIS C SCREENING TEST FOR LOW RISK PATIENT: ICD-10-CM

## 2023-11-13 PROBLEM — G62.9 NEUROPATHY: Status: RESOLVED | Noted: 2017-11-02 | Resolved: 2023-11-13

## 2023-11-13 PROBLEM — M25.50 ARTHRALGIA: Status: RESOLVED | Noted: 2017-11-02 | Resolved: 2023-11-13

## 2023-11-13 PROBLEM — Z98.890 PERSONAL HISTORY OF SPINE SURGERY: Status: RESOLVED | Noted: 2017-11-02 | Resolved: 2023-11-13

## 2023-11-13 PROBLEM — R25.2 MUSCLE CRAMPING: Status: RESOLVED | Noted: 2017-11-02 | Resolved: 2023-11-13

## 2023-11-13 PROCEDURE — 3044F HG A1C LEVEL LT 7.0%: CPT | Mod: CPTII,S$GLB,, | Performed by: STUDENT IN AN ORGANIZED HEALTH CARE EDUCATION/TRAINING PROGRAM

## 2023-11-13 PROCEDURE — 3008F BODY MASS INDEX DOCD: CPT | Mod: CPTII,S$GLB,, | Performed by: STUDENT IN AN ORGANIZED HEALTH CARE EDUCATION/TRAINING PROGRAM

## 2023-11-13 PROCEDURE — 4010F ACE/ARB THERAPY RXD/TAKEN: CPT | Mod: CPTII,S$GLB,, | Performed by: STUDENT IN AN ORGANIZED HEALTH CARE EDUCATION/TRAINING PROGRAM

## 2023-11-13 PROCEDURE — 4010F PR ACE/ARB THEARPY RXD/TAKEN: ICD-10-PCS | Mod: CPTII,S$GLB,, | Performed by: STUDENT IN AN ORGANIZED HEALTH CARE EDUCATION/TRAINING PROGRAM

## 2023-11-13 PROCEDURE — 3008F PR BODY MASS INDEX (BMI) DOCUMENTED: ICD-10-PCS | Mod: CPTII,S$GLB,, | Performed by: STUDENT IN AN ORGANIZED HEALTH CARE EDUCATION/TRAINING PROGRAM

## 2023-11-13 PROCEDURE — 99999 PR PBB SHADOW E&M-EST. PATIENT-LVL IV: ICD-10-PCS | Mod: PBBFAC,,, | Performed by: STUDENT IN AN ORGANIZED HEALTH CARE EDUCATION/TRAINING PROGRAM

## 2023-11-13 PROCEDURE — 99396 PREV VISIT EST AGE 40-64: CPT | Mod: S$GLB,,, | Performed by: STUDENT IN AN ORGANIZED HEALTH CARE EDUCATION/TRAINING PROGRAM

## 2023-11-13 PROCEDURE — 3079F DIAST BP 80-89 MM HG: CPT | Mod: CPTII,S$GLB,, | Performed by: STUDENT IN AN ORGANIZED HEALTH CARE EDUCATION/TRAINING PROGRAM

## 2023-11-13 PROCEDURE — 3079F PR MOST RECENT DIASTOLIC BLOOD PRESSURE 80-89 MM HG: ICD-10-PCS | Mod: CPTII,S$GLB,, | Performed by: STUDENT IN AN ORGANIZED HEALTH CARE EDUCATION/TRAINING PROGRAM

## 2023-11-13 PROCEDURE — 3075F SYST BP GE 130 - 139MM HG: CPT | Mod: CPTII,S$GLB,, | Performed by: STUDENT IN AN ORGANIZED HEALTH CARE EDUCATION/TRAINING PROGRAM

## 2023-11-13 PROCEDURE — 1159F PR MEDICATION LIST DOCUMENTED IN MEDICAL RECORD: ICD-10-PCS | Mod: CPTII,S$GLB,, | Performed by: STUDENT IN AN ORGANIZED HEALTH CARE EDUCATION/TRAINING PROGRAM

## 2023-11-13 PROCEDURE — 99999 PR PBB SHADOW E&M-EST. PATIENT-LVL IV: CPT | Mod: PBBFAC,,, | Performed by: STUDENT IN AN ORGANIZED HEALTH CARE EDUCATION/TRAINING PROGRAM

## 2023-11-13 PROCEDURE — 3044F PR MOST RECENT HEMOGLOBIN A1C LEVEL <7.0%: ICD-10-PCS | Mod: CPTII,S$GLB,, | Performed by: STUDENT IN AN ORGANIZED HEALTH CARE EDUCATION/TRAINING PROGRAM

## 2023-11-13 PROCEDURE — 99396 PR PREVENTIVE VISIT,EST,40-64: ICD-10-PCS | Mod: S$GLB,,, | Performed by: STUDENT IN AN ORGANIZED HEALTH CARE EDUCATION/TRAINING PROGRAM

## 2023-11-13 PROCEDURE — 3075F PR MOST RECENT SYSTOLIC BLOOD PRESS GE 130-139MM HG: ICD-10-PCS | Mod: CPTII,S$GLB,, | Performed by: STUDENT IN AN ORGANIZED HEALTH CARE EDUCATION/TRAINING PROGRAM

## 2023-11-13 PROCEDURE — 1159F MED LIST DOCD IN RCRD: CPT | Mod: CPTII,S$GLB,, | Performed by: STUDENT IN AN ORGANIZED HEALTH CARE EDUCATION/TRAINING PROGRAM

## 2023-11-13 RX ORDER — AZELASTINE HYDROCHLORIDE, FLUTICASONE PROPIONATE 137; 50 UG/1; UG/1
1 SPRAY, METERED NASAL 2 TIMES DAILY
Qty: 23 G | Refills: 2 | Status: SHIPPED | OUTPATIENT
Start: 2023-11-13

## 2023-11-13 RX ORDER — OLMESARTAN MEDOXOMIL 20 MG/1
20 TABLET ORAL DAILY
Qty: 30 TABLET | Refills: 0 | Status: SHIPPED | OUTPATIENT
Start: 2023-11-13 | End: 2023-12-01 | Stop reason: SDUPTHER

## 2023-11-13 RX ORDER — CICLOPIROX 80 MG/ML
SOLUTION TOPICAL DAILY
COMMUNITY

## 2023-11-13 NOTE — PROGRESS NOTES
Subjective:       Patient ID: Juarez Keane is a 58 y.o. male.    Chief Complaint: Establish Care (Est care/ annual visit/ ) and Medication Problem (Would like to discuss medications// joint pain and other symptoms)      Active Problem List with Overview Notes    Diagnosis Date Noted    Essential hypertension 07/21/2022     Amlodipine 5 and losartan 50  Notes + arthralgia, [place]; LE edema with amlodipine, previously on 10 but improved LE edema with 5mg and adding losartan  Pt would like to try to get off this and try alternative instead   + fatigue and arthralgia still feels since starting amlodipine   BP is well controlled but wants alternative   Will try Benicar 20; monitor BP at home and f/u in 2 weeks       S/P cervical spinal fusion 11/02/2017        Review of Systems   All other systems reviewed and are negative.       A1C:  Recent Labs   Lab 11/11/22  0839 11/09/23 0614   Hemoglobin A1C 5.5 5.4     CBC:  Recent Labs   Lab 11/04/21  0637 11/10/22  0612 11/09/23  0614   WBC 5.00 5.59 4.97   RBC 5.64 5.47 5.26   Hemoglobin 16.8 16.4 16.1   Hematocrit 50.6 48.2 46.9   Platelets 226 221 236   MCV 90 88 89   MCH 29.8 30.0 30.6   MCHC 33.2 34.0 34.3     CMP:  Recent Labs   Lab 11/04/21 0637 11/10/22  0612 11/09/23  0614   Glucose 104 120 H 108   Calcium 9.1 8.9 9.1   Albumin 4.4 4.1 3.9   Total Protein 7.2 6.8 6.8   Sodium 143 141 140   Potassium 4.4 4.3 4.0   CO2 31 H 32 H 28   Chloride 104 102 101   BUN 19 16 10   Creatinine 0.87 0.81 0.86   Alkaline Phosphatase 86 97 95   ALT 24 25 31   AST 28 26 26   Total Bilirubin 0.9 0.7 0.6     LIPIDS:  Recent Labs   Lab 11/04/21  0637 11/10/22  0612 11/09/23  0614   TSH 2.540 2.740 2.280   HDL 47 54 46   Cholesterol 170 172 173   Triglycerides 73 69 106   LDL Cholesterol 108.4 104.2 105.8   HDL/Cholesterol Ratio 27.6 31.4 26.6   Non-HDL Cholesterol 123 118 127   Total Cholesterol/HDL Ratio 3.6 3.2 3.8     TSH:  Recent Labs   Lab 11/04/21  0665  11/10/22  0612 11/09/23  0614   TSH 2.540 2.740 2.280        Objective:      Vitals:    11/13/23 1400   BP: 136/84   Pulse: 80   Temp: 98.4 °F (36.9 °C)      Physical Exam  Vitals reviewed.   Constitutional:       Appearance: Normal appearance. He is normal weight.   HENT:      Head: Normocephalic and atraumatic.   Eyes:      Conjunctiva/sclera: Conjunctivae normal.   Cardiovascular:      Rate and Rhythm: Normal rate and regular rhythm.      Heart sounds: Normal heart sounds.   Pulmonary:      Effort: Pulmonary effort is normal.      Breath sounds: Normal breath sounds.   Abdominal:      Palpations: Abdomen is soft.      Tenderness: There is no abdominal tenderness.   Musculoskeletal:         General: Normal range of motion.      Cervical back: Normal range of motion.      Right lower leg: No edema.      Left lower leg: No edema.   Neurological:      General: No focal deficit present.      Mental Status: He is alert and oriented to person, place, and time.   Psychiatric:         Mood and Affect: Mood normal.         Behavior: Behavior normal.          Assessment:       1. Encounter to establish care with new doctor    2. Wellness examination    3. Essential hypertension    4. Encounter for screening for cardiovascular disorders    5. Encounter for hepatitis C screening test for low risk patient        Plan:   1. Encounter to establish care with new doctor    2. Wellness examination    3. Essential hypertension    4. Encounter for screening for cardiovascular disorders    5. Encounter for hepatitis C screening test for low risk patient  - Hepatitis C Antibody; Future     Establish care and Well male  Labs reviewed in detail  HM discussed  UTD; declined vaccines today   Continue healthy lifestyle efforts  Continue current meds as prescribed otherwise; refills per request  Keep routine specialist f/u   RTC in 2 weeks for BP check with NP and  1 year with labs prior with me and/or KATIEN          Sandrine Olivo    [facility]   11/13/23

## 2023-11-15 ENCOUNTER — PATIENT MESSAGE (OUTPATIENT)
Dept: FAMILY MEDICINE | Facility: CLINIC | Age: 58
End: 2023-11-15
Payer: COMMERCIAL

## 2023-11-20 NOTE — TELEPHONE ENCOUNTER
Patient is wanting an alternative nasal spray called in . Please advise message and previous message on patient as well.

## 2023-12-01 ENCOUNTER — OFFICE VISIT (OUTPATIENT)
Dept: FAMILY MEDICINE | Facility: CLINIC | Age: 58
End: 2023-12-01
Payer: COMMERCIAL

## 2023-12-01 VITALS
SYSTOLIC BLOOD PRESSURE: 130 MMHG | BODY MASS INDEX: 27.33 KG/M2 | DIASTOLIC BLOOD PRESSURE: 84 MMHG | OXYGEN SATURATION: 98 % | HEIGHT: 69 IN | TEMPERATURE: 99 F | WEIGHT: 184.5 LBS | HEART RATE: 77 BPM

## 2023-12-01 DIAGNOSIS — I10 ESSENTIAL HYPERTENSION: Primary | ICD-10-CM

## 2023-12-01 PROCEDURE — 4010F ACE/ARB THERAPY RXD/TAKEN: CPT | Mod: CPTII,S$GLB,, | Performed by: PEDIATRICS

## 2023-12-01 PROCEDURE — 3075F SYST BP GE 130 - 139MM HG: CPT | Mod: CPTII,S$GLB,, | Performed by: PEDIATRICS

## 2023-12-01 PROCEDURE — 3079F DIAST BP 80-89 MM HG: CPT | Mod: CPTII,S$GLB,, | Performed by: PEDIATRICS

## 2023-12-01 PROCEDURE — 3008F PR BODY MASS INDEX (BMI) DOCUMENTED: ICD-10-PCS | Mod: CPTII,S$GLB,, | Performed by: PEDIATRICS

## 2023-12-01 PROCEDURE — 4010F PR ACE/ARB THEARPY RXD/TAKEN: ICD-10-PCS | Mod: CPTII,S$GLB,, | Performed by: PEDIATRICS

## 2023-12-01 PROCEDURE — 1160F RVW MEDS BY RX/DR IN RCRD: CPT | Mod: CPTII,S$GLB,, | Performed by: PEDIATRICS

## 2023-12-01 PROCEDURE — 1159F MED LIST DOCD IN RCRD: CPT | Mod: CPTII,S$GLB,, | Performed by: PEDIATRICS

## 2023-12-01 PROCEDURE — 3075F PR MOST RECENT SYSTOLIC BLOOD PRESS GE 130-139MM HG: ICD-10-PCS | Mod: CPTII,S$GLB,, | Performed by: PEDIATRICS

## 2023-12-01 PROCEDURE — 99999 PR PBB SHADOW E&M-EST. PATIENT-LVL III: ICD-10-PCS | Mod: PBBFAC,,, | Performed by: PEDIATRICS

## 2023-12-01 PROCEDURE — 3079F PR MOST RECENT DIASTOLIC BLOOD PRESSURE 80-89 MM HG: ICD-10-PCS | Mod: CPTII,S$GLB,, | Performed by: PEDIATRICS

## 2023-12-01 PROCEDURE — 1160F PR REVIEW ALL MEDS BY PRESCRIBER/CLIN PHARMACIST DOCUMENTED: ICD-10-PCS | Mod: CPTII,S$GLB,, | Performed by: PEDIATRICS

## 2023-12-01 PROCEDURE — 3008F BODY MASS INDEX DOCD: CPT | Mod: CPTII,S$GLB,, | Performed by: PEDIATRICS

## 2023-12-01 PROCEDURE — 3044F HG A1C LEVEL LT 7.0%: CPT | Mod: CPTII,S$GLB,, | Performed by: PEDIATRICS

## 2023-12-01 PROCEDURE — 1159F PR MEDICATION LIST DOCUMENTED IN MEDICAL RECORD: ICD-10-PCS | Mod: CPTII,S$GLB,, | Performed by: PEDIATRICS

## 2023-12-01 PROCEDURE — 99214 PR OFFICE/OUTPT VISIT, EST, LEVL IV, 30-39 MIN: ICD-10-PCS | Mod: S$GLB,,, | Performed by: PEDIATRICS

## 2023-12-01 PROCEDURE — 3044F PR MOST RECENT HEMOGLOBIN A1C LEVEL <7.0%: ICD-10-PCS | Mod: CPTII,S$GLB,, | Performed by: PEDIATRICS

## 2023-12-01 PROCEDURE — 99214 OFFICE O/P EST MOD 30 MIN: CPT | Mod: S$GLB,,, | Performed by: PEDIATRICS

## 2023-12-01 PROCEDURE — 99999 PR PBB SHADOW E&M-EST. PATIENT-LVL III: CPT | Mod: PBBFAC,,, | Performed by: PEDIATRICS

## 2023-12-01 RX ORDER — OLMESARTAN MEDOXOMIL 20 MG/1
20 TABLET ORAL DAILY
Qty: 90 TABLET | Refills: 3 | Status: SHIPPED | OUTPATIENT
Start: 2023-12-01

## 2023-12-01 NOTE — PROGRESS NOTES
Subjective:      Patient ID: Juarez Keane is a 58 y.o. male.    Chief Complaint: Follow-up (2 week follow up BP )    Patient here today for blood pressure check. Denies sob, chest pain, dizziness, headache, blurry vision. Reports taking bp at home, ranges from 113-127/77-85. No complaints, taking med every morning.      Follow-up  Pertinent negatives include no arthralgias, chest pain, chills, congestion, coughing, fatigue, fever, headaches, nausea, rash, sore throat or vomiting.     Review of Systems   Constitutional:  Negative for chills, fatigue and fever.   HENT:  Negative for congestion, ear pain, postnasal drip, rhinorrhea, sinus pressure, sinus pain, sneezing and sore throat.    Respiratory:  Negative for cough, chest tightness, shortness of breath and wheezing.    Cardiovascular:  Negative for chest pain and palpitations.   Gastrointestinal:  Negative for diarrhea, nausea and vomiting.   Genitourinary:  Negative for difficulty urinating.   Musculoskeletal:  Negative for arthralgias.   Skin:  Negative for rash.   Neurological:  Negative for dizziness and headaches.   Psychiatric/Behavioral:  Negative for confusion.         Current Outpatient Medications on File Prior to Visit   Medication Sig    azelastine-fluticasone (DYMISTA) 137-50 mcg/spray Spry nassal spray 1 spray by Each Nostril route 2 (two) times daily.    ciclopirox (PENLAC) 8 % Soln Apply topically Daily.    levocetirizine (XYZAL) 5 MG tablet Take 1 tablet (5 mg total) by mouth every evening.    [DISCONTINUED] olmesartan (BENICAR) 20 MG tablet Take 1 tablet (20 mg total) by mouth once daily.    diclofenac sodium 1 % Gel Apply 2 g topically 4 (four) times daily.     No current facility-administered medications on file prior to visit.        Objective:     Vitals:    12/01/23 0833   BP: 130/84   Pulse: 77   Temp: 99 °F (37.2 °C)      Physical Exam  Constitutional:       General: He is not in acute distress.     Appearance: Normal appearance.    HENT:      Head: Normocephalic and atraumatic.      Right Ear: External ear normal.      Left Ear: External ear normal.      Nose: Nose normal.      Mouth/Throat:      Mouth: Mucous membranes are moist.      Pharynx: Oropharynx is clear.   Eyes:      Extraocular Movements: Extraocular movements intact.      Conjunctiva/sclera: Conjunctivae normal.      Pupils: Pupils are equal, round, and reactive to light.   Cardiovascular:      Rate and Rhythm: Normal rate and regular rhythm.      Pulses: Normal pulses.      Heart sounds: Normal heart sounds.   Pulmonary:      Effort: Pulmonary effort is normal. No respiratory distress.      Breath sounds: Normal breath sounds. No wheezing.   Abdominal:      General: Abdomen is flat.   Musculoskeletal:         General: No swelling. Normal range of motion.      Cervical back: Normal range of motion and neck supple.   Skin:     General: Skin is warm and dry.      Findings: No rash.   Neurological:      Mental Status: He is alert and oriented to person, place, and time.      Gait: Gait normal.   Psychiatric:         Mood and Affect: Mood normal.         Behavior: Behavior normal.        Assessment:         1. Essential hypertension          Plan:   1. Essential hypertension  - olmesartan (BENICAR) 20 MG tablet; Take 1 tablet (20 mg total) by mouth once daily.  Dispense: 90 tablet; Refill: 3       No medication change made today.  Continue checking bp once weekly if needed.  ER Precautions discussed.  Contact clinic for any concerns.  Follow up 1 year w/ Champagne w/ annual labs.         Kentrell Torres NP   [facility]  12/1/23

## 2023-12-13 ENCOUNTER — PATIENT MESSAGE (OUTPATIENT)
Dept: FAMILY MEDICINE | Facility: CLINIC | Age: 58
End: 2023-12-13
Payer: COMMERCIAL

## 2023-12-15 ENCOUNTER — PATIENT MESSAGE (OUTPATIENT)
Dept: FAMILY MEDICINE | Facility: CLINIC | Age: 58
End: 2023-12-15
Payer: COMMERCIAL

## 2024-02-29 ENCOUNTER — CLINICAL SUPPORT (OUTPATIENT)
Dept: OTHER | Facility: CLINIC | Age: 59
End: 2024-02-29
Payer: COMMERCIAL

## 2024-02-29 DIAGNOSIS — Z00.8 ENCOUNTER FOR OTHER GENERAL EXAMINATION: ICD-10-CM

## 2024-02-29 PROCEDURE — 80061 LIPID PANEL: CPT | Mod: QW,S$GLB,, | Performed by: INTERNAL MEDICINE

## 2024-02-29 PROCEDURE — 99401 PREV MED CNSL INDIV APPRX 15: CPT | Mod: S$GLB,,, | Performed by: INTERNAL MEDICINE

## 2024-02-29 PROCEDURE — 82947 ASSAY GLUCOSE BLOOD QUANT: CPT | Mod: QW,S$GLB,, | Performed by: INTERNAL MEDICINE

## 2024-03-02 VITALS
DIASTOLIC BLOOD PRESSURE: 83 MMHG | SYSTOLIC BLOOD PRESSURE: 139 MMHG | HEIGHT: 69 IN | BODY MASS INDEX: 26.81 KG/M2 | WEIGHT: 181 LBS

## 2024-03-02 LAB
GLUCOSE SERPL-MCNC: 109 MG/DL (ref 60–140)
HDLC SERPL-MCNC: 45 MG/DL
POC CHOLESTEROL, LDL (DOCK): 135 MG/DL
POC CHOLESTEROL, TOTAL: 196 MG/DL
TRIGL SERPL-MCNC: 85 MG/DL

## 2024-03-02 NOTE — PROGRESS NOTES
Biometrics completed.    Results reviewed with a Registered Nurse; understanding of results and   educational materials was verbalized.   Chief Complaint   Patient presents with    Annual Wellness Visit    Benign Prostatic Hypertrophy    CHF    Cholesterol Problem    Hypertension    Other     Hx of thrombocytopenia, IFBS, and smoking       1. \"Have you been to the ER, urgent care clinic since your last visit? Hospitalized since your last visit? \" Yes [facility] on June 20,2022 for closed wedge compression fracture     2. \"Have you seen or consulted any other health care providers outside of the [address] [place] [address] since your last visit? \" No     3. For patients aged 39-70: Has the patient had a colonoscopy / FIT/ Cologuard? Yes - no Care Gap present      If the patient is female:    4. For patients aged 41-77: Has the patient had a mammogram within the past 2 years? NA - based on age or sex      11. For patients aged 21-65: Has the patient had a pap smear?  NA - based on age or sex

## 2024-09-30 ENCOUNTER — OFFICE VISIT (OUTPATIENT)
Dept: URGENT CARE | Facility: CLINIC | Age: 59
End: 2024-09-30
Payer: COMMERCIAL

## 2024-09-30 VITALS
HEIGHT: 69 IN | HEART RATE: 103 BPM | RESPIRATION RATE: 18 BRPM | WEIGHT: 181 LBS | BODY MASS INDEX: 26.81 KG/M2 | TEMPERATURE: 98 F | DIASTOLIC BLOOD PRESSURE: 98 MMHG | OXYGEN SATURATION: 96 % | SYSTOLIC BLOOD PRESSURE: 160 MMHG

## 2024-09-30 DIAGNOSIS — R19.7 DIARRHEA, UNSPECIFIED TYPE: ICD-10-CM

## 2024-09-30 DIAGNOSIS — H93.12 TINNITUS AURIUM, LEFT: ICD-10-CM

## 2024-09-30 DIAGNOSIS — Z20.822 ENCOUNTER FOR LABORATORY TESTING FOR COVID-19 VIRUS: Primary | ICD-10-CM

## 2024-09-30 DIAGNOSIS — R10.9 ABDOMINAL CRAMPING: ICD-10-CM

## 2024-09-30 LAB
CTP QC/QA: YES
SARS-COV-2 AG RESP QL IA.RAPID: NEGATIVE

## 2024-09-30 PROCEDURE — 99214 OFFICE O/P EST MOD 30 MIN: CPT | Mod: S$GLB,,, | Performed by: PHYSICIAN ASSISTANT

## 2024-09-30 PROCEDURE — 87811 SARS-COV-2 COVID19 W/OPTIC: CPT | Mod: QW,S$GLB,, | Performed by: PHYSICIAN ASSISTANT

## 2024-09-30 RX ORDER — CROMOLYN SODIUM 5.2 MG/ML
1 SPRAY, METERED NASAL 4 TIMES DAILY
Qty: 26 ML | Refills: 1 | Status: SHIPPED | OUTPATIENT
Start: 2024-09-30

## 2024-09-30 RX ORDER — AZELASTINE 1 MG/ML
1 SPRAY, METERED NASAL 2 TIMES DAILY
Qty: 30 ML | Refills: 0 | Status: SHIPPED | OUTPATIENT
Start: 2024-09-30 | End: 2025-09-30

## 2024-09-30 RX ORDER — FLUTICASONE PROPIONATE 50 MCG
1 SPRAY, SUSPENSION (ML) NASAL DAILY
Qty: 16 G | Refills: 3 | Status: SHIPPED | OUTPATIENT
Start: 2024-09-30

## 2024-09-30 RX ORDER — DICYCLOMINE HYDROCHLORIDE 10 MG/1
10 CAPSULE ORAL
Qty: 30 CAPSULE | Refills: 0 | Status: SHIPPED | OUTPATIENT
Start: 2024-09-30

## 2024-09-30 NOTE — PROGRESS NOTES
"Subjective:      Patient ID: Juarez Keane is a 59 y.o. male.    Vitals:  height is 5' 9.02" (1.753 m) and weight is 82.1 kg (181 lb). His oral temperature is 98.1 °F (36.7 °C). His blood pressure is 160/98 (abnormal) and his pulse is 103. His respiration is 18 and oxygen saturation is 96%.     Chief Complaint: Diarrhea    59 year old male presenting with diarrhea, abdominal pain, body aches, nasal congestion since Saturday(3 days)    Diarrhea   This is a new problem. The current episode started in the past 7 days (3 days ago). The problem occurs 2 to 4 times per day. The problem has been gradually worsening. The stool consistency is described as Watery. The patient states that diarrhea awakens him from sleep. Associated symptoms include arthralgias, myalgias and a URI. Pertinent negatives include no fever, headaches or vomiting. Nothing aggravates the symptoms. He has tried anti-motility drug for the symptoms. The treatment provided no relief. His past medical history is significant for irritable bowel syndrome. There is no history of inflammatory bowel disease.       Constitution: Negative for sweating, fatigue and fever.   HENT:  Positive for tinnitus.    Gastrointestinal:  Positive for abdominal bloating and diarrhea. Negative for vomiting.   Musculoskeletal:  Positive for joint pain and muscle ache. Negative for muscle cramps.   Skin:  Negative for erythema.   Neurological:  Negative for headaches.      Objective:     Physical Exam   Constitutional: He is oriented to person, place, and time. He appears well-developed. He is cooperative.  Non-toxic appearance. He does not appear ill. No distress.   HENT:   Head: Normocephalic and atraumatic.   Ears:   Right Ear: Hearing, tympanic membrane, external ear and ear canal normal.   Left Ear: Hearing, tympanic membrane, external ear and ear canal normal.   Nose: Nose normal. No mucosal edema, rhinorrhea or nasal deformity. No epistaxis. Right sinus exhibits no " maxillary sinus tenderness and no frontal sinus tenderness. Left sinus exhibits no maxillary sinus tenderness and no frontal sinus tenderness.   Mouth/Throat: Uvula is midline, oropharynx is clear and moist and mucous membranes are normal. No trismus in the jaw. Normal dentition. No uvula swelling. No oropharyngeal exudate, posterior oropharyngeal edema or posterior oropharyngeal erythema.   Eyes: Conjunctivae, EOM and lids are normal. Pupils are equal, round, and reactive to light. Right eye exhibits no discharge. Left eye exhibits no discharge. No scleral icterus.   Neck: Trachea normal and phonation normal. Neck supple. No JVD present. No tracheal deviation present. No thyromegaly present. No edema present. No erythema present. No neck rigidity present.   Cardiovascular: Normal rate, regular rhythm, normal heart sounds and normal pulses.   No murmur heard.Exam reveals no gallop and no friction rub.   Pulmonary/Chest: Effort normal and breath sounds normal. No stridor. No respiratory distress. He has no decreased breath sounds. He has no wheezes. He has no rhonchi. He has no rales. He exhibits no tenderness.   Abdominal: Normal appearance. He exhibits no distension. Soft. There is no abdominal tenderness. There is no rebound and no guarding.   Musculoskeletal: Normal range of motion.         General: No deformity. Normal range of motion.   Neurological: He is alert and oriented to person, place, and time. He exhibits normal muscle tone. Coordination normal.   Skin: Skin is warm, dry, intact, not diaphoretic, not pale and no rash. Capillary refill takes less than 2 seconds. No erythema   Psychiatric: His speech is normal and behavior is normal. Judgment and thought content normal.   Nursing note and vitals reviewed.    Results for orders placed or performed in visit on 09/30/24   SARS Coronavirus 2 Antigen, POCT Manual Read    Collection Time: 09/30/24  6:09 PM   Result Value Ref Range    SARS Coronavirus 2 Antigen  Negative Negative     Acceptable Yes     No results found.   Assessment:     1. Encounter for laboratory testing for COVID-19 virus    2. Diarrhea, unspecified type    3. Abdominal cramping    4. Tinnitus aurium, left        Plan:       Encounter for laboratory testing for COVID-19 virus  -     Cancel: POCT Urinalysis(Instrument)  -     SARS Coronavirus 2 Antigen, POCT Manual Read    Diarrhea, unspecified type  -     dicyclomine (BENTYL) 10 MG capsule; Take 1 capsule (10 mg total) by mouth 4 (four) times daily before meals and nightly.  Dispense: 30 capsule; Refill: 0    Abdominal cramping  -     dicyclomine (BENTYL) 10 MG capsule; Take 1 capsule (10 mg total) by mouth 4 (four) times daily before meals and nightly.  Dispense: 30 capsule; Refill: 0    Tinnitus aurium, left  -     azelastine (ASTELIN) 137 mcg (0.1 %) nasal spray; 1 spray (137 mcg total) by Nasal route 2 (two) times daily.  Dispense: 30 mL; Refill: 0  -     fluticasone propionate (FLONASE) 50 mcg/actuation nasal spray; 1 spray (50 mcg total) by Each Nostril route once daily.  Dispense: 16 g; Refill: 3  -     cromolyn (NASALCHROM) 5.2 mg/spray (4 %) nasal spray; 1 spray by Nasal route 4 (four) times daily.  Dispense: 26 mL; Refill: 1      No follow-ups on file. There are no Patient Instructions on file for this visit.

## 2024-10-07 ENCOUNTER — TELEPHONE (OUTPATIENT)
Dept: PHARMACY | Facility: CLINIC | Age: 59
End: 2024-10-07
Payer: COMMERCIAL

## 2024-10-08 NOTE — TELEPHONE ENCOUNTER
[facility]/Population Health Chart Review & Patient Outreach Details For Medication Adherence Project    Reason for Outreach Encounter: 3rd Party payor non-compliance report ([employer], [employer], C, etc)  2.  Patient Outreach Method: Reviewed patient chart   3.   Medication in question:   Hypertension Medications               olmesartan (BENICAR) 20 MG tablet Take 1 tablet (20 mg total) by mouth once daily.               Patient not taking. Reported on 9/30/2024       4.  Reviewed and or Updates Made To: Patient Chart  5. Outreach Outcomes and/or actions taken: Medication discontinued  Additional Notes:

## 2024-10-21 ENCOUNTER — TELEPHONE (OUTPATIENT)
Dept: FAMILY MEDICINE | Facility: CLINIC | Age: 59
End: 2024-10-21
Payer: COMMERCIAL

## 2024-10-21 NOTE — TELEPHONE ENCOUNTER
Spoke with pt wants to schedule an appt for his annual. Stated any Friday morning after 11/13/22  is fine. Informed pt Dr. Monte's schedule isn't open yet for November, but someone will call to schedule an appt for him once it is open. PT verbally understood.

## 2024-11-06 ENCOUNTER — PATIENT MESSAGE (OUTPATIENT)
Dept: FAMILY MEDICINE | Facility: CLINIC | Age: 59
End: 2024-11-06
Payer: COMMERCIAL

## 2024-11-21 ENCOUNTER — OFFICE VISIT (OUTPATIENT)
Dept: FAMILY MEDICINE | Facility: CLINIC | Age: 59
End: 2024-11-21
Payer: COMMERCIAL

## 2024-11-21 VITALS
BODY MASS INDEX: 26.12 KG/M2 | OXYGEN SATURATION: 98 % | HEIGHT: 69 IN | SYSTOLIC BLOOD PRESSURE: 160 MMHG | WEIGHT: 176.38 LBS | HEART RATE: 95 BPM | DIASTOLIC BLOOD PRESSURE: 10 MMHG

## 2024-11-21 DIAGNOSIS — I10 ESSENTIAL HYPERTENSION: ICD-10-CM

## 2024-11-21 DIAGNOSIS — R35.0 URINARY FREQUENCY: ICD-10-CM

## 2024-11-21 DIAGNOSIS — Z00.00 ANNUAL WELLNESS VISIT: Primary | ICD-10-CM

## 2024-11-21 DIAGNOSIS — H65.93 MIDDLE EAR EFFUSION, BILATERAL: ICD-10-CM

## 2024-11-21 DIAGNOSIS — H93.12 RINGING IN LEFT EAR: ICD-10-CM

## 2024-11-21 PROCEDURE — 99999 PR PBB SHADOW E&M-EST. PATIENT-LVL IV: CPT | Mod: PBBFAC,,,

## 2024-11-21 NOTE — PROGRESS NOTES
"Subjective:     Chief Complaint: Annual Exam     Juarez Keane is a 59 y.o. male, patient of Sandrine Olivo DO with a PMH listed below.     Presents for annual wellness visit.      Acute concerns: Voices urinary frequency, cloudy, and "a smell" the last 2 days. Denies dysuria. Also complains of ringing in left ear x 6 months.     Employment: [employer]  Living situation: Wife.   Pets: none  Sunscreen use: Denies.   Diet: Limiting Na+. Increasing fruits. Limiting sodas: 2-3/day (Was drinking 10+/day within last year).   Exercise: Denies. 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   Water intake: 5 16 oz macdonald daily while working.   Depressed mood: Denies.      Eye exam: Wears glasses. March 2024- [facility] in [place].   Prostate: PSA 2022: 0.37.  Previous colonoscopy or colon cancer screening: Voices done at , Due 3/29/2026.   Vaccines: Over due on shingles, flu.     Health Maintenance         Date Due Completion Date    Hepatitis C Screening Never done ---    HIV Screening Never done ---    Shingles Vaccine (1 of 2) Never done ---    Influenza Vaccine (1) 09/01/2024 11/13/2023 (Declined)    Override on 11/13/2023: Declined    Colorectal Cancer Screening 03/29/2026 3/29/2016    Hemoglobin A1c (Diabetic Prevention Screening) 11/15/2027 11/15/2024    Lipid Panel 11/15/2029 11/15/2024    TETANUS VACCINE 01/23/2030 1/23/2020    RSV Vaccine (Age 60+ and Pregnant patients) (1 - 1-dose 75+ series) 03/26/2040 ---               Past Medical History:   Diagnosis Date    Degenerative disc disease     GERD (gastroesophageal reflux disease)     Optic neuritis, right        Past Surgical History:   Procedure Laterality Date    Bilateral knee arthroscopic      HERNIA REPAIR      Left     SPINE SURGERY      cervical    VASECTOMY  7/1990         Current Outpatient Medications:     azelastine-fluticasone (DYMISTA) 137-50 mcg/spray Spry nassal spray, 1 spray by Each Nostril route 2 (two) times daily. (Patient " "not taking: Reported on 11/21/2024), Disp: 23 g, Rfl: 2    cromolyn (NASALCHROM) 5.2 mg/spray (4 %) nasal spray, 1 spray by Nasal route 4 (four) times daily. (Patient not taking: Reported on 11/21/2024), Disp: 26 mL, Rfl: 1    levocetirizine (XYZAL) 5 MG tablet, Take 1 tablet (5 mg total) by mouth every evening., Disp: 30 tablet, Rfl: 1    Review of Systems   Constitutional:  Negative for chills, fatigue, fever and unexpected weight change.   HENT:  Positive for ear pain (ringing left ear x 6 months). Negative for congestion, postnasal drip, sinus pressure, sinus pain and sore throat.    Eyes:  Negative for visual disturbance.   Respiratory:  Negative for cough and shortness of breath.    Cardiovascular:  Negative for chest pain and palpitations.   Gastrointestinal:  Negative for abdominal pain, constipation, diarrhea, nausea and vomiting.   Genitourinary:  Positive for frequency. Negative for dysuria, hematuria and urgency.   Musculoskeletal:  Negative for arthralgias and myalgias.   Skin:  Negative for rash.   Neurological:  Negative for dizziness, weakness, numbness and headaches.         Objective:     Vitals:    11/21/24 0813 11/21/24 0846   BP: (!) 142/89 (!) 160/10   BP Location: Left arm Left arm   Patient Position: Sitting Sitting   Pulse: 95    SpO2: 98%    Weight: 80 kg (176 lb 5.9 oz)    Height: 5' 9.02" (1.753 m)           Physical Exam  Vitals reviewed.   Constitutional:       General: He is awake. He is not in acute distress.     Appearance: He is not ill-appearing.   HENT:      Right Ear: Ear canal and external ear normal. A middle ear effusion is present. Tympanic membrane is not perforated, erythematous, retracted or bulging.      Left Ear: Ear canal and external ear normal. A middle ear effusion is present. Tympanic membrane is not perforated, erythematous, retracted or bulging.      Nose: No congestion or rhinorrhea.      Mouth/Throat:      Mouth: Mucous membranes are moist.      Pharynx: Uvula " "midline. Posterior oropharyngeal erythema present. No oropharyngeal exudate.   Eyes:      General:         Right eye: No discharge.         Left eye: No discharge.      Pupils: Pupils are equal, round, and reactive to light.   Cardiovascular:      Rate and Rhythm: Normal rate and regular rhythm.   Pulmonary:      Effort: Pulmonary effort is normal. No respiratory distress.      Breath sounds: Normal breath sounds. No wheezing, rhonchi or rales.   Abdominal:      General: Abdomen is flat. Bowel sounds are normal. There is no distension.      Palpations: Abdomen is soft.      Tenderness: There is no abdominal tenderness.   Musculoskeletal:         General: Normal range of motion.      Right lower leg: No edema.      Left lower leg: No edema.   Lymphadenopathy:      Cervical: No cervical adenopathy.   Skin:     General: Skin is warm and dry.   Neurological:      Mental Status: He is alert and oriented to person, place, and time.      GCS: GCS eye subscore is 4. GCS verbal subscore is 5. GCS motor subscore is 6.      Sensory: No sensory deficit.      Motor: No weakness.   Psychiatric:         Mood and Affect: Mood normal.         Behavior: Behavior normal. Behavior is cooperative.         Thought Content: Thought content normal.             Assessment:         ICD-10-CM ICD-9-CM   1. Annual wellness visit  Z00.00 V70.0   2. Essential hypertension  I10 401.9   3. Urinary frequency  R35.0 788.41   4. Middle ear effusion, bilateral  H65.93 381.4   5. Ringing in left ear  H93.12 388.30       Plan:       Annual wellness visit  -Declined Hep C/HIV screening.  -Declined Shingles and influenza vaccine.     Essential hypertension  -Chronic. Elevated at visit today,142/89 initially with MA. Repeat 160/100 manual per myself. Asymptomatic  -Removed off all medications per Cardiology at [facility] 6 months ago per patient after having near syncope/dizzy episodes. Not scheduled to see Cards again till end of December. Voices BP at home "all " "over."   -Encouraged patient to check BP daily, record on BP log provided, and to bring both log and home machine to next visit.   -Continue diet and lifestyle modifications.   -Follow up 2 weeks.      Urinary frequency  -     Urinalysis; Future; Expected date: 11/21/2024  -     Urine culture; Future; Expected date: 11/21/2024    Middle ear effusion, bilateral  Ringing in left ear  -Ringing chronic x 6+ months.   -Continue using azelastine-fluticasone and cromolyn nasal spray as prescribed.   -RTC if symptoms worsen or not improving. If not improved will refer to ENT for further evaluation.       RTC/ED precautions discussed where applicable.   Encouraged patient/caregiver to let me know if there are any further questions/concerns.   Patient/caretaker agrees with the treatment plan.     Follow up in about 2 weeks (around 12/5/2024) for Blood pressure.    CHRISTOPHER Savage  Family Medicine  [facility]         "

## 2024-11-22 DIAGNOSIS — N30.01 ACUTE CYSTITIS WITH HEMATURIA: Primary | ICD-10-CM

## 2024-11-22 RX ORDER — CIPROFLOXACIN 500 MG/1
500 TABLET ORAL 2 TIMES DAILY
Qty: 14 TABLET | Refills: 0 | Status: SHIPPED | OUTPATIENT
Start: 2024-11-22 | End: 2024-11-29

## 2024-12-05 ENCOUNTER — OFFICE VISIT (OUTPATIENT)
Dept: FAMILY MEDICINE | Facility: CLINIC | Age: 59
End: 2024-12-05
Payer: COMMERCIAL

## 2024-12-05 VITALS
DIASTOLIC BLOOD PRESSURE: 90 MMHG | BODY MASS INDEX: 26.71 KG/M2 | WEIGHT: 180.31 LBS | SYSTOLIC BLOOD PRESSURE: 144 MMHG | OXYGEN SATURATION: 99 % | HEIGHT: 69 IN | HEART RATE: 78 BPM

## 2024-12-05 DIAGNOSIS — Z23 NEED FOR VACCINATION: ICD-10-CM

## 2024-12-05 DIAGNOSIS — H93.12 RINGING IN LEFT EAR: ICD-10-CM

## 2024-12-05 DIAGNOSIS — H65.93 MIDDLE EAR EFFUSION, BILATERAL: ICD-10-CM

## 2024-12-05 DIAGNOSIS — N30.01 ACUTE CYSTITIS WITH HEMATURIA: ICD-10-CM

## 2024-12-05 DIAGNOSIS — I10 ESSENTIAL HYPERTENSION: Primary | ICD-10-CM

## 2024-12-05 PROCEDURE — 99999 PR PBB SHADOW E&M-EST. PATIENT-LVL IV: CPT | Mod: PBBFAC,,,

## 2024-12-05 RX ORDER — OLMESARTAN MEDOXOMIL 20 MG/1
20 TABLET ORAL DAILY
Qty: 30 TABLET | Refills: 0 | Status: CANCELLED | OUTPATIENT
Start: 2024-12-05

## 2024-12-05 RX ORDER — LEVOCETIRIZINE DIHYDROCHLORIDE 5 MG/1
5 TABLET, FILM COATED ORAL NIGHTLY
Qty: 30 TABLET | Refills: 2 | Status: SHIPPED | OUTPATIENT
Start: 2024-12-05

## 2024-12-05 NOTE — PROGRESS NOTES
"Subjective:        Juarez Keane is a 59 y.o. male, patient of Sandrine Olivo DO with a PMH listed below.       History of Present Illness    CHIEF COMPLAINT:  Juarez presents today for a two-week blood pressure follow-up.    HYPERTENSION:  He reports recent elevated blood pressure readings, with some starting to come down. Today's reading was 144/90 at visit. He denies symptoms associated with high blood pressure, including headaches, nausea, vomiting, chest pain, or vision issues. Readings are typically lower in the evening. He previously used Amlodipine and Losartan but experienced side effects including leg swelling, body aches, and joint pain. A trial of Olmesartan caused lightheadedness upon standing. Voices was removed off all BP medications 6 months ago and scheduled to follow up with Cardiology at [facility] on 12/19/2024 for reevaluation.   Home BP log:        ALLERGIES:  He reports ongoing nasal congestion, particularly on one side, and ringing in his ear. Previously received steroid injections for allergy management, which were most effective but are no longer offered. Over-the-counter antihistamines including Zyrtec and Allegra provided no significant relief. He was unable to tolerate Claritin D due to feeling "weird" and concerns about its effect on blood pressure. His occupation involves chipping trees and various materials, suggesting frequent exposure to potential allergens.    RECENT URINARY TRACT INFECTION:  He reports a recent urinary tract infection that has resolved after treatment with Cipro. Initial symptoms included cloudy urine with odor and increased urinary frequency, without burning sensation during urination.    LIFESTYLE:  He has increased water intake and significantly reduced soda consumption from approximately 10-11 sodas per day, resulting in more frequent urination.     No other concerns voiced at this visit.             Review of Systems   Constitutional:  Negative for chills " "and fever.   Eyes:  Negative for visual disturbance.   Respiratory:  Negative for cough and shortness of breath.    Cardiovascular:  Negative for chest pain, palpitations and leg swelling.   Gastrointestinal:  Negative for abdominal pain, constipation, diarrhea, nausea and vomiting.   Genitourinary:  Negative for dysuria, flank pain, frequency, hematuria and urgency.   Skin:  Negative for rash.   Neurological:  Negative for dizziness and headaches.          Objective:     Vitals:    12/05/24 0807 12/05/24 0835   BP: (!) 144/94 (!) 144/90   BP Location: Left arm Left arm   Patient Position: Sitting Sitting   Pulse: 78    SpO2: 99%    Weight: 81.8 kg (180 lb 5.4 oz)    Height: 5' 9.02" (1.753 m)           Physical Exam  Vitals reviewed.   Constitutional:       General: He is awake. He is not in acute distress.  HENT:      Mouth/Throat:      Mouth: Mucous membranes are moist.   Cardiovascular:      Rate and Rhythm: Normal rate and regular rhythm.   Pulmonary:      Effort: Pulmonary effort is normal. No respiratory distress.      Breath sounds: Normal breath sounds.   Musculoskeletal:         General: Normal range of motion.   Skin:     General: Skin is warm and dry.   Neurological:      General: No focal deficit present.      Mental Status: He is alert and oriented to person, place, and time.   Psychiatric:         Mood and Affect: Mood normal.         Behavior: Behavior normal. Behavior is cooperative.             Assessment:         ICD-10-CM ICD-9-CM   1. Essential hypertension  I10 401.9   2. Acute cystitis with hematuria  N30.01 595.0   3. Middle ear effusion, bilateral  H65.93 381.4   4. Ringing in left ear  H93.12 388.30   5. Need for vaccination  Z23 V05.9       Plan:       Essential hypertension  -Elevated at visit today,144/94. Repeat 144/94. Elevated readings at home 130-140's/79-90's.  -Asymptomatic at visit today.   -Not currently on any medications. Voices removed off all BP medications 6 months ago 2/2 " "medication SE's.   -Offered to start antihypertensive but declined. Wants to defer to Cardiology at [facility] as scheduled (12/19/2024) for elevated BP treatment.  -Advised to continue checking BP daily and bring log to Cardiology visit.   -Discussed diet (limiting sodium) and lifestyle modifications.   -Follow up with PCP as needed.     Acute cystitis with hematuria  -All symptoms resolved. Denies any current complaints at visit today.   -Follow up PRN.     Middle ear effusion, bilateral  Ringing in left ear  -     levocetirizine (XYZAL) 5 MG tablet; Take 1 tablet (5 mg total) by mouth every evening.  Dispense: 30 tablet; Refill: 2  -Start xyzal 5 mg daily.   -Continue Flonase and Astelin nasal spray daily.   -ENT referral offered, declined. Voices "my wife has an ENT that I will just follow up with."     Need for vaccination  -     influenza (Flulaval, Fluzone, Fluarix) 45 mcg/0.5 mL IM vaccine (> or = 6 mo) 0.5 mL      RTC/ED precautions discussed where applicable.   Encouraged patient/caregiver to let me know if there are any further questions/concerns.   Patient/caretaker agrees with the treatment plan.     Follow up if symptoms worsen or fail to improve.      CORNELIA Savage-SABA  Family Medicine  [facility]     This note was generated with the assistance of ambient listening technology. Verbal consent was obtained by the patient and accompanying visitor(s) for the recording of patient appointment to facilitate this note. I attest to having reviewed and edited the generated note for accuracy, though some syntax or spelling errors may persist. Please contact the author of this note for any clarification.           "

## 2024-12-11 ENCOUNTER — PATIENT MESSAGE (OUTPATIENT)
Dept: ADMINISTRATIVE | Facility: HOSPITAL | Age: 59
End: 2024-12-11
Payer: COMMERCIAL

## 2024-12-13 ENCOUNTER — PATIENT OUTREACH (OUTPATIENT)
Dept: ADMINISTRATIVE | Facility: HOSPITAL | Age: 59
End: 2024-12-13
Payer: COMMERCIAL

## 2024-12-13 VITALS — DIASTOLIC BLOOD PRESSURE: 83 MMHG | SYSTOLIC BLOOD PRESSURE: 132 MMHG

## 2025-01-28 ENCOUNTER — CLINICAL SUPPORT (OUTPATIENT)
Dept: OTHER | Facility: CLINIC | Age: 60
End: 2025-01-28
Payer: COMMERCIAL

## 2025-01-28 DIAGNOSIS — Z00.8 ENCOUNTER FOR OTHER GENERAL EXAMINATION: ICD-10-CM

## 2025-01-28 PROCEDURE — 99401 PREV MED CNSL INDIV APPRX 15: CPT | Mod: S$GLB,,, | Performed by: INTERNAL MEDICINE

## 2025-01-28 PROCEDURE — 82947 ASSAY GLUCOSE BLOOD QUANT: CPT | Mod: QW,S$GLB,, | Performed by: INTERNAL MEDICINE

## 2025-01-28 PROCEDURE — 80061 LIPID PANEL: CPT | Mod: QW,S$GLB,, | Performed by: INTERNAL MEDICINE

## 2025-01-31 VITALS
BODY MASS INDEX: 26.22 KG/M2 | WEIGHT: 173 LBS | HEIGHT: 68 IN | SYSTOLIC BLOOD PRESSURE: 144 MMHG | DIASTOLIC BLOOD PRESSURE: 94 MMHG

## 2025-01-31 LAB
GLUCOSE SERPL-MCNC: 110 MG/DL (ref 60–140)
HDLC SERPL-MCNC: 48 MG/DL
POC CHOLESTEROL, LDL (DOCK): 155 MG/DL
POC CHOLESTEROL, TOTAL: 223 MG/DL
TRIGL SERPL-MCNC: 112 MG/DL

## 2025-02-06 ENCOUNTER — HOSPITAL ENCOUNTER (EMERGENCY)
Facility: HOSPITAL | Age: 60
Discharge: OTHER INSTITUTION | End: 2025-02-07
Attending: EMERGENCY MEDICINE
Payer: COMMERCIAL

## 2025-02-06 DIAGNOSIS — S72.002A CLOSED FRACTURE OF LEFT HIP, INITIAL ENCOUNTER: Primary | ICD-10-CM

## 2025-02-06 DIAGNOSIS — S72.142A CLOSED DISPLACED INTERTROCHANTERIC FRACTURE OF LEFT FEMUR, INITIAL ENCOUNTER: ICD-10-CM

## 2025-02-06 DIAGNOSIS — M79.606 LEG PAIN: ICD-10-CM

## 2025-02-06 DIAGNOSIS — Z01.810 PRE-OPERATIVE CARDIOVASCULAR EXAMINATION, HIGH RISK SURGERY: ICD-10-CM

## 2025-02-06 LAB
25(OH)D3+25(OH)D2 SERPL-MCNC: 30 NG/ML (ref 30–96)
ABO + RH BLD: NORMAL
ALBUMIN SERPL BCP-MCNC: 3.8 G/DL (ref 3.5–5.2)
ALP SERPL-CCNC: 81 U/L (ref 40–150)
ALT SERPL W/O P-5'-P-CCNC: 18 U/L (ref 10–44)
ANION GAP SERPL CALC-SCNC: 11 MMOL/L (ref 8–16)
APTT PPP: 23.4 SEC (ref 21–32)
AST SERPL-CCNC: 19 U/L (ref 10–40)
BASOPHILS # BLD AUTO: 0.06 K/UL (ref 0–0.2)
BASOPHILS NFR BLD: 0.4 % (ref 0–1.9)
BILIRUB SERPL-MCNC: 0.4 MG/DL (ref 0.1–1)
BLD GP AB SCN CELLS X3 SERPL QL: NORMAL
BUN SERPL-MCNC: 14 MG/DL (ref 6–20)
CALCIUM SERPL-MCNC: 8.4 MG/DL (ref 8.7–10.5)
CHLORIDE SERPL-SCNC: 95 MMOL/L (ref 95–110)
CO2 SERPL-SCNC: 28 MMOL/L (ref 23–29)
CREAT SERPL-MCNC: 0.9 MG/DL (ref 0.5–1.4)
DIFFERENTIAL METHOD BLD: ABNORMAL
EOSINOPHIL # BLD AUTO: 0 K/UL (ref 0–0.5)
EOSINOPHIL NFR BLD: 0.1 % (ref 0–8)
ERYTHROCYTE [DISTWIDTH] IN BLOOD BY AUTOMATED COUNT: 12.4 % (ref 11.5–14.5)
EST. GFR  (NO RACE VARIABLE): >60 ML/MIN/1.73 M^2
ESTIMATED AVG GLUCOSE: 120 MG/DL (ref 68–131)
GLUCOSE SERPL-MCNC: 129 MG/DL (ref 70–110)
HBA1C MFR BLD: 5.8 % (ref 4–5.6)
HCT VFR BLD AUTO: 41.4 % (ref 40–54)
HGB BLD-MCNC: 14.7 G/DL (ref 14–18)
IMM GRANULOCYTES # BLD AUTO: 0.07 K/UL (ref 0–0.04)
IMM GRANULOCYTES NFR BLD AUTO: 0.5 % (ref 0–0.5)
INR PPP: 1 (ref 0.8–1.2)
LYMPHOCYTES # BLD AUTO: 0.9 K/UL (ref 1–4.8)
LYMPHOCYTES NFR BLD: 6.6 % (ref 18–48)
MAGNESIUM SERPL-MCNC: 1.9 MG/DL (ref 1.6–2.6)
MCH RBC QN AUTO: 30.2 PG (ref 27–31)
MCHC RBC AUTO-ENTMCNC: 35.5 G/DL (ref 32–36)
MCV RBC AUTO: 85 FL (ref 82–98)
MONOCYTES # BLD AUTO: 1 K/UL (ref 0.3–1)
MONOCYTES NFR BLD: 7 % (ref 4–15)
NEUTROPHILS # BLD AUTO: 12.1 K/UL (ref 1.8–7.7)
NEUTROPHILS NFR BLD: 85.4 % (ref 38–73)
NRBC BLD-RTO: 0 /100 WBC
PHOSPHATE SERPL-MCNC: 3.4 MG/DL (ref 2.7–4.5)
PLATELET # BLD AUTO: 211 K/UL (ref 150–450)
PMV BLD AUTO: 9.4 FL (ref 9.2–12.9)
POTASSIUM SERPL-SCNC: 2.9 MMOL/L (ref 3.5–5.1)
PREALB SERPL-MCNC: 24 MG/DL (ref 20–43)
PROT SERPL-MCNC: 6.3 G/DL (ref 6–8.4)
PROTHROMBIN TIME: 11.4 SEC (ref 9–12.5)
RBC # BLD AUTO: 4.86 M/UL (ref 4.6–6.2)
SODIUM SERPL-SCNC: 134 MMOL/L (ref 136–145)
SPECIMEN OUTDATE: NORMAL
TRANSFERRIN SERPL-MCNC: 210 MG/DL (ref 200–375)
WBC # BLD AUTO: 14.2 K/UL (ref 3.9–12.7)

## 2025-02-06 PROCEDURE — 83036 HEMOGLOBIN GLYCOSYLATED A1C: CPT

## 2025-02-06 PROCEDURE — 63600175 PHARM REV CODE 636 W HCPCS: Mod: JZ,TB

## 2025-02-06 PROCEDURE — 96376 TX/PRO/DX INJ SAME DRUG ADON: CPT

## 2025-02-06 PROCEDURE — 85730 THROMBOPLASTIN TIME PARTIAL: CPT

## 2025-02-06 PROCEDURE — 84134 ASSAY OF PREALBUMIN: CPT

## 2025-02-06 PROCEDURE — 86900 BLOOD TYPING SEROLOGIC ABO: CPT

## 2025-02-06 PROCEDURE — 84100 ASSAY OF PHOSPHORUS: CPT

## 2025-02-06 PROCEDURE — 99285 EMERGENCY DEPT VISIT HI MDM: CPT | Mod: 25

## 2025-02-06 PROCEDURE — 93005 ELECTROCARDIOGRAM TRACING: CPT

## 2025-02-06 PROCEDURE — 85610 PROTHROMBIN TIME: CPT

## 2025-02-06 PROCEDURE — 80053 COMPREHEN METABOLIC PANEL: CPT

## 2025-02-06 PROCEDURE — 96374 THER/PROPH/DIAG INJ IV PUSH: CPT

## 2025-02-06 PROCEDURE — 84466 ASSAY OF TRANSFERRIN: CPT

## 2025-02-06 PROCEDURE — 96375 TX/PRO/DX INJ NEW DRUG ADDON: CPT

## 2025-02-06 PROCEDURE — 93010 ELECTROCARDIOGRAM REPORT: CPT | Mod: ,,, | Performed by: STUDENT IN AN ORGANIZED HEALTH CARE EDUCATION/TRAINING PROGRAM

## 2025-02-06 PROCEDURE — 25000003 PHARM REV CODE 250

## 2025-02-06 PROCEDURE — 82306 VITAMIN D 25 HYDROXY: CPT

## 2025-02-06 PROCEDURE — 83735 ASSAY OF MAGNESIUM: CPT

## 2025-02-06 PROCEDURE — 96361 HYDRATE IV INFUSION ADD-ON: CPT

## 2025-02-06 PROCEDURE — 85025 COMPLETE CBC W/AUTO DIFF WBC: CPT

## 2025-02-06 RX ORDER — HYDROMORPHONE HYDROCHLORIDE 1 MG/ML
0.5 INJECTION, SOLUTION INTRAMUSCULAR; INTRAVENOUS; SUBCUTANEOUS
Status: COMPLETED | OUTPATIENT
Start: 2025-02-06 | End: 2025-02-06

## 2025-02-06 RX ORDER — SODIUM CHLORIDE 9 MG/ML
1000 INJECTION, SOLUTION INTRAVENOUS
Status: COMPLETED | OUTPATIENT
Start: 2025-02-06 | End: 2025-02-06

## 2025-02-06 RX ORDER — CEFAZOLIN 2 G/1
2 INJECTION, POWDER, FOR SOLUTION INTRAMUSCULAR; INTRAVENOUS ONCE
Status: CANCELLED | OUTPATIENT
Start: 2025-02-06

## 2025-02-06 RX ORDER — HYDROCODONE BITARTRATE AND ACETAMINOPHEN 500; 5 MG/1; MG/1
TABLET ORAL
Status: CANCELLED | OUTPATIENT
Start: 2025-02-06

## 2025-02-06 RX ORDER — KETOROLAC TROMETHAMINE 30 MG/ML
15 INJECTION, SOLUTION INTRAMUSCULAR; INTRAVENOUS
Status: COMPLETED | OUTPATIENT
Start: 2025-02-06 | End: 2025-02-06

## 2025-02-06 RX ADMIN — HYDROMORPHONE HYDROCHLORIDE 0.5 MG: 1 INJECTION, SOLUTION INTRAMUSCULAR; INTRAVENOUS; SUBCUTANEOUS at 06:02

## 2025-02-06 RX ADMIN — SODIUM CHLORIDE 1000 ML: 9 INJECTION, SOLUTION INTRAVENOUS at 06:02

## 2025-02-06 RX ADMIN — HYDROMORPHONE HYDROCHLORIDE 0.5 MG: 1 INJECTION, SOLUTION INTRAMUSCULAR; INTRAVENOUS; SUBCUTANEOUS at 10:02

## 2025-02-06 RX ADMIN — KETOROLAC TROMETHAMINE 15 MG: 30 INJECTION, SOLUTION INTRAMUSCULAR; INTRAVENOUS at 06:02

## 2025-02-06 NOTE — ED PROVIDER NOTES
Encounter Date: 2/6/2025       History     Chief Complaint   Patient presents with    Fall     [facility] 10, slip and fall on gravel, fell on left side, keys also in left pocket, no penetrating trauma. Sharp pain upon palpation to hip. 10mg of morphine administered per EMS     Patient is a 59-year-old male with a past medical history of degenerative disc disease, GERD, and optic neuritis who presents to emergency room for left hip pain after fall that occurred just prior to arrival.  Patient states that he had a slip and fall on gravel while at work.  Landed on his left side.  No penetrating trauma.  He has had difficulty with lifting his leg and rates pain as 10/10 whenever having to do so.  Unable to bear weight.  EMS had given patient morphine prior to arrival.  Patient denies any weakness, numbness, or tingling to the leg.    The history is provided by the patient and the EMS personnel. No  was used.     Review of patient's allergies indicates:   Allergen Reactions    Sulfa (sulfonamide antibiotics)      Past Medical History:   Diagnosis Date    Degenerative disc disease     GERD (gastroesophageal reflux disease)     HTN (hypertension)     Optic neuritis, right      Past Surgical History:   Procedure Laterality Date    Bilateral knee arthroscopic      HERNIA REPAIR      Left     SPINE SURGERY      cervical    VASECTOMY  7/1990     Family History   Problem Relation Name Age of Onset    Cancer Father Jakob 68    Throat cancer Father Jakob     Hypertension Mother Cathy     Neuropathy Mother Cathy     Arthritis Mother Cathy     Heart disease Mother Cathy     Colon cancer Maternal Grandmother      Heart disease Neg Hx       Social History     Tobacco Use    Smoking status: Never    Smokeless tobacco: Never   Substance Use Topics    Alcohol use: Yes     Alcohol/week: 3.0 standard drinks of alcohol     Types: 3 Cans of beer per week     Comment: occasionally    Drug use: No      Review of Systems   Constitutional:  Negative for chills, diaphoresis, fatigue and fever.   Musculoskeletal:  Positive for arthralgias (left hip). Negative for joint swelling and myalgias.   Skin:  Negative for color change, rash and wound.   Neurological:  Negative for weakness and numbness.       Physical Exam     Initial Vitals [02/06/25 1622]   BP Pulse Resp Temp SpO2   108/71 94 20 98.1 °F (36.7 °C) 96 %      MAP       --         Physical Exam    Nursing note and vitals reviewed.  Constitutional: He appears well-developed and well-nourished. He is not diaphoretic. No distress.   Patient well-appearing.  Awake and alert.  No acute distress.  Maintaining airway appropriately.  Speaking in complete sentences.   HENT:   Head: Normocephalic and atraumatic.   Right Ear: External ear normal.   Left Ear: External ear normal.   Eyes: Conjunctivae and EOM are normal.   Neck: Neck supple.   Normal range of motion.  Pulmonary/Chest: No respiratory distress.   Musculoskeletal:      Cervical back: Normal range of motion and neck supple.      Left hip: Tenderness present. Decreased range of motion.        Legs:       Comments: Patient unable to flex or extend hip due to pain.  Dorsalis pedis pulses 2+.  No lower extremity swelling.  No obvious deformity.     Neurological: He is alert and oriented to person, place, and time. He has normal strength.   Skin: Skin is warm. Capillary refill takes less than 2 seconds.   Psychiatric: He has a normal mood and affect. His behavior is normal. Thought content normal.         ED Course   Procedures  Labs Reviewed   CBC W/ AUTO DIFFERENTIAL - Abnormal       Result Value    WBC 14.20 (*)     RBC 4.86      Hemoglobin 14.7      Hematocrit 41.4      MCV 85      MCH 30.2      MCHC 35.5      RDW 12.4      Platelets 211      MPV 9.4      Immature Granulocytes 0.5      Gran # (ANC) 12.1 (*)     Immature Grans (Abs) 0.07 (*)     Lymph # 0.9 (*)     Mono # 1.0      Eos # 0.0      Baso # 0.06       nRBC 0      Gran % 85.4 (*)     Lymph % 6.6 (*)     Mono % 7.0      Eosinophil % 0.1      Basophil % 0.4      Differential Method Automated     COMPREHENSIVE METABOLIC PANEL - Abnormal    Sodium 134 (*)     Potassium 2.9 (*)     Chloride 95      CO2 28      Glucose 129 (*)     BUN 14      Creatinine 0.9      Calcium 8.4 (*)     Total Protein 6.3      Albumin 3.8      Total Bilirubin 0.4      Alkaline Phosphatase 81      AST 19      ALT 18      eGFR >60      Anion Gap 11     PROTIME-INR    Prothrombin Time 11.4      INR 1.0     MAGNESIUM    Magnesium 1.9     PHOSPHORUS    Phosphorus 3.4     APTT    aPTT 23.4     PREALBUMIN    Prealbumin 24     HEMOGLOBIN A1C   VITAMIN D   TRANSFERRIN   TYPE & SCREEN    Group & Rh O POS      Indirect Charles NEG      Specimen Outdate 02/09/2025 23:59            Imaging Results              X-Ray Chest AP Portable (Final result)  Result time 02/06/25 19:25:48      Final result by Karri Rice DO (02/06/25 19:25:48)                   Impression:      Hypoexpanded lungs with mild right basilar atelectasis.      Electronically signed by: Karri Rice  Date:    02/06/2025  Time:    19:25               Narrative:    EXAMINATION:  XR CHEST AP PORTABLE    CLINICAL HISTORY:  Encounter for preprocedural cardiovascular examination    TECHNIQUE:  Single frontal view of the chest was performed.    COMPARISON:  02/26/2004.    FINDINGS:  There is right basilar atelectasis with elevation of the right hemidiaphragm.  The lungs are hypoexpanded.  The lungs are otherwise clear the pleural spaces are clear the cardiac silhouette is unremarkable.  Osseous structures are intact.  Cervical spine hardware partially imaged.                                       X-Ray Hip 2 or 3 views Left with Pelvis when performed (Final result)  Result time 02/06/25 17:09:16      Final result by Karri Rice DO (02/06/25 17:09:16)                   Impression:      Left proximal femoral fracture as  above.      Electronically signed by: Karri Rice  Date:    02/06/2025  Time:    17:09               Narrative:    EXAMINATION:  XR HIP WITH PELVIS WHEN PERFORMED 2 OR 3 VIEWS LEFT; XR FEMUR 2 VIEW LEFT    CLINICAL HISTORY:  Hip pain; Pain in leg, unspecified    TECHNIQUE:  AP view of the pelvis and frog leg lateral view of the left hip were performed.    AP and lateral radiographs of the proximal and distal left femur.    COMPARISON:  None    FINDINGS:  Pelvis and left hip: There is a mildly displaced and comminuted intertrochanteric fracture of the left proximal femur, with extension to the proximal femoral diametaphysis.  No additional fractures are seen.  There is overlying soft tissue edema.  The remaining osseous structures of the pelvis are intact.  The femoroacetabular joints are unremarkable bilaterally.  There are surgical clips.    Left femur: Left proximal femur fracture described above.  No additional fractures of the mid or distal femur are seen.  Mild tricompartmental joint space narrowing of the knee with marginal osteophytes.                                       X-Ray Femur Ap/Lat Left (Final result)  Result time 02/06/25 17:09:16      Final result by Karri Rice DO (02/06/25 17:09:16)                   Impression:      Left proximal femoral fracture as above.      Electronically signed by: Karri Rice  Date:    02/06/2025  Time:    17:09               Narrative:    EXAMINATION:  XR HIP WITH PELVIS WHEN PERFORMED 2 OR 3 VIEWS LEFT; XR FEMUR 2 VIEW LEFT    CLINICAL HISTORY:  Hip pain; Pain in leg, unspecified    TECHNIQUE:  AP view of the pelvis and frog leg lateral view of the left hip were performed.    AP and lateral radiographs of the proximal and distal left femur.    COMPARISON:  None    FINDINGS:  Pelvis and left hip: There is a mildly displaced and comminuted intertrochanteric fracture of the left proximal femur, with extension to the proximal femoral diametaphysis.  No additional  fractures are seen.  There is overlying soft tissue edema.  The remaining osseous structures of the pelvis are intact.  The femoroacetabular joints are unremarkable bilaterally.  There are surgical clips.    Left femur: Left proximal femur fracture described above.  No additional fractures of the mid or distal femur are seen.  Mild tricompartmental joint space narrowing of the knee with marginal osteophytes.                                       Medications   HYDROmorphone injection 0.5 mg (0.5 mg Intravenous Given 2/6/25 1827)   ketorolac injection 15 mg (15 mg Intravenous Given 2/6/25 1826)   0.9% NaCl infusion (1,000 mLs Intravenous New Bag 2/6/25 1826)     Medical Decision Making  Patient presents to emergency room for left hip pain after fall.  Vital signs stable and within normal limits.  Physical exam as stated above.    Differential Diagnosis includes, but is not limited to fracture, dislocation, nerve injury/palsy, vascular injury, DVT, septic joint, cellulitis, bursitis, muscle strain, ligament tear/sprain, laceration, foreign body, abrasion, soft tissue contusion, osteoarthritis, or gout.  I do not suspect nerve or vascular injury, as sensation and pulses intact.  No significant extremity edema that would suggest DVT.  Patient with adequate range of motion.  Unlikely septic joint.  No evidence of laceration or abrasion on physical exam.  X-ray with evidence of femoral fracture.  Patient will need admission for emergent surgery.    All historical, clinical, radiographic, and laboratory findings were reviewed with the patient/family in detail along with the indications for transfer to an outside facility (rather than admission to our facility) secondary to lack of necessary orthopedic surgery services and a need for surgery.  All remaining questions and concerns were addressed at that time and the patient/family communicates understanding and agrees to proceed accordingly.  Similarly all pertinent details  of the encounter were discussed with Dr. Zamudio and orthopedic surgery team who agrees to accept the patient in transfer based on the needs/patient preferences outlined above.  Patient will be transferred by [facility] ambulance services.    Problems Addressed:  Closed fracture of left hip, initial encounter: acute illness or injury  Leg pain: acute illness or injury  Pre-operative cardiovascular examination, high risk surgery: acute illness or injury    Amount and/or Complexity of Data Reviewed  External Data Reviewed: notes.     Details: Patient last seen by primary care in 12/2024.  Not currently on any medications for high blood pressure.  Labs: ordered. Decision-making details documented in ED Course.  Radiology: ordered. Decision-making details documented in ED Course.  ECG/medicine tests: ordered.    Risk  Prescription drug management.  Decision regarding hospitalization.  Risk Details: Comorbidities taken into consideration during the patient's evaluation and treatment include degenerative disc disease, GERD, hypertension, and optic neuritis.    Social determinants of health taken into consideration during development of our treatment plan include difficulty in obtaining follow-up, obtaining medications, health literacy, access to healthy options for preventative/conservative management, and/or support systems due to, but not limited to, transportation limitations, socioeconomic status, and environmental factors.                ED Course as of 02/06/25 2002   Thu Feb 06, 2025 1709 Discussed patient with Dr. Qiu, Orthopedics, who states that patient will need transfer if radiologist reading shows that fracture is in the femoral neck. However, can he can do the surgery if read as isolated to the intertrochanteric.  Pending radiology reading at this time. [BJ]   1712 X-Ray Hip 2 or 3 views Left with Pelvis when performed  Independently reviewed and agree with radiology reading: There is a mildly displaced and  "comminuted intertrochanteric fracture of the left proximal femur, with extension to the proximal femoral diametaphysis.  No additional fractures are seen.  There is overlying soft tissue edema.  The remaining osseous structures of the pelvis are intact.  The femoroacetabular joints are unremarkable bilaterally.  There are surgical clips. [BJ]   1745 Discussed with Dr. Qiu over secure chat.  Patient will need transfer to [facility] orthopedics for repair. [BJ]   1759 Called [facility] within 24 hours [BJ]   1822 Patient complaining of pain at this time.  Blood pressure 111/65.  Toradol ordered.  Will hold Dilaudid and give IV fluids. [BJ]   1822 CBC and CMP ordered. [BJ]   1824 Per Dr. Hampton with [facility] Orthopedic surgery: "left intertroch fx with fx distal extension into subtroch region in [place] ER, dr qiu on call not comfortable and requests txfr. standard of care is surgery within 24hrs. transfer center says they are working on a bed and if no bed by 11-12pm they will send to ER so he can have surgery tomorrow at [facility]." [BJ]   1835 Patient will be admitted to Dr. Zamudio [facility] medicine service at [facility].  Will receive surgery tomorrow. [BJ]   1845 ECG No STEMI NSR at 83, normal axis, normal intervals, no ectopy [AT]   1852 CBC auto differential(!)  CBC with leukocytosis to 14.  Likely due to injury.  H/H stable and within normal limits.  Platelet count within normal limits. [BJ]      ED Course User Index  [AT] Stephanie Yeager MD  [BJ] Umu Robertson PA-C                           Clinical Impression:  Final diagnoses:  [M79.606] Leg pain  [Z01.810] Pre-operative cardiovascular examination, high risk surgery  [S72.002A] Closed fracture of left hip, initial encounter (Primary)          ED Disposition Condition    Transfer to Another Facility Stable               This note was partially created using [employer] Voice Recognition software. Typographical and content errors may occur with this " process. While efforts are made to detect and correct such errors, in some cases errors will persist. For this reason, wording in this document should be considered in the proper context and not strictly verbatim.        Umu Robertson PA-C  02/06/25 2002

## 2025-02-06 NOTE — ED NOTES
Pt presents to ED after a trip and fall, did not hit head, no LOC. Obvious deformity to LLE (appears shortened), stabilized pta. Received 10mg morphine en route, denies pain at rest at this time. Denies numbness/tingling or bowel incontinence. Denies CP, SOB, dizziness currently or prior to event.

## 2025-02-07 ENCOUNTER — ANESTHESIA (OUTPATIENT)
Dept: SURGERY | Facility: HOSPITAL | Age: 60
DRG: 482 | End: 2025-02-07
Payer: COMMERCIAL

## 2025-02-07 ENCOUNTER — HOSPITAL ENCOUNTER (INPATIENT)
Facility: HOSPITAL | Age: 60
LOS: 2 days | Discharge: HOME HEALTH | DRG: 482 | End: 2025-02-09
Attending: STUDENT IN AN ORGANIZED HEALTH CARE EDUCATION/TRAINING PROGRAM | Admitting: INTERNAL MEDICINE
Payer: OTHER MISCELLANEOUS

## 2025-02-07 ENCOUNTER — ANESTHESIA EVENT (OUTPATIENT)
Dept: SURGERY | Facility: HOSPITAL | Age: 60
DRG: 482 | End: 2025-02-07
Payer: COMMERCIAL

## 2025-02-07 ENCOUNTER — TELEPHONE (OUTPATIENT)
Dept: URGENT CARE | Facility: CLINIC | Age: 60
End: 2025-02-07
Payer: COMMERCIAL

## 2025-02-07 VITALS
RESPIRATION RATE: 16 BRPM | HEIGHT: 69 IN | SYSTOLIC BLOOD PRESSURE: 112 MMHG | TEMPERATURE: 98 F | DIASTOLIC BLOOD PRESSURE: 58 MMHG | BODY MASS INDEX: 25.92 KG/M2 | WEIGHT: 175 LBS | OXYGEN SATURATION: 97 % | HEART RATE: 92 BPM

## 2025-02-07 DIAGNOSIS — E87.6 HYPOKALEMIA: ICD-10-CM

## 2025-02-07 DIAGNOSIS — S72.002A CLOSED FRACTURE OF LEFT HIP, INITIAL ENCOUNTER: ICD-10-CM

## 2025-02-07 DIAGNOSIS — S72.142A CLOSED DISPLACED INTERTROCHANTERIC FRACTURE OF LEFT FEMUR, INITIAL ENCOUNTER: Primary | ICD-10-CM

## 2025-02-07 PROBLEM — R73.03 PREDIABETES: Status: ACTIVE | Noted: 2025-02-07

## 2025-02-07 PROCEDURE — 63600175 PHARM REV CODE 636 W HCPCS: Performed by: STUDENT IN AN ORGANIZED HEALTH CARE EDUCATION/TRAINING PROGRAM

## 2025-02-07 PROCEDURE — 27245 TREAT THIGH FRACTURE: CPT | Mod: LT,,, | Performed by: ORTHOPAEDIC SURGERY

## 2025-02-07 PROCEDURE — 99285 EMERGENCY DEPT VISIT HI MDM: CPT | Mod: 25

## 2025-02-07 PROCEDURE — 36000711: Performed by: ORTHOPAEDIC SURGERY

## 2025-02-07 PROCEDURE — 71000016 HC POSTOP RECOV ADDL HR: Performed by: ORTHOPAEDIC SURGERY

## 2025-02-07 PROCEDURE — 63600175 PHARM REV CODE 636 W HCPCS

## 2025-02-07 PROCEDURE — 21400001 HC TELEMETRY ROOM

## 2025-02-07 PROCEDURE — C1713 ANCHOR/SCREW BN/BN,TIS/BN: HCPCS | Performed by: ORTHOPAEDIC SURGERY

## 2025-02-07 PROCEDURE — 25000003 PHARM REV CODE 250: Performed by: NURSE ANESTHETIST, CERTIFIED REGISTERED

## 2025-02-07 PROCEDURE — 0QS706Z REPOSITION LEFT UPPER FEMUR WITH INTRAMEDULLARY INTERNAL FIXATION DEVICE, OPEN APPROACH: ICD-10-PCS | Performed by: ORTHOPAEDIC SURGERY

## 2025-02-07 PROCEDURE — 37000008 HC ANESTHESIA 1ST 15 MINUTES: Performed by: ORTHOPAEDIC SURGERY

## 2025-02-07 PROCEDURE — 63600175 PHARM REV CODE 636 W HCPCS: Performed by: ANESTHESIOLOGY

## 2025-02-07 PROCEDURE — 36000710: Performed by: ORTHOPAEDIC SURGERY

## 2025-02-07 PROCEDURE — D9220A PRA ANESTHESIA: Mod: CRNA,,, | Performed by: NURSE ANESTHETIST, CERTIFIED REGISTERED

## 2025-02-07 PROCEDURE — 25000003 PHARM REV CODE 250

## 2025-02-07 PROCEDURE — 25000003 PHARM REV CODE 250: Performed by: INTERNAL MEDICINE

## 2025-02-07 PROCEDURE — 99900035 HC TECH TIME PER 15 MIN (STAT)

## 2025-02-07 PROCEDURE — 63600175 PHARM REV CODE 636 W HCPCS: Performed by: NURSE ANESTHETIST, CERTIFIED REGISTERED

## 2025-02-07 PROCEDURE — 64448 NJX AA&/STRD FEM NRV NFS IMG: CPT

## 2025-02-07 PROCEDURE — C1769 GUIDE WIRE: HCPCS | Performed by: ORTHOPAEDIC SURGERY

## 2025-02-07 PROCEDURE — 25000003 PHARM REV CODE 250: Performed by: STUDENT IN AN ORGANIZED HEALTH CARE EDUCATION/TRAINING PROGRAM

## 2025-02-07 PROCEDURE — 27201423 OPTIME MED/SURG SUP & DEVICES STERILE SUPPLY: Performed by: ORTHOPAEDIC SURGERY

## 2025-02-07 PROCEDURE — 37000009 HC ANESTHESIA EA ADD 15 MINS: Performed by: ORTHOPAEDIC SURGERY

## 2025-02-07 PROCEDURE — 71000015 HC POSTOP RECOV 1ST HR: Performed by: ORTHOPAEDIC SURGERY

## 2025-02-07 PROCEDURE — 63600175 PHARM REV CODE 636 W HCPCS: Performed by: INTERNAL MEDICINE

## 2025-02-07 PROCEDURE — 94761 N-INVAS EAR/PLS OXIMETRY MLT: CPT

## 2025-02-07 PROCEDURE — 99223 1ST HOSP IP/OBS HIGH 75: CPT | Mod: 57,,, | Performed by: ORTHOPAEDIC SURGERY

## 2025-02-07 PROCEDURE — 71000033 HC RECOVERY, INTIAL HOUR: Performed by: ORTHOPAEDIC SURGERY

## 2025-02-07 PROCEDURE — D9220A PRA ANESTHESIA: Mod: ANES,,, | Performed by: ANESTHESIOLOGY

## 2025-02-07 DEVICE — LONG NAIL, LEFT
Type: IMPLANTABLE DEVICE | Site: FEMUR | Status: FUNCTIONAL
Brand: GAMMA

## 2025-02-07 DEVICE — LOCKING SCREW
Type: IMPLANTABLE DEVICE | Site: FEMUR | Status: FUNCTIONAL
Brand: T2 ALPHA

## 2025-02-07 DEVICE — PRECISION PIN TAPERED
Type: IMPLANTABLE DEVICE | Site: FEMUR | Status: FUNCTIONAL
Brand: GAMMA

## 2025-02-07 DEVICE — LAG SCREW
Type: IMPLANTABLE DEVICE | Site: FEMUR | Status: FUNCTIONAL
Brand: GAMMA

## 2025-02-07 RX ORDER — PROCHLORPERAZINE EDISYLATE 5 MG/ML
5 INJECTION INTRAMUSCULAR; INTRAVENOUS EVERY 6 HOURS PRN
Status: DISCONTINUED | OUTPATIENT
Start: 2025-02-07 | End: 2025-02-09 | Stop reason: HOSPADM

## 2025-02-07 RX ORDER — SODIUM CHLORIDE 0.9 % (FLUSH) 0.9 %
10 SYRINGE (ML) INJECTION
Status: DISCONTINUED | OUTPATIENT
Start: 2025-02-07 | End: 2025-02-07 | Stop reason: HOSPADM

## 2025-02-07 RX ORDER — MUPIROCIN 20 MG/G
1 OINTMENT TOPICAL
Status: COMPLETED | OUTPATIENT
Start: 2025-02-07 | End: 2025-02-07

## 2025-02-07 RX ORDER — POLYETHYLENE GLYCOL 3350 17 G/17G
17 POWDER, FOR SOLUTION ORAL DAILY
Status: DISCONTINUED | OUTPATIENT
Start: 2025-02-07 | End: 2025-02-09 | Stop reason: HOSPADM

## 2025-02-07 RX ORDER — TALC
6 POWDER (GRAM) TOPICAL NIGHTLY PRN
Status: DISCONTINUED | OUTPATIENT
Start: 2025-02-07 | End: 2025-02-07 | Stop reason: HOSPADM

## 2025-02-07 RX ORDER — AMOXICILLIN 250 MG
1 CAPSULE ORAL 2 TIMES DAILY
Status: DISCONTINUED | OUTPATIENT
Start: 2025-02-07 | End: 2025-02-09 | Stop reason: HOSPADM

## 2025-02-07 RX ORDER — LIDOCAINE HYDROCHLORIDE 20 MG/ML
INJECTION INTRAVENOUS
Status: DISCONTINUED | OUTPATIENT
Start: 2025-02-07 | End: 2025-02-07

## 2025-02-07 RX ORDER — CEFAZOLIN SODIUM 1 G/3ML
2 INJECTION, POWDER, FOR SOLUTION INTRAMUSCULAR; INTRAVENOUS ONCE
Status: DISCONTINUED | OUTPATIENT
Start: 2025-02-07 | End: 2025-02-07

## 2025-02-07 RX ORDER — PROPOFOL 10 MG/ML
VIAL (ML) INTRAVENOUS
Status: DISCONTINUED | OUTPATIENT
Start: 2025-02-07 | End: 2025-02-07

## 2025-02-07 RX ORDER — ONDANSETRON HYDROCHLORIDE 2 MG/ML
INJECTION, SOLUTION INTRAVENOUS
Status: DISCONTINUED | OUTPATIENT
Start: 2025-02-07 | End: 2025-02-07

## 2025-02-07 RX ORDER — ONDANSETRON HYDROCHLORIDE 2 MG/ML
4 INJECTION, SOLUTION INTRAVENOUS
Status: COMPLETED | OUTPATIENT
Start: 2025-02-07 | End: 2025-02-07

## 2025-02-07 RX ORDER — PHENYLEPHRINE HYDROCHLORIDE 10 MG/ML
INJECTION INTRAVENOUS
Status: DISCONTINUED | OUTPATIENT
Start: 2025-02-07 | End: 2025-02-07

## 2025-02-07 RX ORDER — ONDANSETRON HYDROCHLORIDE 2 MG/ML
4 INJECTION, SOLUTION INTRAVENOUS EVERY 12 HOURS PRN
Status: DISCONTINUED | OUTPATIENT
Start: 2025-02-07 | End: 2025-02-09 | Stop reason: HOSPADM

## 2025-02-07 RX ORDER — POTASSIUM CHLORIDE 7.45 MG/ML
10 INJECTION INTRAVENOUS ONCE
Status: DISCONTINUED | OUTPATIENT
Start: 2025-02-07 | End: 2025-02-08

## 2025-02-07 RX ORDER — CEFAZOLIN 2 G/1
2 INJECTION, POWDER, FOR SOLUTION INTRAMUSCULAR; INTRAVENOUS
Status: COMPLETED | OUTPATIENT
Start: 2025-02-07 | End: 2025-02-07

## 2025-02-07 RX ORDER — KETAMINE HCL IN 0.9 % NACL 50 MG/5 ML
SYRINGE (ML) INTRAVENOUS
Status: DISCONTINUED | OUTPATIENT
Start: 2025-02-07 | End: 2025-02-07

## 2025-02-07 RX ORDER — HYDROMORPHONE HYDROCHLORIDE 1 MG/ML
1 INJECTION, SOLUTION INTRAMUSCULAR; INTRAVENOUS; SUBCUTANEOUS
Status: COMPLETED | OUTPATIENT
Start: 2025-02-07 | End: 2025-02-07

## 2025-02-07 RX ORDER — HYDROCODONE BITARTRATE AND ACETAMINOPHEN 500; 5 MG/1; MG/1
TABLET ORAL
Status: DISCONTINUED | OUTPATIENT
Start: 2025-02-07 | End: 2025-02-08

## 2025-02-07 RX ORDER — MUPIROCIN 20 MG/G
1 OINTMENT TOPICAL 2 TIMES DAILY
Status: DISCONTINUED | OUTPATIENT
Start: 2025-02-07 | End: 2025-02-09 | Stop reason: HOSPADM

## 2025-02-07 RX ORDER — FENTANYL CITRATE 50 UG/ML
25 INJECTION, SOLUTION INTRAMUSCULAR; INTRAVENOUS EVERY 5 MIN PRN
Status: DISCONTINUED | OUTPATIENT
Start: 2025-02-07 | End: 2025-02-07 | Stop reason: HOSPADM

## 2025-02-07 RX ORDER — CEFAZOLIN 2 G/1
2 INJECTION, POWDER, FOR SOLUTION INTRAMUSCULAR; INTRAVENOUS
Status: DISCONTINUED | OUTPATIENT
Start: 2025-02-07 | End: 2025-02-07 | Stop reason: HOSPADM

## 2025-02-07 RX ORDER — TRANEXAMIC ACID 100 MG/ML
INJECTION, SOLUTION INTRAVENOUS
Status: DISCONTINUED | OUTPATIENT
Start: 2025-02-07 | End: 2025-02-07

## 2025-02-07 RX ORDER — FENTANYL CITRATE 50 UG/ML
25-200 INJECTION, SOLUTION INTRAMUSCULAR; INTRAVENOUS
Status: DISCONTINUED | OUTPATIENT
Start: 2025-02-07 | End: 2025-02-07

## 2025-02-07 RX ORDER — CHLORTHALIDONE 25 MG/1
25 TABLET ORAL DAILY
COMMUNITY
Start: 2024-12-20

## 2025-02-07 RX ORDER — BISACODYL 10 MG/1
10 SUPPOSITORY RECTAL DAILY PRN
Status: DISCONTINUED | OUTPATIENT
Start: 2025-02-07 | End: 2025-02-09 | Stop reason: HOSPADM

## 2025-02-07 RX ORDER — ROCURONIUM BROMIDE 10 MG/ML
INJECTION, SOLUTION INTRAVENOUS
Status: DISCONTINUED | OUTPATIENT
Start: 2025-02-07 | End: 2025-02-07

## 2025-02-07 RX ORDER — FENTANYL CITRATE 50 UG/ML
INJECTION, SOLUTION INTRAMUSCULAR; INTRAVENOUS
Status: DISCONTINUED | OUTPATIENT
Start: 2025-02-07 | End: 2025-02-07

## 2025-02-07 RX ORDER — KETOROLAC TROMETHAMINE 30 MG/ML
10 INJECTION, SOLUTION INTRAMUSCULAR; INTRAVENOUS
Status: COMPLETED | OUTPATIENT
Start: 2025-02-07 | End: 2025-02-07

## 2025-02-07 RX ORDER — METHOCARBAMOL 500 MG/1
500 TABLET, FILM COATED ORAL 4 TIMES DAILY
Status: DISCONTINUED | OUTPATIENT
Start: 2025-02-07 | End: 2025-02-07

## 2025-02-07 RX ORDER — METHOCARBAMOL 500 MG/1
500 TABLET, FILM COATED ORAL EVERY 6 HOURS
Status: DISCONTINUED | OUTPATIENT
Start: 2025-02-07 | End: 2025-02-09

## 2025-02-07 RX ORDER — CELECOXIB 200 MG/1
200 CAPSULE ORAL DAILY
Status: DISCONTINUED | OUTPATIENT
Start: 2025-02-07 | End: 2025-02-09 | Stop reason: HOSPADM

## 2025-02-07 RX ORDER — ACETAMINOPHEN 500 MG
1000 TABLET ORAL EVERY 6 HOURS
Status: COMPLETED | OUTPATIENT
Start: 2025-02-07 | End: 2025-02-09

## 2025-02-07 RX ORDER — CHLORTHALIDONE 25 MG/1
25 TABLET ORAL DAILY
Status: DISCONTINUED | OUTPATIENT
Start: 2025-02-08 | End: 2025-02-09 | Stop reason: HOSPADM

## 2025-02-07 RX ORDER — DEXAMETHASONE SODIUM PHOSPHATE 4 MG/ML
INJECTION, SOLUTION INTRA-ARTICULAR; INTRALESIONAL; INTRAMUSCULAR; INTRAVENOUS; SOFT TISSUE
Status: DISCONTINUED | OUTPATIENT
Start: 2025-02-07 | End: 2025-02-07

## 2025-02-07 RX ORDER — ROPIVACAINE HYDROCHLORIDE 5 MG/ML
INJECTION, SOLUTION EPIDURAL; INFILTRATION; PERINEURAL
Status: COMPLETED | OUTPATIENT
Start: 2025-02-07 | End: 2025-02-07

## 2025-02-07 RX ORDER — GLUCAGON 1 MG
1 KIT INJECTION
Status: DISCONTINUED | OUTPATIENT
Start: 2025-02-07 | End: 2025-02-07 | Stop reason: HOSPADM

## 2025-02-07 RX ORDER — MIDAZOLAM HYDROCHLORIDE 1 MG/ML
.5-4 INJECTION, SOLUTION INTRAMUSCULAR; INTRAVENOUS
Status: DISCONTINUED | OUTPATIENT
Start: 2025-02-07 | End: 2025-02-07

## 2025-02-07 RX ORDER — ROPIVACAINE HYDROCHLORIDE 2 MG/ML
INJECTION, SOLUTION EPIDURAL; INFILTRATION; PERINEURAL CONTINUOUS
Status: DISCONTINUED | OUTPATIENT
Start: 2025-02-07 | End: 2025-02-09 | Stop reason: HOSPADM

## 2025-02-07 RX ADMIN — ACETAMINOPHEN 1000 MG: 500 TABLET ORAL at 12:02

## 2025-02-07 RX ADMIN — MIDAZOLAM 2 MG: 1 INJECTION INTRAMUSCULAR; INTRAVENOUS at 09:02

## 2025-02-07 RX ADMIN — TRANEXAMIC ACID 1000 MG: 100 INJECTION, SOLUTION INTRAVENOUS at 10:02

## 2025-02-07 RX ADMIN — ACETAMINOPHEN 1000 MG: 500 TABLET ORAL at 05:02

## 2025-02-07 RX ADMIN — ROCURONIUM BROMIDE 50 MG: 10 INJECTION, SOLUTION INTRAVENOUS at 10:02

## 2025-02-07 RX ADMIN — FENTANYL CITRATE 50 MCG: 50 INJECTION, SOLUTION INTRAMUSCULAR; INTRAVENOUS at 09:02

## 2025-02-07 RX ADMIN — ONDANSETRON 4 MG: 2 INJECTION INTRAMUSCULAR; INTRAVENOUS at 08:02

## 2025-02-07 RX ADMIN — CEFAZOLIN 2 G: 2 INJECTION, POWDER, FOR SOLUTION INTRAMUSCULAR; INTRAVENOUS at 03:02

## 2025-02-07 RX ADMIN — SENNOSIDES AND DOCUSATE SODIUM 1 TABLET: 50; 8.6 TABLET ORAL at 08:02

## 2025-02-07 RX ADMIN — CELECOXIB 200 MG: 200 CAPSULE ORAL at 12:02

## 2025-02-07 RX ADMIN — MUPIROCIN 1 G: 20 OINTMENT TOPICAL at 09:02

## 2025-02-07 RX ADMIN — LIDOCAINE HYDROCHLORIDE 100 MG: 20 INJECTION INTRAVENOUS at 10:02

## 2025-02-07 RX ADMIN — Medication 20 MG: at 10:02

## 2025-02-07 RX ADMIN — ONDANSETRON 4 MG: 2 INJECTION INTRAMUSCULAR; INTRAVENOUS at 11:02

## 2025-02-07 RX ADMIN — TRANEXAMIC ACID 1000 MG: 100 INJECTION, SOLUTION INTRAVENOUS at 11:02

## 2025-02-07 RX ADMIN — MUPIROCIN 1 G: 20 OINTMENT TOPICAL at 08:02

## 2025-02-07 RX ADMIN — PHENYLEPHRINE HYDROCHLORIDE 100 MCG: 10 INJECTION INTRAVENOUS at 11:02

## 2025-02-07 RX ADMIN — KETOROLAC TROMETHAMINE 10 MG: 30 INJECTION, SOLUTION INTRAMUSCULAR; INTRAVENOUS at 08:02

## 2025-02-07 RX ADMIN — HYDROMORPHONE HYDROCHLORIDE 1 MG: 1 INJECTION, SOLUTION INTRAMUSCULAR; INTRAVENOUS; SUBCUTANEOUS at 08:02

## 2025-02-07 RX ADMIN — FENTANYL CITRATE 50 MCG: 50 INJECTION, SOLUTION INTRAMUSCULAR; INTRAVENOUS at 10:02

## 2025-02-07 RX ADMIN — SODIUM CHLORIDE: 9 INJECTION, SOLUTION INTRAVENOUS at 10:02

## 2025-02-07 RX ADMIN — SENNOSIDES AND DOCUSATE SODIUM 1 TABLET: 50; 8.6 TABLET ORAL at 01:02

## 2025-02-07 RX ADMIN — APIXABAN 2.5 MG: 2.5 TABLET, FILM COATED ORAL at 08:02

## 2025-02-07 RX ADMIN — FENTANYL CITRATE 25 MCG: 50 INJECTION, SOLUTION INTRAMUSCULAR; INTRAVENOUS at 12:02

## 2025-02-07 RX ADMIN — FENTANYL CITRATE 25 MCG: 50 INJECTION, SOLUTION INTRAMUSCULAR; INTRAVENOUS at 11:02

## 2025-02-07 RX ADMIN — Medication: at 12:02

## 2025-02-07 RX ADMIN — POLYETHYLENE GLYCOL 3350 17 G: 17 POWDER, FOR SOLUTION ORAL at 01:02

## 2025-02-07 RX ADMIN — DEXAMETHASONE SODIUM PHOSPHATE 4 MG: 4 INJECTION, SOLUTION INTRAMUSCULAR; INTRAVENOUS at 10:02

## 2025-02-07 RX ADMIN — METHOCARBAMOL 500 MG: 500 TABLET ORAL at 05:02

## 2025-02-07 RX ADMIN — CEFAZOLIN 2 G: 2 INJECTION, POWDER, FOR SOLUTION INTRAMUSCULAR; INTRAVENOUS at 10:02

## 2025-02-07 RX ADMIN — ROPIVACAINE HYDROCHLORIDE 40 ML: 5 INJECTION EPIDURAL; INFILTRATION; PERINEURAL at 10:02

## 2025-02-07 RX ADMIN — ROCURONIUM BROMIDE 20 MG: 10 INJECTION, SOLUTION INTRAVENOUS at 11:02

## 2025-02-07 RX ADMIN — PROPOFOL 150 MG: 10 INJECTION, EMULSION INTRAVENOUS at 10:02

## 2025-02-07 NOTE — ASSESSMENT & PLAN NOTE
Patient's blood pressure range in the last 24 hours was: BP  Min: 106/57  Max: 142/78.The patient's inpatient anti-hypertensive regimen is listed below:  Current Antihypertensives  , Daily, Oral  chlorthalidone tablet 25 mg, Daily, Oral    Plan  - BP is controlled, no changes needed to their regimen. Resume home medication of Chlorthalidone on 2/8. Holding dose for today, 2/7 as going to OR.   - Goal BP < 140/90.

## 2025-02-07 NOTE — HPI
59 y.o.male with essential hypertension, GERD, history of optic neuritis to right eye presented to the [facility] ER as transfer from [facility] ED with complaint of left hip pain. Patient states pain is sharp/stabbing in the left groin, upper leg and is aggravated by any weight bearing. He reports onset of symptoms was 1 day(s) after a fall after ambulating at work. Patient reports that he was at work yesterday and was walking up a gravel ramp and slipped in the gravel and fell on his left hip and upper leg area. After fall patient was able to sit up but could not stand up or bear weight to his left leg due to pain that he described as 10/10 pain to left groin and upper thigh area. Patient was transported to [facility] ED and he had X-rays done that revealed a closed displaced left intertrochanteric fracture. Ortho at [facility] consulted and recommended transfer to [facility] for Ortho intervention at [facility] so patient was transferred from ED to ED early this am. Patient currently rates his pain as 4/10 to left hip but just received IV Toradol and IV Dilaudid. Wife at bedside in ED. Ortho evaluated patient in ED and planning on taking to OR this am to repair left hip fracture. Patient denies head trauma. Patient denies to loss of consciousness, denies syncope, denies chest pain, denies dizziness prior or after fall. X-ray obtained in ER revealed left intertrochanteric hip fracture.   Prior to admission patient's functional mobility was independent with no assistive device for home and community distances. Patient does not have history of gait or balance problems. Patient does not have history of previous falls or fractures. Patient does not have previous cardiac history such as MI or CAD. Patient does not have previous history of TIA or stroke. Patient does not have previous history of compensated heart failure. Patient does not have history of diabetes. Patient does not have history of advanced kidney disease  with creatinine > 2. Patient currently lives with their spouse.  Labs reviewed. Potassium low at 2.9 and likely related to diuretic he takes for his HTN. Patient ordered to receive Potassium rider 10 mEq x 2 in ED to replace. Hgb 14.7. Creatinine normal at 0.9. LFT's normal. HgA1C 5.8% and mildly elevated. Vitamin D normal at 30. Magnesium normal at 1.9. INR normal at 1.0. EKG reviewed my myself and normal sinus rhythm with no ischemic changes. CXR reviewed by myself and normal with no infiltrates or masses or edema.   Patient reports only prescription medication he is currently taking is Chlorthalidone 25 mg po daily that he was recently prescribed for his HTN.

## 2025-02-07 NOTE — OP NOTE
OPERATIVE NOTE     DATE OF PROCEDURE:  2.7.;25     PREOPERATIVE DIAGNOSIS:           Left intertrochanteric hip fracture, closed, displaced, initial encounter  Fall from standing     POSTOPERATIVE DIAGNOSIS:         Left intertrochanteric hip fracture, closed, displaced, initial encounter  Fall from standing     PROCEDURE:              Open reduction internal fixation Left intertrochanteric hip fracture with intramedullary nail - [id number]     SURGEON:       Willie Taylor MD     ASSISTANT:                 Lisa Vasquez MD     ANESTHESIA:              General     EBL:                  200 mL     COMPLICATIONS:  none     IMPLANTS:       Brian Gamma 4, 11 x 360 mm  Compression screw, 100 mm  Distal interlocking screw, 5 mm, x1    SPECIMENS:    None     INDICATIONS FOR PROCEDURE:  59M  Mechanical fall 2.6.25  Immediate Left hip pain.  Inability to bear weight.  Brought to OSH emergency room. Dx: L hip IT fx  Transferred to our facility for further care   Evaluated by orthopedic resident on-call team, hospitalist team.  Physical exam x-rays indicated an intertrochanteric hip fracture.  Admitted to the hospital for further care     At the time of my evaluation, patient complained of isolated pain in Left hip, 7/10, sharp, stabbing, worse with motion, improved with rest.  No numbness and tingling.     Lives at home w/ family  Independent community ambulator  Works as   Denies prior heart attack, stroke, blood clot, cancer, tobacco use, diabetes     Discussed diagnosis of intertrochanteric hip fracture with both patient and family members.  Discussed both non operative and operative management options.  Non operative management would consist of bed rest, protected weight bearing and would likely result in a malunion/nonunion, prolonged pain, debility, and the medical comorbidities associated with prolonged bed rest/immobility.  Discussed operative intervention the form of open reduction internal  fixation with intramedullary nail.  Hopefully this would improve pain, alignment, mobility, healing potential, overall outcome.     The risks, benefits, and alternatives to surgery were discussed with the patient and/or family.    Specific risks discussed included, but were not limited to:  Limb length discrepancy, malrotation, head perforation, avascular necrosis, hip arthritis, abductor pain, limp, gait difficulty, failure of hardware, damage to nearby structures, including neurovascular structures leading to loss of function or loss of limb, bleeding, need for blood transfusion, pain, stiffness, scarring, numbness, tingling, weakness, compartment syndrome, malunion/nonunion, hardware failure, hardware prominence, infection, need for multiple staged procedures, prolonged antibiotics, iatrogenic fracture, heterotopic ossification, arthritis, a variety of medical complications including but not limited to heart attack, stroke, deep venous thrombosis, pulmonary embolism, prolonged hospitalization, prolonged intubation, and death.   Patient and/or family expressed an understanding and desires to proceed with surgery.   All questions were answered.  No guarantees were implied or stated.  Informed consent was obtained.        OPERATIVE PROCEDURE:  Patient met in the preop hold area, the correct site and side of surgery being the left hip were marked and verified.  Regional block placed by Anesthesia.  Patient brought back to the operative suite.     General anesthesia smoothly induced.    Fracture boots were placed.  Patient was transferred over to operative table and placed in supine position.  Peroneal post was placed. Operative side arm was draped across the chest and well padded. All bony prominences were appropriately padded.  We performed traction and reduction maneuvers, and were able to obtain a reasonable reduction of the fracture, but there was still some anterior translation/apex angulation of the femoral  neck and we planned for percutaneous assisted reduction..  Left lower extremity was prepped and draped in normal sterile fashion. Preoperative antibiotics of Ancef 2g were given. 1g IV TXA was given to aid in hemostasis     Time-out was performed verifying the correct patient, site/side of surgery, surgical consent, radiographs as applicable, preop antibiotics, necessary equipment, anticipated blood loss, length of procedure, postoperative disposition.     We began the case by marking anatomic landmarks on the patient.     Incision made overlying the anterior aspect of femoral neck near the calcar.  Bluntly dissected down.  Used a ball spike pusher to correct apex anterior translation/angulation which was confirmed on fluoroscopy.  That has ball spike was then held in place throughout the duration of the case.    Incision was made proximal to the greater trochanter.  Fascia was incised. Awl was utilized. Guide pin was entered in a center center position confirmed on both AP and lateral fluoroscopic imaging.  It was advanced to the level of the lesser trochanter. Opening Reamer with appropriate soft tissue guide was utilized to open the canal.  Ball-tipped guidewire was inserted in an intramedullary location confirmed on fluoroscopic imaging.  This was advanced to the superior pole of patella. Nail was measured. We reamed once with a 12.5mm reamer to confirm that a 11 mm nail would fit.  Appropriate size/length nail was placed on the insertion jig and inserted into the intramedullary canal, confirmed on multiplanar fluoroscopic imaging.  This was inserted to the appropriate depth and ensured to not perforate anterior femoral cortex on lateral imaging.       We then used the outrigger guide and triple sleeve inserted through a lateral incision on the thigh to place a guide pin in appropriate center center position through the femoral neck and head.  We used fluoroscopic imaging to confirm our  pin placement.  This was  appropriate tip apex distance.  We confirmed that the tip of the wire did not perforate femoral head.  We then used the lateral opening Reamer.  The appropriate size compression screw was then placed with the appropriate tip apex distance on AP and lateral views. Traction removed.  Fracture was compressed through the guide.  Set screw tightened.  Set screw loosened quarter turn to allow compression.    We turned our attention to placement of distal interlocking screws.  We chose to place 1 distal interlocking screw to provide stability in axial loading and rotation.  1 screw was placed from lateral to medial using percutaneous stab incision and perfect [place] technique. We confirmed appropriate placement of the screws on both AP and lateral fluoroscopic imaging.      Final fluoroscopic imaging of the entire femur was taken in AP and lateral views confirming appropriate hardware placement fracture reduction.     Wounds were thoroughly irrigated with saline. Fascia was closed with 0 Vicryl.  Subcutaneous tissue closed with 2 O Vicryl. Skin closed with 3 Monocryl. Dermabond applied.  Aquacel dressing applied.     Prior to final closure all counts were confirmed to be correct.  Patient tolerated procedure well, was extubated, transferred to PACU for further recovery.     At the conclusion of the procedure the patient had soft and compressible compartments, brisk cap refill, palpable DP and PT pulse in the operative extremity.     POSTOPERATIVE PLAN:  59-year-old male, fall 02/06/2025  Left intertrochanteric hip fracture     02/07/2025 - IM nail left hip IT fracture     Antibiotics x 24 hr  eliquis x 4 weeks postop for DVT prophylaxis     Hospitalist comanagement  Multimodal pain management  Calcium, vitamin-D, boost  PT     Fragility fracture Clinic referral     WBAT left lower extremity  Left lower extremity, range of motion as tolerated     X-ray AP pelvis and left femur AP lateral views at subsequent followups      Follow-up postop 2 weeks, 6 weeks, 3 months, 6 months, 1 year           =====================  Willie Taylor MD  Orthopaedic Surgery

## 2025-02-07 NOTE — PLAN OF CARE
Problem: Adult Inpatient Plan of Care  Goal: Plan of Care Review  Outcome: Progressing  Goal: Patient-Specific Goal (Individualized)  Outcome: Progressing  Goal: Absence of Hospital-Acquired Illness or Injury  Outcome: Progressing  Goal: Optimal Comfort and Wellbeing  Outcome: Progressing  Goal: Readiness for Transition of Care  Outcome: Progressing     Problem: Infection  Goal: Absence of Infection Signs and Symptoms  Outcome: Progressing     Problem: Hip Fracture Medical Management  Goal: Optimal Coping with Change in Health Status  Outcome: Progressing  Goal: Absence of Bleeding  Outcome: Progressing  Goal: Effective Bowel Elimination  Outcome: Progressing  Goal: Baseline Cognitive Function Maintained  Outcome: Progressing  Goal: Absence of Embolism  Outcome: Progressing  Goal: Fracture Stability  Outcome: Progressing  Goal: Optimal Functional Performance  Outcome: Progressing  Goal: Pain Control and Function  Outcome: Progressing  Goal: Effective Urinary Elimination  Outcome: Progressing     Problem: Surgery Nonspecified  Goal: Absence of Bleeding  Outcome: Progressing  Goal: Effective Bowel Elimination  Outcome: Progressing  Goal: Fluid and Electrolyte Balance  Outcome: Progressing  Goal: Blood Glucose Level Within Targeted Range  Outcome: Progressing  Goal: Absence of Infection Signs and Symptoms  Outcome: Progressing  Goal: Anesthesia/Sedation Recovery  Outcome: Progressing  Goal: Optimal Pain Control and Function  Outcome: Progressing  Goal: Nausea and Vomiting Relief  Outcome: Progressing  Goal: Effective Urinary Elimination  Outcome: Progressing  Goal: Effective Oxygenation and Ventilation  Outcome: Progressing     Problem: Anesthesia/Analgesia, Neuraxial  Goal: Safe, Effective Infusion Delivery  Outcome: Progressing  Goal: Absence of Infection Signs and Symptoms  Outcome: Progressing  Goal: Nausea and Vomiting Relief  Outcome: Progressing  Goal: Effective Pain Control  Outcome: Progressing  Goal:  Effective Oxygenation and Ventilation  Outcome: Progressing  Goal: Baseline Motor Function  Outcome: Progressing  Goal: Effective Urinary Elimination  Outcome: Progressing     Problem: Fall Injury Risk  Goal: Absence of Fall and Fall-Related Injury  Outcome: Progressing     Problem: Wound  Goal: Optimal Coping  Outcome: Progressing  Goal: Optimal Functional Ability  Outcome: Progressing  Goal: Absence of Infection Signs and Symptoms  Outcome: Progressing  Goal: Improved Oral Intake  Outcome: Progressing  Goal: Optimal Pain Control and Function  Outcome: Progressing  Goal: Skin Health and Integrity  Outcome: Progressing  Goal: Optimal Wound Healing  Outcome: Progressing

## 2025-02-07 NOTE — NURSING
Patient arrived to room 502 via bed, wife is present at bedside, VS taken and documented, admission documentation completed, SCD's applied, instructed on IS use, oriented to room and equipment, allowed time for questions, all questions answered, no further concerns voiced at this time, safety measures in place, call light within reach, instructed to call with any needs, verbalized understanding, continue current plan of care.

## 2025-02-07 NOTE — ED TRIAGE NOTES
Patient identifiers verified verbally with patient and correct for Juarez Keane.    Juarez Keane, a 59 y.o. male presents to the ED via EMS transfer with CC for eval for leg injury. Pt reports trip and fall on rocks yesterday. C/o left leg pain 8/10. Denies numbness or tingling. No other complaints currently.

## 2025-02-07 NOTE — SUBJECTIVE & OBJECTIVE
Past Medical History:   Diagnosis Date    Degenerative disc disease, cervical     Essential hypertension 07/21/2022    GERD (gastroesophageal reflux disease)     Optic neuritis, right     S/P cervical spinal fusion 11/02/2017       Past Surgical History:   Procedure Laterality Date    Bilateral knee arthroscopic      HERNIA REPAIR      Left     SPINE SURGERY      Cervical Fusion C5,C6    VASECTOMY  07/1990       Review of patient's allergies indicates:   Allergen Reactions    Sulfa (sulfonamide antibiotics)        Current Facility-Administered Medications on File Prior to Encounter   Medication    [COMPLETED] 0.9% NaCl infusion    [COMPLETED] HYDROmorphone injection 0.5 mg    [COMPLETED] HYDROmorphone injection 0.5 mg    [COMPLETED] ketorolac injection 15 mg     Current Outpatient Medications on File Prior to Encounter   Medication Sig    chlorthalidone (HYGROTEN) 25 MG Tab Take 25 mg by mouth once daily.    cromolyn (NASALCHROM) 5.2 mg/spray (4 %) nasal spray 1 spray by Nasal route 4 (four) times daily.    levocetirizine (XYZAL) 5 MG tablet Take 1 tablet (5 mg total) by mouth every evening.    [DISCONTINUED] azelastine-fluticasone (DYMISTA) 137-50 mcg/spray [place] nassal spray 1 spray by Each Nostril route 2 (two) times daily.     Family History       Problem Relation (Age of Onset)    Arthritis Mother    Cancer Father (68)    Colon cancer Maternal Grandmother    Heart disease Mother    Hypertension Mother    Neuropathy Mother    Throat cancer Father          Tobacco Use    Smoking status: Never    Smokeless tobacco: Never   Substance and Sexual Activity    Alcohol use: Yes     Alcohol/week: 3.0 standard drinks of alcohol     Types: 3 Cans of beer per week     Comment: occasionally    Drug use: No    Sexual activity: Yes     Partners: Female     Comment: vasectomy     Review of Systems   Constitutional:  Negative for chills and fever.   HENT:  Negative for congestion and sore throat.    Eyes:  Positive for visual  disturbance (Low vision right eye). Negative for pain.   Respiratory:  Negative for cough and shortness of breath.    Cardiovascular:  Negative for chest pain, palpitations and leg swelling.   Gastrointestinal:  Negative for abdominal pain, nausea and vomiting.   Endocrine: Negative for polydipsia and polyuria.   Genitourinary:  Negative for dysuria and hematuria.   Musculoskeletal:  Positive for arthralgias (Right hip) and myalgias (Right thigh).   Skin:  Negative for rash and wound.   Allergic/Immunologic: Negative for immunocompromised state.   Neurological:  Negative for dizziness and light-headedness.   Hematological:  Negative for adenopathy. Does not bruise/bleed easily.   Psychiatric/Behavioral:  Negative for agitation and confusion.      Objective:     Vital Signs (Most Recent):  Temp: 98.4 °F (36.9 °C) (02/07/25 0919)  Pulse: 81 (02/07/25 1015)  Resp: 19 (02/07/25 1015)  BP: 106/57 (02/07/25 1020)  SpO2: 98 % (02/07/25 1015) on room air Vital Signs (24h Range):  Temp:  [98.1 °F (36.7 °C)-98.7 °F (37.1 °C)] 98.4 °F (36.9 °C)  Pulse:  [81-95] 81  Resp:  [15-20] 19  SpO2:  [95 %-100 %] 98 %  BP: (106-142)/(55-85) 106/57     Weight: 79.4 kg (175 lb)  Body mass index is 25.84 kg/m².     Physical Exam  Vitals reviewed.   Constitutional:       General: He is awake. He is not in acute distress.     Appearance: Normal appearance. He is well-developed and normal weight. He is not ill-appearing.      Comments: Patient sitting up in stretcher in ED with his wife at bedside. Patient awake and alert and oriented x 4. Patient very healthy appearing and in no distress.    HENT:      Head: Normocephalic and atraumatic.      Nose: Nose normal.      Mouth/Throat:      Mouth: Mucous membranes are moist.      Pharynx: No oropharyngeal exudate.   Eyes:      General: No scleral icterus.     Conjunctiva/sclera: Conjunctivae normal.   Neck:      Vascular: No JVD.   Cardiovascular:      Rate and Rhythm: Normal rate and regular  rhythm.      Pulses:           Dorsalis pedis pulses are 2+ on the right side and 2+ on the left side.        Posterior tibial pulses are 2+ on the right side and 2+ on the left side.      Heart sounds: Normal heart sounds. No murmur heard.  Pulmonary:      Effort: Pulmonary effort is normal. No respiratory distress.      Breath sounds: Normal breath sounds. No wheezing or rales.   Abdominal:      General: Abdomen is flat. Bowel sounds are normal. There is no distension.      Palpations: Abdomen is soft.      Tenderness: There is no abdominal tenderness. There is no guarding.   Musculoskeletal:         General: Deformity (Left leg shortened and externally rotated) present.      Cervical back: Neck supple.      Right lower leg: No edema.      Left lower leg: No edema.   Skin:     General: Skin is warm.      Capillary Refill: Capillary refill takes less than 2 seconds.      Findings: No erythema.   Neurological:      Mental Status: He is alert and oriented to person, place, and time.   Psychiatric:         Mood and Affect: Mood normal.         Behavior: Behavior normal. Behavior is cooperative.         Thought Content: Thought content normal.         Judgment: Judgment normal.                Significant Labs: A1C:   Recent Labs   Lab 11/15/24  0603 02/06/25  1839   HGBA1C 5.5 5.8*     CBC:   Recent Labs   Lab 02/06/25  1839   WBC 14.20*   HGB 14.7   HCT 41.4        CMP:   Recent Labs   Lab 02/06/25  1839   *   K 2.9*   CL 95   CO2 28   *   BUN 14   CREATININE 0.9   CALCIUM 8.4*   PROT 6.3   ALBUMIN 3.8   BILITOT 0.4   ALKPHOS 81   AST 19   ALT 18   ANIONGAP 11       Coagulation:   Recent Labs   Lab 02/06/25  1839 02/06/25  1848   INR 1.0  --    APTT  --  23.4     Magnesium:   Recent Labs   Lab 02/06/25  1848   MG 1.9     Lab Results   Component Value Date    ZJOMZVWP57SI 30 02/06/2025     Significant Imaging: I have reviewed all pertinent imaging results/findings within the past 24 hours.

## 2025-02-07 NOTE — ED NOTES
Pt provided diet tray, informed unable to eat/drink/chew gum/mints after midnight. Spouse remains at bs.

## 2025-02-07 NOTE — ASSESSMENT & PLAN NOTE
Juarez Keane is a 59 y.o. male with PMH of GERD and HTN presenting with a left intertrochanteric femur fracture.  Closed, neurovascularly intact, and without any other musculoskeletal injuries on physical exam.     -Admit to medicine hip fracture service for pre-operative optimization  -Pain: multimodal regimen limiting narcotics   -DVT Ppx: hold chemical, FCD's at all times  -2u prbc prepped and held  -Abx: preop abx ordered  -Bed rest, [place]  -NPO for OR today  -marked and consented for OR today      Informed consent was obtained, patient marked, and case booked   Pre-operative labs:  Lab Results   Component Value Date    HGB 14.7 02/06/2025     02/06/2025    CREATININE 0.9 02/06/2025     (H) 02/06/2025      Lab Results   Component Value Date    INR 1.0 02/06/2025        Extensive discussion was held regarding the severity of the patient's injury and the high mortality rate associated with these fractures (30% at 1 year).  The risks/benefits/alternatives to operative management were explained, with risks including but not limited to: infection, bleeding, pain, stiffness, nerve/vascular injury, heterotopic ossification, leg length discrepancies, rotational deformities, non-union, mal-union, hardware failure, DVT/PE, even death. They express full understanding and wish to proceed with surgery.  No guarantees were implied or stated, and all questions were answered.                     No change in assessment. VSS, afebrile. Opens eyes to stimuli.

## 2025-02-07 NOTE — ANESTHESIA PREPROCEDURE EVALUATION
02/07/2025  Juarez Keane is a 59 y.o., male.  Pre-operative evaluation for Procedure(s) (LRB):  INSERTION, INTRAMEDULLARY ASHLEY, FEMUR (Left)    Juarez Keane is a 59 y.o. male     Patient Active Problem List   Diagnosis    S/P cervical spinal fusion    Essential hypertension    Closed displaced intertrochanteric fracture of left femur    Hypokalemia    Prediabetes       Review of patient's allergies indicates:   Allergen Reactions    Sulfa (sulfonamide antibiotics)        No current facility-administered medications on file prior to encounter.     Current Outpatient Medications on File Prior to Encounter   Medication Sig Dispense Refill    chlorthalidone (HYGROTEN) 25 MG Tab Take 25 mg by mouth once daily.      cromolyn (NASALCHROM) 5.2 mg/spray (4 %) nasal spray 1 spray by Nasal route 4 (four) times daily. 26 mL 1    levocetirizine (XYZAL) 5 MG tablet Take 1 tablet (5 mg total) by mouth every evening. 30 tablet 2       Past Surgical History:   Procedure Laterality Date    Bilateral knee arthroscopic      HERNIA REPAIR      Left     SPINE SURGERY      Cervical Fusion C5,C6    VASECTOMY  07/1990       Social History     Socioeconomic History    Marital status:     Number of children: 3   Occupational History     Employer: [employer]   Tobacco Use    Smoking status: Never    Smokeless tobacco: Never   Substance and Sexual Activity    Alcohol use: Yes     Alcohol/week: 3.0 standard drinks of alcohol     Types: 3 Cans of beer per week     Comment: occasionally    Drug use: No    Sexual activity: Yes     Partners: Female     Comment: vasectomy   Social History Narrative    He lives with his wife in their own home in [place]. He has 2 adult children in college.  He is a [employer] m[employer]on at [employer]. Non-smoker. No alcohol or substance abuse history                  Social Drivers of Health      Financial Resource Strain: Low Risk  (11/17/2024)    Overall Financial Resource Strain (CARDIA)     Difficulty of Paying Living Expenses: Not very hard   Food Insecurity: No Food Insecurity (11/17/2024)    Hunger Vital Sign     Worried About Running Out of Food in the Last Year: Never true     Ran Out of Food in the Last Year: Never true   Physical Activity: Insufficiently Active (11/17/2024)    Exercise Vital Sign     Days of Exercise per Week: 3 days     Minutes of Exercise per Session: 10 min   Stress: Stress Concern Present (11/17/2024)    Citizen of [place] of Occupational Health - Occupational Stress Questionnaire     Feeling of Stress : To some extent   Housing Stability: Unknown (11/17/2024)    Housing Stability Vital Sign     Unable to Pay for Housing in the Last Year: No         CBC:   Recent Labs     02/06/25 1839   WBC 14.20*   RBC 4.86   HGB 14.7   HCT 41.4      MCV 85   MCH 30.2   MCHC 35.5       CMP:   Recent Labs     02/06/25 1839 02/06/25 1848   *  --    K 2.9*  --    CL 95  --    CO2 28  --    BUN 14  --    CREATININE 0.9  --    *  --    MG  --  1.9   PHOS  --  3.4   CALCIUM 8.4*  --    ALBUMIN 3.8  --    PROT 6.3  --    ALKPHOS 81  --    ALT 18  --    AST 19  --    BILITOT 0.4  --        INR  Recent Labs     02/06/25 1839 02/06/25 1848   INR 1.0  --    APTT  --  23.4               Pre-op Assessment    I have reviewed the Patient Summary Reports.    I have reviewed the NPO Status.   I have reviewed the Medications.     Review of Systems  Anesthesia Hx:  No problems with previous Anesthesia   History of prior surgery of interest to airway management or planning:            Denies Personal Hx of Anesthesia complications.                    Cardiovascular:     Hypertension                                          Pulmonary:  Pulmonary Normal                       Hepatic/GI:     GERD, well controlled                Musculoskeletal:  Arthritis                Neurological:  Neurology Normal                                      Endocrine:  Endocrine Normal                Physical Exam  General: Well nourished, Cooperative, Oriented and Alert    Airway:  Mallampati: III   Mouth Opening: Normal  TM Distance: Normal  Tongue: Normal  Neck ROM: Normal ROM    Dental:  Intact        Anesthesia Plan  Type of Anesthesia, risks & benefits discussed:    Anesthesia Type: Gen ETT  Intra-op Monitoring Plan: Standard ASA Monitors  Post Op Pain Control Plan: multimodal analgesia and peripheral nerve block  Induction:  IV  Airway Plan: Direct, Post-Induction  Informed Consent: Informed consent signed with the Patient and all parties understand the risks and agree with anesthesia plan.  All questions answered.   ASA Score: 2    Ready For Surgery From Anesthesia Perspective.     .

## 2025-02-07 NOTE — PLAN OF CARE
Outside Transfer Acceptance Note / Regional Referral Center    Referring facility: [facility]   Referring provider: SANTANA SALDIVAR  Accepting facility: [facility]  Accepting provider: LEESA LUCAS  Admitting provider: ZOFIA  Reason for transfer:  Ortho eval  Transfer diagnosis: Femur fx  Transfer specialty requested: Orthopedic Surgery  Transfer specialty notified: Yes  Transfer level: NUMBER 1-5: 2  Bed type requested: Med-surg  Isolation status: No active isolations   Admission class or status: IP- Inpatient      Narrative      59M DDD, optic neuritis presents to emergency room for left hip pain after fall that occurred just prior to arrival. Patient states that he had a slip and fall on gravel while at work. Landed on his left side. No penetrating trauma. He has had difficulty with lifting his leg and rates pain as 10/10 whenever having to do so. Unable to bear weight. EMS had given patient morphine prior to arrival. Patient denies any weakness, numbness, or tingling to the leg. CBC shows leucocytosis to 14. XR Hip shows mildly displaced and comminuted intertrochanteric fracture of the left proximal femur, with extension to the proximal femoral diametaphysis. No additional fractures are seen. There is overlying soft tissue edema. The remaining osseous structures of the pelvis are intact. Patient to transfer to [facility] for orthopedic evaluation.    Objective     Vitals: Temp: 98.1 °F (36.7 °C) (02/06/25 1622)  Pulse: 89 (02/06/25 1802)  Resp: 18 (02/06/25 1827)  BP: 111/65 (02/06/25 1802)  SpO2: 96 % (02/06/25 1802)  Recent Labs: All pertinent labs within the past 24 hours have been reviewed.  Recent imaging: See above   Airway:     Vent settings:         IV access:        Peripheral IV - Single Lumen 02/06/25 18 G Anterior;Left;Proximal Forearm (Active)   Site Assessment Clean;Dry;Intact 02/06/25 1730   Extremity Assessment Distal to IV No abnormal discoloration;No redness;No  swelling 02/06/25 1730   Dressing Status Clean;Dry;Intact 02/06/25 1730   Dressing Intervention Integrity maintained 02/06/25 1730     Infusions: See above  Allergies:   Review of patient's allergies indicates:   Allergen Reactions    Sulfa (sulfonamide antibiotics)       NPO: Yes    Anticoagulation:   Anticoagulants       None             Instructions      [facility]-  Admit to Hospital Medicine  Upon patient arrival to floor, please send SecureChat to [facility] or call extension [phone] (if no answer, do NOT leave a callback number after the beep, rather please send a SecureChat to [facility]), for Hospital Medicine admit team assignment and for additional admit orders for the patient.  Do not page the attending physician associated with the patient on arrival (this physician may not be on duty at the time of arrival).  Rather, always send a SecureChat to [facility] or call [phone] to reach the triage physician for orders and team assignment.

## 2025-02-07 NOTE — CONSULTS
[facility] - Emergency Dept  Orthopedics  Consult Note    Patient Name: Juarez Keane  MRN: [id number]  Admission Date: (Not on file)  Hospital Length of Stay: 0 days  Attending Provider: Jackie Zamudio MD  Primary Care Provider: Sandrine Olivo DO    Patient information was obtained from patient and ER records.     Consults  Subjective:     Principal Problem:<principal problem not specified>    Chief Complaint:   Chief Complaint   Patient presents with    Transfer     Femur fracture from [place] for Ortho surgery. Pt scheduled for surgery later today.         HPI: Juarez Keane is a 59 y.o. male with PMH significant for GERD and HTN presenting as a transfer for left intertrochanteric fracture. Patient reports he slipped on some rocks, suffering from a ground level fall yesterday 2/6/25 while at work, landing onto his left side. Reports immediate pain and inability to bear weight. Patient denies any head trauma or LOC. The patient denies prior hx of falls. Patient denies numbness and tingling. Denies any other musculoskeletal pain or injuries. No known history of prior hip injury or surgery.  Walks w/out assisted devices at baseline. Doesn't take any anticoagulation at baseline. Last ate at 7pm 2/6/25  They deny IV drug use, they deny  tobacco use, they deny alcohol use, they deny immunosuppressant medications, they deny chemotherapy and  radiation therapy.       Past Medical History:   Diagnosis Date    Degenerative disc disease     GERD (gastroesophageal reflux disease)     HTN (hypertension)     Optic neuritis, right        Past Surgical History:   Procedure Laterality Date    Bilateral knee arthroscopic      HERNIA REPAIR      Left     SPINE SURGERY      cervical    VASECTOMY  7/1990       Review of patient's allergies indicates:   Allergen Reactions    Sulfa (sulfonamide antibiotics)        Current Facility-Administered Medications   Medication    HYDROmorphone injection 1 mg    ketorolac  "injection 9.999 mg    ondansetron injection 4 mg    potassium chloride 10 mEq in 100 mL IVPB     Current Outpatient Medications   Medication Sig    azelastine-fluticasone (DYMISTA) 137-50 mcg/spray Spry nassal spray 1 spray by Each Nostril route 2 (two) times daily.    cromolyn (NASALCHROM) 5.2 mg/spray (4 %) nasal spray 1 spray by Nasal route 4 (four) times daily.    levocetirizine (XYZAL) 5 MG tablet Take 1 tablet (5 mg total) by mouth every evening.     Family History       Problem Relation (Age of Onset)    Arthritis Mother    Cancer Father (68)    Colon cancer Maternal Grandmother    Heart disease Mother    Hypertension Mother    Neuropathy Mother    Throat cancer Father          Tobacco Use    Smoking status: Never    Smokeless tobacco: Never   Substance and Sexual Activity    Alcohol use: Yes     Alcohol/week: 3.0 standard drinks of alcohol     Types: 3 Cans of beer per week     Comment: occasionally    Drug use: No    Sexual activity: Yes     Partners: Female     Comment: vasectomy     ROS  Constitutional: negative for fevers  Eyes: negative visual changes  ENT: negative for hearing loss  Respiratory: negative for dyspnea  Cardiovascular: negative for chest pain  Gastrointestinal: negative for abdominal pain  Genitourinary: negative for dysuria  Neurological: negative for headaches  Behavioral/Psych: negative for hallucinations  Endocrine: negative for temperature intolerance    Objective:     Vital Signs (Most Recent):  Temp: 98.7 °F (37.1 °C) (02/07/25 0448)  Pulse: 89 (02/07/25 0448)  Resp: 18 (02/07/25 0448)  BP: 129/69 (02/07/25 0448)  SpO2: 96 % (02/07/25 0448) Vital Signs (24h Range):  Temp:  [98.1 °F (36.7 °C)-98.7 °F (37.1 °C)] 98.7 °F (37.1 °C)  Pulse:  [81-95] 89  Resp:  [16-20] 18  SpO2:  [95 %-99 %] 96 %  BP: (108-142)/(55-85) 129/69     Weight: 79.4 kg (175 lb)  Height: 5' 9" (175.3 cm)  Body mass index is 25.84 kg/m².    No intake or output data in the 24 hours ending 02/07/25 0743   "   Ortho/SPM Exam  General:  no acute distress, appears stated age   Neuro: alert and oriented x3  Psych: normal mood  Head: normocephalic, atraumatic.  Eyes: no scleral icterus  Mouth: moist mucous membranes  CV: extremities warm and well perfused  Pulm: breathing comfortably, equal chest rise bilat  Skin: clean, dry, intact (any exceptions noted in below musculoskeletal exam)    MSK:    RUE:  - Skin intact throughout, no open wounds  - No swelling  - No ecchymosis, erythema, or signs of cellulitis  - NonTTP throughout  - AROM and PROM of the shoulder, elbow, wrist, and hand intact without pain  - Axillary/AIN/PIN/Radial/Median/Ulnar Nerves assessed in isolation without deficit  - SILT throughout  - Compartments soft  - Radial artery palpated   - Capillary Refill <3s    LUE:  - Skin intact throughout, no open wounds  - No swelling  - No ecchymosis, erythema, or signs of cellulitis  - NonTTP throughout  - AROM and PROM of the shoulder, elbow, wrist, and hand intact without pain  - Axillary/AIN/PIN/Radial/Median/Ulnar Nerves assessed in isolation without deficit  - SILT throughout  - Compartments soft  - Radial artery palpated   - Capillary Refill <3s    RLE:  - Skin intact throughout, no open wounds  - No swelling  - No ecchymosis, erythema, or signs of cellulitis  - NonTTP throughout  - AROM and PROM of the hip, knee, ankle, and foot intact without pain  - TA/EHL/Gastroc/FHL assessed in isolation without deficit  - SILT throughout  - Compartments soft  - DP and PT palpated  - Capillary Refill <3s  - Negative Log roll  - Negative [facility]     LLE:  - Skin intact throughout, no scars present  - No swelling  - No ecchymosis, erythema, or signs of cellulitis  - TTP at hip/proximal femur  - No tenderness to palpation of middle or distal femur  - No tenderness to palpation of proximal, middle, or distal aspects of tibia or fibula  - No tenderness to palpation of foot  - Pain with any ROM at hip  - Full painless ROM of  knee and ankle  - Compartments soft  - SILT Sa/Morfin/DP/SP/T  - Motor intact EHL/FHL/TA/Gastroc  - 2+ DP  - Brisk capillary refill          Significant Labs: All pertinent labs within the past 24 hours have been reviewed.    Significant Imaging: I have reviewed all pertinent imaging results/findings.  Xray of pelvis and left femur show a left intertrochanteric femur fracture, with extension into the subtrochanteric region.  Assessment/Plan:     Closed intertrochanteric fracture of hip, left, initial encounter  Juarez Keane is a 59 y.o. male with PMH of GERD and HTN presenting with a left intertrochanteric femur fracture.  Closed, neurovascularly intact, and without any other musculoskeletal injuries on physical exam.     -Admit to medicine hip fracture service for pre-operative optimization  -Pain: multimodal regimen limiting narcotics   -DVT Ppx: hold chemical, FCD's at all times  -2u prbc prepped and held  -Abx: preop abx ordered  -Bed rest, Rush  -NPO for OR today  -marked and consented for OR today      Informed consent was obtained, patient marked, and case booked   Pre-operative labs:  Lab Results   Component Value Date    HGB 14.7 02/06/2025     02/06/2025    CREATININE 0.9 02/06/2025     (H) 02/06/2025      Lab Results   Component Value Date    INR 1.0 02/06/2025        Extensive discussion was held regarding the severity of the patient's injury and the high mortality rate associated with these fractures (30% at 1 year).  The risks/benefits/alternatives to operative management were explained, with risks including but not limited to: infection, bleeding, pain, stiffness, nerve/vascular injury, heterotopic ossification, leg length discrepancies, rotational deformities, non-union, mal-union, hardware failure, DVT/PE, even death. They express full understanding and wish to proceed with surgery.  No guarantees were implied or stated, and all questions were answered.                           Rama Iglesias MD  Orthopedics  [facility] - Emergency Dept

## 2025-02-07 NOTE — HPI
Juarez Keane is a 59 y.o. male with PMH significant for GERD and HTN presenting as a transfer for left intertrochanteric fracture. Patient reports he slipped on some rocks, suffering from a ground level fall yesterday 2/6/25 while at work, landing onto his left side. Reports immediate pain and inability to bear weight. Patient denies any head trauma or LOC. The patient denies prior hx of falls. Patient denies numbness and tingling. Denies any other musculoskeletal pain or injuries. No known history of prior hip injury or surgery.  Walks w/out assisted devices at baseline. Doesn't take any anticoagulation at baseline. Last ate at 7pm 2/6/25  They deny IV drug use, they deny  tobacco use, they deny alcohol use, they deny immunosuppressant medications, they deny chemotherapy and  radiation therapy.

## 2025-02-07 NOTE — ANESTHESIA PROCEDURE NOTES
Intubation    Date/Time: 2/7/2025 10:41 AM    Performed by: Edith Lara CRNA  Authorized by: Mellisa Hood MD    Intubation:     Induction:  Intravenous    Intubated:  Postinduction    Mask Ventilation:  Easy with oral airway    Attempts:  1    Attempted By:  CRNA    Method of Intubation:  Video laryngoscopy    Blade:  Stoddard 3    Laryngeal View Grade: Grade I - full view of cords      Difficult Airway Encountered?: No      Complications:  None    Airway Device:  Oral endotracheal tube    Airway Device Size:  7.5    Style/Cuff Inflation:  Cuffed (inflated to minimal occlusive pressure)    Tube secured:  22    Secured at:  The lips    Placement Verified By:  Capnometry    Complicating Factors:  None    Findings Post-Intubation:  BS equal bilateral and atraumatic/condition of teeth unchanged

## 2025-02-07 NOTE — SUBJECTIVE & OBJECTIVE
Past Medical History:   Diagnosis Date    Degenerative disc disease     GERD (gastroesophageal reflux disease)     HTN (hypertension)     Optic neuritis, right        Past Surgical History:   Procedure Laterality Date    Bilateral knee arthroscopic      HERNIA REPAIR      Left     SPINE SURGERY      cervical    VASECTOMY  7/1990       Review of patient's allergies indicates:   Allergen Reactions    Sulfa (sulfonamide antibiotics)        Current Facility-Administered Medications   Medication    HYDROmorphone injection 1 mg    ketorolac injection 9.999 mg    ondansetron injection 4 mg    potassium chloride 10 mEq in 100 mL IVPB     Current Outpatient Medications   Medication Sig    azelastine-fluticasone (DYMISTA) 137-50 mcg/spray Spry nassal spray 1 spray by Each Nostril route 2 (two) times daily.    cromolyn (NASALCHROM) 5.2 mg/spray (4 %) nasal spray 1 spray by Nasal route 4 (four) times daily.    levocetirizine (XYZAL) 5 MG tablet Take 1 tablet (5 mg total) by mouth every evening.     Family History       Problem Relation (Age of Onset)    Arthritis Mother    Cancer Father (68)    Colon cancer Maternal Grandmother    Heart disease Mother    Hypertension Mother    Neuropathy Mother    Throat cancer Father          Tobacco Use    Smoking status: Never    Smokeless tobacco: Never   Substance and Sexual Activity    Alcohol use: Yes     Alcohol/week: 3.0 standard drinks of alcohol     Types: 3 Cans of beer per week     Comment: occasionally    Drug use: No    Sexual activity: Yes     Partners: Female     Comment: vasectomy     ROS  Constitutional: negative for fevers  Eyes: negative visual changes  ENT: negative for hearing loss  Respiratory: negative for dyspnea  Cardiovascular: negative for chest pain  Gastrointestinal: negative for abdominal pain  Genitourinary: negative for dysuria  Neurological: negative for headaches  Behavioral/Psych: negative for hallucinations  Endocrine: negative for temperature  "intolerance    Objective:     Vital Signs (Most Recent):  Temp: 98.7 °F (37.1 °C) (02/07/25 0448)  Pulse: 89 (02/07/25 0448)  Resp: 18 (02/07/25 0448)  BP: 129/69 (02/07/25 0448)  SpO2: 96 % (02/07/25 0448) Vital Signs (24h Range):  Temp:  [98.1 °F (36.7 °C)-98.7 °F (37.1 °C)] 98.7 °F (37.1 °C)  Pulse:  [81-95] 89  Resp:  [16-20] 18  SpO2:  [95 %-99 %] 96 %  BP: (108-142)/(55-85) 129/69     Weight: 79.4 kg (175 lb)  Height: 5' 9" (175.3 cm)  Body mass index is 25.84 kg/m².    No intake or output data in the 24 hours ending 02/07/25 0743     Ortho/SPM Exam  General:  no acute distress, appears stated age   Neuro: alert and oriented x3  Psych: normal mood  Head: normocephalic, atraumatic.  Eyes: no scleral icterus  Mouth: moist mucous membranes  CV: extremities warm and well perfused  Pulm: breathing comfortably, equal chest rise bilat  Skin: clean, dry, intact (any exceptions noted in below musculoskeletal exam)    MSK:    RUE:  - Skin intact throughout, no open wounds  - No swelling  - No ecchymosis, erythema, or signs of cellulitis  - NonTTP throughout  - AROM and PROM of the shoulder, elbow, wrist, and hand intact without pain  - Axillary/AIN/PIN/Radial/Median/Ulnar Nerves assessed in isolation without deficit  - SILT throughout  - Compartments soft  - Radial artery palpated   - Capillary Refill <3s    LUE:  - Skin intact throughout, no open wounds  - No swelling  - No ecchymosis, erythema, or signs of cellulitis  - NonTTP throughout  - AROM and PROM of the shoulder, elbow, wrist, and hand intact without pain  - Axillary/AIN/PIN/Radial/Median/Ulnar Nerves assessed in isolation without deficit  - SILT throughout  - Compartments soft  - Radial artery palpated   - Capillary Refill <3s    RLE:  - Skin intact throughout, no open wounds  - No swelling  - No ecchymosis, erythema, or signs of cellulitis  - NonTTP throughout  - AROM and PROM of the hip, knee, ankle, and foot intact without pain  - TA/EHL/Gastroc/FHL " assessed in isolation without deficit  - SILT throughout  - Compartments soft  - DP and PT palpated  - Capillary Refill <3s  - Negative Log roll  - Negative [facility]     LLE:  - Skin intact throughout, no scars present  - No swelling  - No ecchymosis, erythema, or signs of cellulitis  - TTP at hip/proximal femur  - No tenderness to palpation of middle or distal femur  - No tenderness to palpation of proximal, middle, or distal aspects of tibia or fibula  - No tenderness to palpation of foot  - Pain with any ROM at hip  - Full painless ROM of knee and ankle  - Compartments soft  - SILT Sa/Morfin/DP/SP/T  - Motor intact EHL/FHL/TA/Gastroc  - 2+ DP  - Brisk capillary refill          Significant Labs: All pertinent labs within the past 24 hours have been reviewed.    Significant Imaging: I have reviewed all pertinent imaging results/findings.  Xray of pelvis and left femur show a left intertrochanteric femur fracture, with extension into the subtrochanteric region.

## 2025-02-07 NOTE — TRANSFER OF CARE
"Anesthesia Transfer of Care Note    Patient: Juarez Keane    Procedure(s) Performed: Procedure(s) (LRB):  INSERTION, INTRAMEDULLARY ASHLEY, FEMUR (Left)    Patient location: PACU    Anesthesia Type: general    Transport from OR: Transported from OR on 6-10 L/min O2 by face mask with adequate spontaneous ventilation    Post pain: adequate analgesia    Post assessment: no apparent anesthetic complications and tolerated procedure well    Post vital signs: stable    Level of consciousness: sedated and responds to stimulation    Nausea/Vomiting: no nausea/vomiting    Complications: none    Transfer of care protocol was followed      Last vitals: Visit Vitals  /83   Pulse 99   Temp 36.9 °C (98.4 °F) (Temporal)   Resp 17   Ht 5' 9" (1.753 m)   Wt 79.4 kg (175 lb)   SpO2 100%   BMI 25.84 kg/m²     "

## 2025-02-07 NOTE — ASSESSMENT & PLAN NOTE
Patient admitted after slip and fall at work with closed displaced left intertrochanteric fracture. Orthopedic surgery consulted and recommending operative repair of fracture.   Orthopedic surgery consulted and plan to take patient to the OR today, 2/7 for surgical repair of hip fracture so will make patient NPO now for surgery except medications .   Preoperative assessment: Routine labs, CXR and EKG reviewed. Patient is at low risk for perioperative cardiopulmonary complications. Patient with no active cardiac issues. Patient with good functional mets > 4 and with no significant cardiac clinical risk factors (RCRI risk score of 0) for intermediate risk surgery. Recommend to proceed to surgery as planned with no additional non-invasive cardiac testing required.  Pain control as per Hip Fracture Pathway with multimodal pain regimen with scheduled Tylenol, Robaxin.   DVT prophylaxis pre-op with TEDs/SCDs.   Vitamin D level is normal at 30 on admit.   Plan to monitor daily electrolytes and H/H post-op.   Start Apixiban 2.5 mg po BID post-op after surgery for DVT prophylaxis and will need for a total of 30 days after hip fracture surgery.   Will consult PT/OT post-op for gait training and strengthening and restoration of ADLs.   Will consult  and case management to assist with discharge planning for this patient after surgery.    For now non weightbearing to left lower extremity and bedrest pre-op and Orthopedics to update weight bearing status post-op.

## 2025-02-07 NOTE — ASSESSMENT & PLAN NOTE
Potassium low at 2.9 on admit and related to Chlorthalidone use. Will replace with IV Potassium chloride 10 mEq x 2 doses on 2/7. Patient's most recent potassium results are listed below.   Recent Labs     02/06/25  1839   K 2.9*     Plan  - Replete potassium per protocol  - Monitor potassium Daily  - Magnesium normal at 1.9 so no replacement needed.

## 2025-02-07 NOTE — ANESTHESIA PROCEDURE NOTES
L SiFi Catheter    Patient location during procedure: pre-op   Block not for primary anesthetic.  Reason for block: at surgeon's request and post-op pain management   Post-op Pain Location: Left hip and thigh pain   Start time: 2/7/2025 10:11 AM  Timeout: 2/7/2025 10:10 AM   End time: 2/7/2025 10:20 AM    Staffing  Authorizing Provider: Lissette Nelson MD  Performing Provider: Des Yen MD    Staffing  Performed by: Des Yen MD  Authorized by: Lissette Nelson MD    Preanesthetic Checklist  Completed: patient identified, IV checked, site marked, risks and benefits discussed, surgical consent, monitors and equipment checked, pre-op evaluation and timeout performed  Peripheral Block  Patient position: supine  Prep: ChloraPrep and site prepped and draped  Patient monitoring: heart rate, cardiac monitor, continuous pulse ox, continuous capnometry and frequent blood pressure checks  Block type: fascia iliaca  Laterality: left  Injection technique: continuous  Needle  Needle type: Tuohy   Needle gauge: 17 G  Needle length: 3.5 in  Needle localization: anatomical landmarks and ultrasound guidance  Catheter type: spring wound  Catheter size: 19 G  Test dose: lidocaine 1.5% with Epi 1-to-200,000 and negative   -ultrasound image captured on disc.  Assessment  Injection assessment: negative aspiration, negative parasthesia and local visualized surrounding nerve  Paresthesia pain: none  Heart rate change: no  Slow fractionated injection: yes  Pain Tolerance: comfortable throughout block and no complaints  Medications:    Medications: ropivacaine (NAROPIN) injection 0.5% - Perineural   40 mL - 2/7/2025 10:17:00 AM    Additional Notes  VSS.  [facility] RN monitoring vitals throughout procedure.  Patient tolerated procedure well.  40cc 0.25% Ropi with Epi 1:300,000 given

## 2025-02-07 NOTE — TELEPHONE ENCOUNTER
Team, I am sending this authorization for treatment for [employer] [facility] employee, Juarez Keane.  Juarez was treated at [facility] and is currently being transferred to [facility] main campus.     Can I please have this authorization sent in asap to both facilities for billing purposes?  I can be reached at [phone] for any questions.     Thank you.  SERA Christie, CWCP, COSM    [facility].. [address] [place] [phone] (p) [phone]  [email or web address]

## 2025-02-07 NOTE — ED PROVIDER NOTES
Encounter Date: 2/6/2025       History     Chief Complaint   Patient presents with    Transfer     Femur fracture from [place] for Ortho surgery. Pt scheduled for surgery later today.      59-year-old male presents with left hip pain.  Pain is currently a 10/10.  Patient was transferred here for ortho evaluation of the left hip fracture.  Patient was walking on some gravel last night whenever he slipped.  Denies head trauma or LOC. ROS otherwise negative.    The history is provided by the patient and medical records.     Review of patient's allergies indicates:   Allergen Reactions    Sulfa (sulfonamide antibiotics)      Past Medical History:   Diagnosis Date    Degenerative disc disease     GERD (gastroesophageal reflux disease)     HTN (hypertension)     Optic neuritis, right      Past Surgical History:   Procedure Laterality Date    Bilateral knee arthroscopic      HERNIA REPAIR      Left     SPINE SURGERY      cervical    VASECTOMY  7/1990     Family History   Problem Relation Name Age of Onset    Cancer Father Jakob 68    Throat cancer Father Jakob     Hypertension Mother Cathy     Neuropathy Mother Cathy     Arthritis Mother Cathy     Heart disease Mother Cathy     Colon cancer Maternal Grandmother      Heart disease Neg Hx       Social History     Tobacco Use    Smoking status: Never    Smokeless tobacco: Never   Substance Use Topics    Alcohol use: Yes     Alcohol/week: 3.0 standard drinks of alcohol     Types: 3 Cans of beer per week     Comment: occasionally    Drug use: No     Review of Systems    Physical Exam     Initial Vitals [02/07/25 0448]   BP Pulse Resp Temp SpO2   129/69 89 18 98.7 °F (37.1 °C) 96 %      MAP       --         Physical Exam    Constitutional: Vital signs are normal. He appears well-developed and well-nourished.   HENT:   Head: Normocephalic and atraumatic.   Eyes: Conjunctivae are normal.   Cardiovascular:  Normal rate and intact distal pulses.           Musculoskeletal:       Comments: LLE: shortened and externally rotated, distal sensation intact. 2+ DP and PT pulses.      Neurological: He is alert. GCS eye subscore is 4. GCS verbal subscore is 5. GCS motor subscore is 6.   Skin: Skin is warm. Capillary refill takes less than 2 seconds.         ED Course   Procedures  Labs Reviewed   HEPATITIS C ANTIBODY   HIV 1 / 2 ANTIBODY          Imaging Results    None          Medications   HYDROmorphone injection 1 mg (has no administration in time range)   ondansetron injection 4 mg (has no administration in time range)   potassium chloride 10 mEq in 100 mL IVPB (has no administration in time range)   ketorolac injection 9.999 mg (has no administration in time range)     Medical Decision Making  Differential diagnoses considered includes left hip fracture, pelvic fracture, contusion    Discussed the case with Orthopedic surgery who recommends admission to Hospital Medicine.  Discussed the case with Hospital Medicine who admitted the patient to their service for acute left hip fracture needing operative repair.    Reviewed labs from initial ED which showed hypokalemia 2.9.  Patient needs to be NPO for surgery so will replenish IV initially.    Procedure: Critical Care  Please put in 30 minutes of critical care.   Critical care was necessary to treat or prevent imminent or life-threatening deterioration of the following conditions:  acute hip fracture, hypokalemia          Risk  Prescription drug management.  Parenteral controlled substances.  Decision regarding hospitalization.  Emergency major surgery.                                      Clinical Impression:  Final diagnoses:  [S72.002A] Closed fracture of left hip, initial encounter (Primary)  [E87.6] Hypokalemia          ED Disposition Condition    Admit Stable                Jacob Patrick MD  02/07/25 0641

## 2025-02-07 NOTE — H&P
[facility] ([address] [place] [address] Medicine  History & Physical    Patient Name: Juarez Keane  MRN: [id number]  Patient Class: IP- Inpatient  Admission Date: 2/7/2025  Attending Physician: Jackie Zamudio MD  Primary Care Provider: Sandrine Olivo DO         Patient information was obtained from patient, spouse/SO, past medical records, and ER records.     Subjective:     Principal Problem:Closed displaced intertrochanteric fracture of left femur    Chief Complaint:   Chief Complaint   Patient presents with    Transfer     Femur fracture from [place] for Ortho surgery. Pt scheduled for surgery later today.         HPI: 59 y.o.male with essential hypertension, GERD, history of optic neuritis to right eye presented to the [facility] ER as transfer from [facility] ED with complaint of left hip pain. Patient states pain is sharp/stabbing in the left groin, upper leg and is aggravated by any weight bearing. He reports onset of symptoms was 1 day(s) after a fall after ambulating at work. Patient reports that he was at work yesterday and was walking up a gravel ramp and slipped in the gravel and fell on his left hip and upper leg area. After fall patient was able to sit up but could not stand up or bear weight to his left leg due to pain that he described as 10/10 pain to left groin and upper thigh area. Patient was transported to [facility] ED and he had X-rays done that revealed a closed displaced left intertrochanteric fracture. Ortho at [place] consulted and recommended transfer to [facility] for Ortho intervention at [facility] so patient was transferred from ED to ED early this am. Patient currently rates his pain as 4/10 to left hip but just received IV Toradol and IV Dilaudid. Wife at bedside in ED. Ortho evaluated patient in ED and planning on taking to OR this am to repair left hip fracture. Patient denies head trauma. Patient denies to loss of consciousness, denies syncope, denies chest pain,  denies dizziness prior or after fall. X-ray obtained in ER revealed left intertrochanteric hip fracture.   Prior to admission patient's functional mobility was independent with no assistive device for home and community distances. Patient does not have history of gait or balance problems. Patient does not have history of previous falls or fractures. Patient does not have previous cardiac history such as MI or CAD. Patient does not have previous history of TIA or stroke. Patient does not have previous history of compensated heart failure. Patient does not have history of diabetes. Patient does not have history of advanced kidney disease with creatinine > 2. Patient currently lives with their spouse.  Labs reviewed. Potassium low at 2.9 and likely related to diuretic he takes for his HTN. Patient ordered to receive Potassium rider 10 mEq x 2 in ED to replace. Hgb 14.7. Creatinine normal at 0.9. LFT's normal. HgA1C 5.8% and mildly elevated. Vitamin D normal at 30. Magnesium normal at 1.9. INR normal at 1.0. EKG reviewed my myself and normal sinus rhythm with no ischemic changes. CXR reviewed by myself and normal with no infiltrates or masses or edema.   Patient reports only prescription medication he is currently taking is Chlorthalidone 25 mg po daily that he was recently prescribed for his HTN.       Past Medical History:   Diagnosis Date    Degenerative disc disease, cervical     Essential hypertension 07/21/2022    GERD (gastroesophageal reflux disease)     Optic neuritis, right     S/P cervical spinal fusion 11/02/2017       Past Surgical History:   Procedure Laterality Date    Bilateral knee arthroscopic      HERNIA REPAIR      Left     SPINE SURGERY      Cervical Fusion C5,C6    VASECTOMY  07/1990       Review of patient's allergies indicates:   Allergen Reactions    Sulfa (sulfonamide antibiotics)        Current Facility-Administered Medications on File Prior to Encounter   Medication    [COMPLETED] 0.9% NaCl  infusion    [COMPLETED] HYDROmorphone injection 0.5 mg    [COMPLETED] HYDROmorphone injection 0.5 mg    [COMPLETED] ketorolac injection 15 mg     Current Outpatient Medications on File Prior to Encounter   Medication Sig    chlorthalidone (HYGROTEN) 25 MG Tab Take 25 mg by mouth once daily.    cromolyn (NASALCHROM) 5.2 mg/spray (4 %) nasal spray 1 spray by Nasal route 4 (four) times daily.    levocetirizine (XYZAL) 5 MG tablet Take 1 tablet (5 mg total) by mouth every evening.    [DISCONTINUED] azelastine-fluticasone (DYMISTA) 137-50 mcg/spray Spry nassal spray 1 spray by Each Nostril route 2 (two) times daily.     Family History       Problem Relation (Age of Onset)    Arthritis Mother    Cancer Father (68)    Colon cancer Maternal Grandmother    Heart disease Mother    Hypertension Mother    Neuropathy Mother    Throat cancer Father          Tobacco Use    Smoking status: Never    Smokeless tobacco: Never   Substance and Sexual Activity    Alcohol use: Yes     Alcohol/week: 3.0 standard drinks of alcohol     Types: 3 Cans of beer per week     Comment: occasionally    Drug use: No    Sexual activity: Yes     Partners: Female     Comment: vasectomy     Review of Systems   Constitutional:  Negative for chills and fever.   HENT:  Negative for congestion and sore throat.    Eyes:  Positive for visual disturbance (Low vision right eye). Negative for pain.   Respiratory:  Negative for cough and shortness of breath.    Cardiovascular:  Negative for chest pain, palpitations and leg swelling.   Gastrointestinal:  Negative for abdominal pain, nausea and vomiting.   Endocrine: Negative for polydipsia and polyuria.   Genitourinary:  Negative for dysuria and hematuria.   Musculoskeletal:  Positive for arthralgias (Right hip) and myalgias (Right thigh).   Skin:  Negative for rash and wound.   Allergic/Immunologic: Negative for immunocompromised state.   Neurological:  Negative for dizziness and light-headedness.   Hematological:   Negative for adenopathy. Does not bruise/bleed easily.   Psychiatric/Behavioral:  Negative for agitation and confusion.      Objective:     Vital Signs (Most Recent):  Temp: 98.4 °F (36.9 °C) (02/07/25 0919)  Pulse: 81 (02/07/25 1015)  Resp: 19 (02/07/25 1015)  BP: 106/57 (02/07/25 1020)  SpO2: 98 % (02/07/25 1015) on room air Vital Signs (24h Range):  Temp:  [98.1 °F (36.7 °C)-98.7 °F (37.1 °C)] 98.4 °F (36.9 °C)  Pulse:  [81-95] 81  Resp:  [15-20] 19  SpO2:  [95 %-100 %] 98 %  BP: (106-142)/(55-85) 106/57     Weight: 79.4 kg (175 lb)  Body mass index is 25.84 kg/m².     Physical Exam  Vitals reviewed.   Constitutional:       General: He is awake. He is not in acute distress.     Appearance: Normal appearance. He is well-developed and normal weight. He is not ill-appearing.      Comments: Patient sitting up in stretcher in ED with his wife at bedside. Patient awake and alert and oriented x 4. Patient very healthy appearing and in no distress.    HENT:      Head: Normocephalic and atraumatic.      Nose: Nose normal.      Mouth/Throat:      Mouth: Mucous membranes are moist.      Pharynx: No oropharyngeal exudate.   Eyes:      General: No scleral icterus.     Conjunctiva/sclera: Conjunctivae normal.   Neck:      Vascular: No JVD.   Cardiovascular:      Rate and Rhythm: Normal rate and regular rhythm.      Pulses:           Dorsalis pedis pulses are 2+ on the right side and 2+ on the left side.        Posterior tibial pulses are 2+ on the right side and 2+ on the left side.      Heart sounds: Normal heart sounds. No murmur heard.  Pulmonary:      Effort: Pulmonary effort is normal. No respiratory distress.      Breath sounds: Normal breath sounds. No wheezing or rales.   Abdominal:      General: Abdomen is flat. Bowel sounds are normal. There is no distension.      Palpations: Abdomen is soft.      Tenderness: There is no abdominal tenderness. There is no guarding.   Musculoskeletal:         General: Deformity (Left  leg shortened and externally rotated) present.      Cervical back: Neck supple.      Right lower leg: No edema.      Left lower leg: No edema.   Skin:     General: Skin is warm.      Capillary Refill: Capillary refill takes less than 2 seconds.      Findings: No erythema.   Neurological:      Mental Status: He is alert and oriented to person, place, and time.   Psychiatric:         Mood and Affect: Mood normal.         Behavior: Behavior normal. Behavior is cooperative.         Thought Content: Thought content normal.         Judgment: Judgment normal.                Significant Labs: A1C:   Recent Labs   Lab 11/15/24  0603 02/06/25  1839   HGBA1C 5.5 5.8*     CBC:   Recent Labs   Lab 02/06/25 1839   WBC 14.20*   HGB 14.7   HCT 41.4        CMP:   Recent Labs   Lab 02/06/25 1839   *   K 2.9*   CL 95   CO2 28   *   BUN 14   CREATININE 0.9   CALCIUM 8.4*   PROT 6.3   ALBUMIN 3.8   BILITOT 0.4   ALKPHOS 81   AST 19   ALT 18   ANIONGAP 11       Coagulation:   Recent Labs   Lab 02/06/25 1839 02/06/25  1848   INR 1.0  --    APTT  --  23.4     Magnesium:   Recent Labs   Lab 02/06/25  1848   MG 1.9     Lab Results   Component Value Date    VFWQAJHQ26IM 30 02/06/2025     Significant Imaging: I have reviewed all pertinent imaging results/findings within the past 24 hours.  Assessment/Plan:     * Closed displaced intertrochanteric fracture of left femur  Patient admitted after slip and fall at work with closed displaced left intertrochanteric fracture. Orthopedic surgery consulted and recommending operative repair of fracture.   Orthopedic surgery consulted and plan to take patient to the OR today, 2/7 for surgical repair of hip fracture so will make patient NPO now for surgery except medications .   Preoperative assessment: Routine labs, CXR and EKG reviewed. Patient is at low risk for perioperative cardiopulmonary complications. Patient with no active cardiac issues. Patient with good functional mets >  4 and with no significant cardiac clinical risk factors (RCRI risk score of 0) for intermediate risk surgery. Recommend to proceed to surgery as planned with no additional non-invasive cardiac testing required.  Pain control as per Hip Fracture Pathway with multimodal pain regimen with scheduled Tylenol, Robaxin.   DVT prophylaxis pre-op with TEDs/SCDs.   Vitamin D level is normal at 30 on admit.   Plan to monitor daily electrolytes and H/H post-op.   Start Apixiban 2.5 mg po BID post-op after surgery for DVT prophylaxis and will need for a total of 30 days after hip fracture surgery.   Will consult PT/OT post-op for gait training and strengthening and restoration of ADLs.   Will consult  and case management to assist with discharge planning for this patient after surgery.    For now non weightbearing to left lower extremity and bedrest pre-op and Orthopedics to update weight bearing status post-op.    Essential hypertension  Patient's blood pressure range in the last 24 hours was: BP  Min: 106/57  Max: 142/78.The patient's inpatient anti-hypertensive regimen is listed below:  Current Antihypertensives  , Daily, Oral  chlorthalidone tablet 25 mg, Daily, Oral    Plan  - BP is controlled, no changes needed to their regimen. Resume home medication of Chlorthalidone on 2/8. Holding dose for today, 2/7 as going to OR.   - Goal BP < 140/90.     Hypokalemia  Potassium low at 2.9 on admit and related to Chlorthalidone use. Will replace with IV Potassium chloride 10 mEq x 2 doses on 2/7. Patient's most recent potassium results are listed below.   Recent Labs     02/06/25  1839   K 2.9*     Plan  - Replete potassium per protocol  - Monitor potassium Daily  - Magnesium normal at 1.9 so no replacement needed.     Prediabetes  Patient with screening HgA1C of 5.8% on admit consistent with prediabetes. This is a new diagnosis. Will refer back to his primary care physician on discharge for further management. No  glucose monitoring needed in hospital.         VTE Risk Mitigation (From admission, onward)           Ordered     IP VTE HIGH RISK PATIENT  Once         02/07/25 0852     Place sequential compression device  Until discontinued         02/07/25 0852     Place ANNMARIE hose  Until discontinued         02/07/25 0852                                    Jackie Zamudio MD  Department of Hospital Medicine  [facility] - Surgery ([facility])

## 2025-02-07 NOTE — NURSING TRANSFER
Nursing Transfer Note      2/7/2025   1:26 PM    Nurse giving handoff: Reggie [facility] PACU  Nurse receiving handoff: Km [facility]BE POST OP    Reason patient is being transferred: post surgery/anesthesia    Transfer To: 502    Transfer via bed    Transfer with cardiac monitoring, CADD pump    Transported by PCT x2    Transfer Vital Signs:  Please see flow sheet    Telemetry: Box Number [id number], Rate 86, Rhythm sinus , and Telemetry  Barrera  Order for Tele Monitor? Yes    Additional Lines: none    Medicines sent: ropivacaine via pump    Any special needs or follow-up needed: routine    Patient belongings transferred with patient: No    Chart send with patient: Yes    Notified: spouse    Patient reassessed at: 2/7/2025 at 1330  1  Upon arrival to floor: cardiac monitor applied, patient oriented to room, and bed in lowest position

## 2025-02-07 NOTE — ASSESSMENT & PLAN NOTE
Patient with screening HgA1C of 5.8% on admit consistent with prediabetes. This is a new diagnosis. Will refer back to his primary care physician on discharge for further management. No glucose monitoring needed in hospital.

## 2025-02-08 LAB
BASOPHILS # BLD AUTO: 0.01 K/UL (ref 0–0.2)
BASOPHILS NFR BLD: 0.1 % (ref 0–1.9)
DIFFERENTIAL METHOD BLD: ABNORMAL
EOSINOPHIL # BLD AUTO: 0.1 K/UL (ref 0–0.5)
EOSINOPHIL NFR BLD: 0.6 % (ref 0–8)
ERYTHROCYTE [DISTWIDTH] IN BLOOD BY AUTOMATED COUNT: 12.7 % (ref 11.5–14.5)
HCT VFR BLD AUTO: 35.8 % (ref 40–54)
HGB BLD-MCNC: 12.1 G/DL (ref 14–18)
IMM GRANULOCYTES # BLD AUTO: 0.04 K/UL (ref 0–0.04)
IMM GRANULOCYTES NFR BLD AUTO: 0.4 % (ref 0–0.5)
LYMPHOCYTES # BLD AUTO: 1.4 K/UL (ref 1–4.8)
LYMPHOCYTES NFR BLD: 13.2 % (ref 18–48)
MCH RBC QN AUTO: 29.6 PG (ref 27–31)
MCHC RBC AUTO-ENTMCNC: 33.8 G/DL (ref 32–36)
MCV RBC AUTO: 88 FL (ref 82–98)
MONOCYTES # BLD AUTO: 1.2 K/UL (ref 0.3–1)
MONOCYTES NFR BLD: 11.2 % (ref 4–15)
NEUTROPHILS # BLD AUTO: 8 K/UL (ref 1.8–7.7)
NEUTROPHILS NFR BLD: 74.5 % (ref 38–73)
NRBC BLD-RTO: 0 /100 WBC
PLATELET # BLD AUTO: 171 K/UL (ref 150–450)
PMV BLD AUTO: 9.6 FL (ref 9.2–12.9)
RBC # BLD AUTO: 4.09 M/UL (ref 4.6–6.2)
WBC # BLD AUTO: 10.66 K/UL (ref 3.9–12.7)

## 2025-02-08 PROCEDURE — 85025 COMPLETE CBC W/AUTO DIFF WBC: CPT

## 2025-02-08 PROCEDURE — 97535 SELF CARE MNGMENT TRAINING: CPT

## 2025-02-08 PROCEDURE — 97162 PT EVAL MOD COMPLEX 30 MIN: CPT

## 2025-02-08 PROCEDURE — 21400001 HC TELEMETRY ROOM

## 2025-02-08 PROCEDURE — 25000003 PHARM REV CODE 250: Performed by: INTERNAL MEDICINE

## 2025-02-08 PROCEDURE — 97116 GAIT TRAINING THERAPY: CPT

## 2025-02-08 PROCEDURE — 25000003 PHARM REV CODE 250

## 2025-02-08 PROCEDURE — 25000003 PHARM REV CODE 250: Performed by: STUDENT IN AN ORGANIZED HEALTH CARE EDUCATION/TRAINING PROGRAM

## 2025-02-08 PROCEDURE — 97165 OT EVAL LOW COMPLEX 30 MIN: CPT

## 2025-02-08 PROCEDURE — 99231 SBSQ HOSP IP/OBS SF/LOW 25: CPT | Mod: ,,, | Performed by: STUDENT IN AN ORGANIZED HEALTH CARE EDUCATION/TRAINING PROGRAM

## 2025-02-08 PROCEDURE — 11000001 HC ACUTE MED/SURG PRIVATE ROOM

## 2025-02-08 PROCEDURE — 97530 THERAPEUTIC ACTIVITIES: CPT

## 2025-02-08 PROCEDURE — 36415 COLL VENOUS BLD VENIPUNCTURE: CPT

## 2025-02-08 RX ORDER — TALC
6 POWDER (GRAM) TOPICAL NIGHTLY PRN
Status: DISCONTINUED | OUTPATIENT
Start: 2025-02-08 | End: 2025-02-09 | Stop reason: HOSPADM

## 2025-02-08 RX ORDER — POTASSIUM CHLORIDE 20 MEQ/1
40 TABLET, EXTENDED RELEASE ORAL ONCE
Status: COMPLETED | OUTPATIENT
Start: 2025-02-08 | End: 2025-02-08

## 2025-02-08 RX ADMIN — POLYETHYLENE GLYCOL 3350 17 G: 17 POWDER, FOR SOLUTION ORAL at 08:02

## 2025-02-08 RX ADMIN — METHOCARBAMOL 500 MG: 500 TABLET ORAL at 05:02

## 2025-02-08 RX ADMIN — ACETAMINOPHEN 1000 MG: 500 TABLET ORAL at 12:02

## 2025-02-08 RX ADMIN — APIXABAN 2.5 MG: 2.5 TABLET, FILM COATED ORAL at 08:02

## 2025-02-08 RX ADMIN — SENNOSIDES AND DOCUSATE SODIUM 1 TABLET: 50; 8.6 TABLET ORAL at 09:02

## 2025-02-08 RX ADMIN — APIXABAN 2.5 MG: 2.5 TABLET, FILM COATED ORAL at 09:02

## 2025-02-08 RX ADMIN — MUPIROCIN 1 G: 20 OINTMENT TOPICAL at 08:02

## 2025-02-08 RX ADMIN — ACETAMINOPHEN 1000 MG: 500 TABLET ORAL at 05:02

## 2025-02-08 RX ADMIN — MUPIROCIN 1 G: 20 OINTMENT TOPICAL at 09:02

## 2025-02-08 RX ADMIN — METHOCARBAMOL 500 MG: 500 TABLET ORAL at 12:02

## 2025-02-08 RX ADMIN — ACETAMINOPHEN 1000 MG: 500 TABLET ORAL at 06:02

## 2025-02-08 RX ADMIN — CHLORTHALIDONE 25 MG: 25 TABLET ORAL at 08:02

## 2025-02-08 RX ADMIN — POTASSIUM CHLORIDE 40 MEQ: 1500 TABLET, EXTENDED RELEASE ORAL at 10:02

## 2025-02-08 RX ADMIN — CELECOXIB 200 MG: 200 CAPSULE ORAL at 08:02

## 2025-02-08 RX ADMIN — SENNOSIDES AND DOCUSATE SODIUM 1 TABLET: 50; 8.6 TABLET ORAL at 08:02

## 2025-02-08 RX ADMIN — METHOCARBAMOL 500 MG: 500 TABLET ORAL at 06:02

## 2025-02-08 NOTE — PLAN OF CARE
OT eval complete. OT POC and goals established.   Problem: Occupational Therapy  Goal: Occupational Therapy Goal  Description: Goals to be met by: 3/10/25     Patient will increase functional independence with ADLs by performing:    LE Dressing with Modified Rodman.  Grooming while standing at sink with Modified Rodman.  Toileting from toilet/bedside commode with Modified Rodman for hygiene and clothing management.   Supine to sit with Modified Rodman.  Toilet transfer to toilet/bedside commode with Modified Rodman and LRAD.    Outcome: Progressing

## 2025-02-08 NOTE — SUBJECTIVE & OBJECTIVE
Interval History: Patient admitted yesterday for closed left intertrochanteric femur fracture. Ortho consulted and patient taken to OR yesterday and underwent left femur cephalomedullary nail fixation by Dr. Willie Taylor. Post-op, patient is weight bear as tolerated and range of motion as tolerated to the left lower extremity as per Orthopedics recommendation. Patient placed on Apixiban 2.5 mg po BID and ANNMARIE/SCD's for DVT prophylaxis post-op and will need for total of 30 days. Perineural pain catheter placed by Anesthesia Pain Service with continuous infusion of Ropivacaine to help with pain control post-op and Anesthesia Pain Service managing while patient in the hospital. Patient placed on multimodal pain management post-op with Tylenol 1000 mg po every 6 hours, Robaxin 500 mg po every 6 hours, and Celebrex 200 mg po daily post-op and will continue. Patient rates pain as 3/10 to left hip and thigh area when moving or ambulating but no pain at rest. PT/OT consulted post-op and worked with patient this am. Labs reviewed. Hgb stable at 12.1.    Review of Systems   Constitutional:  Negative for fever.   Respiratory:  Negative for cough and shortness of breath.    Cardiovascular:  Negative for chest pain.   Gastrointestinal:  Negative for abdominal pain and nausea.   Musculoskeletal:  Positive for arthralgias (Left hip) and myalgias (Left thigh).   Neurological:  Negative for dizziness.   Psychiatric/Behavioral:  Negative for agitation and confusion.      Objective:     Vital Signs (Most Recent):  Temp: 97.9 °F (36.6 °C) (02/08/25 1126)  Pulse: 91 (02/08/25 1126)  Resp: 16 (02/08/25 1126)  BP: 119/56 (02/08/25 1126)  SpO2: 98 % (02/08/25 1126) on room air Vital Signs (24h Range):  Temp:  [97.6 °F (36.4 °C)-99.3 °F (37.4 °C)] 97.9 °F (36.6 °C)  Pulse:  [67-93] 91  Resp:  [16-21] 16  SpO2:  [95 %-99 %] 98 %  BP: (105-130)/(56-74) 119/56     Weight: 79.4 kg (175 lb)  Body mass index is 25.77  kg/m².    Intake/Output Summary (Last 24 hours) at 2/8/2025 1340  Last data filed at 2/8/2025 1249  Gross per 24 hour   Intake 900 ml   Output --   Net 900 ml         Physical Exam  Vitals and nursing note reviewed.   Constitutional:       General: He is awake. He is not in acute distress.     Appearance: Normal appearance. He is well-developed and normal weight. He is not ill-appearing.      Comments: Patient sitting up in bedside chair in no distress and family at bedside. Patient very comfortable appearing on exam.    Eyes:      Conjunctiva/sclera: Conjunctivae normal.   Cardiovascular:      Rate and Rhythm: Normal rate and regular rhythm.      Heart sounds: Normal heart sounds. No murmur heard.  Pulmonary:      Effort: Pulmonary effort is normal. No respiratory distress.      Breath sounds: Normal breath sounds. No wheezing or rales.   Abdominal:      General: Abdomen is flat. Bowel sounds are normal. There is no distension.      Palpations: Abdomen is soft.      Tenderness: There is no abdominal tenderness. There is no guarding.   Musculoskeletal:      Right lower leg: No edema.      Left lower leg: No edema.   Skin:     General: Skin is warm.      Findings: No erythema.      Comments: Surgical dressings noted on left hip and thigh area. Dressings are clean and dry and intact.   Neurological:      Mental Status: He is alert and oriented to person, place, and time.   Psychiatric:         Mood and Affect: Mood normal.         Behavior: Behavior normal. Behavior is cooperative.         Thought Content: Thought content normal.         Judgment: Judgment normal.             Significant Labs: BMP:   Recent Labs   Lab 02/06/25  1839 02/06/25  1848   *  --    *  --    K 2.9*  --    CL 95  --    CO2 28  --    BUN 14  --    CREATININE 0.9  --    CALCIUM 8.4*  --    MG  --  1.9     CBC:   Recent Labs   Lab 02/06/25  1839 02/08/25  0649   WBC 14.20* 10.66   HGB 14.7 12.1*   HCT 41.4 35.8*    171        Significant Imaging: I have reviewed all pertinent imaging results/findings within the past 24 hours.

## 2025-02-08 NOTE — SUBJECTIVE & OBJECTIVE
"Principal Problem:Closed displaced intertrochanteric fracture of left femur    Principal Orthopedic Problem: s/p L IT IMN 2/7     Interval History: Patient seen and examined at bedside. NAEON. Patient doing well. Patient remained HDS, afebrile overnight . No complaints. Pain well controlled. Anticipate working with PT today.       Review of patient's allergies indicates:   Allergen Reactions    Sulfa (sulfonamide antibiotics)        Current Facility-Administered Medications   Medication    0.9%  NaCl infusion (for blood administration)    acetaminophen tablet 1,000 mg    apixaban tablet 2.5 mg    bisacodyL suppository 10 mg    celecoxib capsule 200 mg    chlorthalidone tablet 25 mg    methocarbamoL tablet 500 mg    mupirocin 2 % ointment 1 g    ondansetron injection 4 mg    polyethylene glycol packet 17 g    potassium chloride 10 mEq in 100 mL IVPB    potassium chloride 10 mEq in 100 mL IVPB    prochlorperazine injection Soln 5 mg    ropivacaine 0.2% Perineural Pump infusion 500 ML    senna-docusate 8.6-50 mg per tablet 1 tablet     Objective:     Vital Signs (Most Recent):  Temp: 97.6 °F (36.4 °C) (02/08/25 0510)  Pulse: 75 (02/08/25 0510)  Resp: 16 (02/08/25 0510)  BP: (!) 112/56 (02/08/25 0510)  SpO2: 95 % (02/08/25 0510) Vital Signs (24h Range):  Temp:  [97.6 °F (36.4 °C)-99.3 °F (37.4 °C)] 97.6 °F (36.4 °C)  Pulse:  [67-99] 75  Resp:  [15-21] 16  SpO2:  [95 %-100 %] 95 %  BP: (105-141)/(56-91) 112/56     Weight: 79.4 kg (175 lb)  Height: 5' 9.09" (175.5 cm)  Body mass index is 25.77 kg/m².      Intake/Output Summary (Last 24 hours) at 2/8/2025 0635  Last data filed at 2/7/2025 1238  Gross per 24 hour   Intake 1100 ml   Output 550 ml   Net 550 ml        Ortho/SPM Exam   AAOx4  NAD  Reg rate  No increased WOB    LLE:  Dressing c/d/i  SILT T/SP/DP/Morfin/Sa  Motor intact T/SP/DP  WWP extremities  FCDs in place and functioning   Significant Labs: All pertinent labs within the past 24 hours have been " reviewed.    Significant Imaging: I have reviewed all pertinent imaging results/findings.

## 2025-02-08 NOTE — PT/OT/SLP EVAL
"Physical Therapy   Co-Evaluation    Patient Name:  Juarez Keane   MRN:  [id number]    Recommendations:     Discharge Recommendations: Low Intensity Therapy   Discharge Equipment Recommendations: walker, rolling, bedside commode   Barriers to discharge: None    Assessment:     Juarez Keane is a 59 y.o. male admitted with a medical diagnosis of Closed displaced intertrochanteric fracture of left femur.  He presents with the following impairments/functional limitations: weakness, impaired endurance, impaired functional mobility, gait instability, impaired balance, decreased lower extremity function, pain, orthopedic precautions Patient pleasantly agreeable to therapy eval this date, motivated to participate and tolerated the session well. Able to ambulate into hallway and stand at sink for ADLs. Patient will continue to benefit from skilled PT during this admit to address BLE strength and endurance deficits, and maximize independence with functional mobility.    Rehab Prognosis: Good; patient would benefit from acute skilled PT services to address these deficits and reach maximum level of function.    Recent Surgery: Procedure(s) (LRB):  INSERTION, INTRAMEDULLARY ASHLEY, FEMUR (Left) 1 Day Post-Op    Plan:     During this hospitalization, patient to be seen  (daily (2/8 - 2/10), then 4x/week) to address the identified rehab impairments via gait training, therapeutic activities, therapeutic exercises, neuromuscular re-education and progress toward the following goals:    Plan of Care Expires:  03/10/25    Subjective     Chief Complaint: "I was supposed to be out hunting this weekend"  Patient/Family Comments/goals: return home to [facility]  Pain/Comfort:  Pain Rating 1:  ("a little")  Location - Side 1: Left  Location - Orientation 1: generalized  Location 1: leg  Pain Addressed 1: Pre-medicate for activity, Reposition, Distraction  Pain Rating Post-Intervention 1:  (no pain rating stated)    Patients cultural, " spiritual, Tenriism conflicts given the current situation: no    Living Environment:  Patient lives with wife in a H with 1 DEBBIE.  Bathroom contains a tub-shower combo with a bath bench inside.  Prior to admission, patients level of function was independence - actively works as  and drives.  Equipment used at home: bath bench.  DME owned (not currently used): none.  Upon discharge, patient will have assistance from wife.    Objective:     Communicated with RN prior to session.  Patient found HOB elevated with telemetry, perineural catheter, FCD  upon PT entry to room.    General Precautions: Standard, fall  Orthopedic Precautions:LLE weight bearing as tolerated (LLE ROMAT)   Braces: N/A  Respiratory Status: Room air    Exams:  Cognitive Exam:  Patient is oriented to Person, Place, Time, and Situation  Gross Motor Coordination:  WFL  Postural Exam:  Patient presented with the following abnormalities:    -       Rounded shoulders  -       Forward head  RLE ROM: WFL  RLE Strength: WFL  LLE ROM: painful, so limited hip flex and knee ext  LLE Strength: hip and knee flex 2+/5, ankle PF/DF 3+/5    Functional Mobility:  Bed Mobility:     Supine to Sit: minimum assistance and HOB elevated  Transfers:     Sit to Stand:  stand by assistance with rolling walker  Toilet Transfer: contact guard assistance with  rolling walker  using  Step Transfer  Gait: 46 ft with RW and CGA, chair follow and cues for sequencing of feet/AD, antalgic gait pattern noted with mildly FFP, one standing rest break for decreased endurance  Balance: sitting: good, standing: good      AM-PAC 6 CLICK MOBILITY  Total Score:17       Treatment & Education:  Patient educated on calling for assistance for any needs to improve overall safety awareness.  Patient educated on role of PT in acute care, PT POC, and PT goals.  Patient required co-evaluation with OT for highest quality care d/t decreased activity tolerance and increased level of  assistance necessary.  All items placed within reach, and notified on call light usage for assistance with any needs for fall prevention.  Patient educated on importance of OOB activity to promote overall endurance.  Increased time for toileting (needed to have BM)  Education: hand/foot placement for transfers with safety, LLE precautions, appropriate DME usage  RW left in room, educated to only use with staff    Patient left up in chair with all lines intact, call button in reach, and team present.    GOALS:   Multidisciplinary Problems       Physical Therapy Goals          Problem: Physical Therapy    Goal Priority Disciplines Outcome Interventions   Physical Therapy Goal     PT, PT/OT Progressing    Description: Goals to be met by: 25     Patient will increase functional independence with mobility by performin. Supine to sit with Modified [place]  2. Sit to supine with Modified [place]  3. Sit to stand transfer with Modified [place]  4. Bed to chair transfer with Modified [place] using Rolling Walker  5. Gait  x 150 feet with Modified [place] using Rolling Walker.   6. Ascend/Descend 6 inch curb step with Modified [place] using Rolling Walker.  7. Lower extremity exercise program x15 reps per handout, with assistance as needed                         DME Justifications:   Juarez Lo requires a commode for home use because he is confined to a single room.   Juarez Lo's mobility limitation cannot be sufficiently resolved by the use of a cane. His functional mobility deficit can be sufficiently resolved with the use of a Rolling Walker. Patient's mobility limitation significantly impairs their ability to participate in one of more activities of daily living.  The use of a RW will significantly improve the patient's ability to participate in MRADLS and the patient will use it on regular basis in the home.    History:     Past Medical History:   Diagnosis Date     Degenerative disc disease, cervical     Essential hypertension 07/21/2022    GERD (gastroesophageal reflux disease)     Optic neuritis, right     S/P cervical spinal fusion 11/02/2017       Past Surgical History:   Procedure Laterality Date    Bilateral knee arthroscopic      HERNIA REPAIR      Left     SPINE SURGERY      Cervical Fusion C5,C6    VASECTOMY  07/1990       Time Tracking:     PT Received On: 02/08/25  PT Start Time: 0836     PT Stop Time: 0907  PT Total Time (min): 31 min     Billable Minutes: Evaluation 8, Gait Training 15, and Therapeutic Activity 8      02/08/2025

## 2025-02-08 NOTE — HOSPITAL COURSE
Patient admitted to [facility] Medicine Team H: Hip Fracture team and started on Hip Fracture Pathway with Orthopedic surgery consult for closed left intertrochanteric femur fracture. Patient was seen and evaluated by Orthopedic surgery who recommended operative repair of hip fracture. Patient was medically optimized prior to surgery and was taken to OR after optimization on 2/7/2025. Patient underwent left femur cephalomedullary nail fixation by Dr. Willie Taylor. Post-op patient weight bear as tolerated and range of motion as tolerated to the left lower extremity as per Orthopedics recommendation. Patient placed on Apixiban 2.5 mg po BID and ANNMARIE/SCD's for DVT prophylaxis post-op and will need for total of 30 days. Perineural pain catheter placed by Anesthesia Pain Service with continuous infusion of Ropivacaine to help with pain control post-op and Anesthesia Pain Service managing while patient in the hospital. Patient placed on multimodal pain management post-op with Tylenol 1000 mg po every 6 hours, Robaxin 500 mg po every 6 hours, and Celebrex 200 mg po daily post-op and will continue. PT/OT consulted post-op. X-ray AP pelvis and left femur AP lateral views at subsequent follow-ups: Follow-up postop 2 weeks, 6 weeks, 3 months, 6 months, 1 year. Patient progressing very well with PT/OT in hospital and recommended low intensity on discharge and plan HH PT/OT and rolling walker on discharge. Pain well controlled to left hip post-op. On day of discharge on 2/9, patient ambulated ~120 feet with rolling walker and stand by assist. Anesthesia discussed with patient and wife about perineural catheter and patient to discharge with perineural catheter in place and patient and instructed by Anesthesia on removal of catheter. Patient's Hgb stable post-op at 11.4 on day of discharge. Hypokalemia resolved on discharge. Vitals signs stable and controlled on day of discharge. Patient and wife instructed not to remove  surgical bandages and they are to remain in place until Orthopedic clinic follow-up. Patient discharged home in good condition with HH PT./OT and rolling walker on 2/9.

## 2025-02-08 NOTE — ANESTHESIA POST-OP PAIN MANAGEMENT
Acute Pain Service Progress Note    Juarez Keane is a 59 y.o., male, [id number].    Surgery:  INSERTION, INTRAMEDULLARY ASHLEY, FEMUR (Left: Leg Upper)     Post Op Day #: 1    Catheter type: perineural  SIFI    Infusion type: Ropivacaine 0.2%  15cc/3h basal    Problem List:    Active Hospital Problems    Diagnosis  POA    *Closed displaced intertrochanteric fracture of left femur s/p IM nail on 2/7/2025 [S72.142A]  Yes    Hypokalemia [E87.6]  Yes    Prediabetes [R73.03]  Yes    Essential hypertension [I10]  Yes     Amlodipine 5 and losartan 50  Notes + arthralgia, [place]; LE edema with amlodipine, previously on 10 but improved LE edema with 5mg and adding losartan  Pt would like to try to get off this and try alternative instead   + fatigue and arthralgia still feels since starting amlodipine   BP is well controlled but wants alternative   Will try Benicar 20; monitor BP at home and f/u in 2 weeks         Resolved Hospital Problems   No resolved problems to display.       Subjective:     General appearance of alert, oriented, no complaints   Pain with rest: 3    Numbers   Pain with movement: 5    Numbers   Side Effects    1. Pruritis No    2. Nausea No    3. Motor Blockade No, 0=Ability to raise lower extremities off bed    4. Sedation No, 1=awake and alert    Objective:     Catheter site clean, dry, intact        Vitals   Vitals:    02/08/25 1051   BP:    Pulse: 92   Resp:    Temp:         Labs    Admission on 02/07/2025   Component Date Value Ref Range Status    WBC 02/08/2025 10.66  3.90 - 12.70 K/uL Final    RBC 02/08/2025 4.09 (L)  4.60 - 6.20 M/uL Final    Hemoglobin 02/08/2025 12.1 (L)  14.0 - 18.0 g/dL Final    Hematocrit 02/08/2025 35.8 (L)  40.0 - 54.0 % Final    MCV 02/08/2025 88  82 - 98 fL Final    MCH 02/08/2025 29.6  27.0 - 31.0 pg Final    MCHC 02/08/2025 33.8  32.0 - 36.0 g/dL Final    RDW 02/08/2025 12.7  11.5 - 14.5 % Final    Platelets 02/08/2025 171  150 - 450 K/uL Final    MPV  02/08/2025 9.6  9.2 - 12.9 fL Final    Immature Granulocytes 02/08/2025 0.4  0.0 - 0.5 % Final    Gran # (ANC) 02/08/2025 8.0 (H)  1.8 - 7.7 K/uL Final    Immature Grans (Abs) 02/08/2025 0.04  0.00 - 0.04 K/uL Final    Lymph # 02/08/2025 1.4  1.0 - 4.8 K/uL Final    Mono # 02/08/2025 1.2 (H)  0.3 - 1.0 K/uL Final    Eos # 02/08/2025 0.1  0.0 - 0.5 K/uL Final    Baso # 02/08/2025 0.01  0.00 - 0.20 K/uL Final    nRBC 02/08/2025 0  0 /100 WBC Final    Gran % 02/08/2025 74.5 (H)  38.0 - 73.0 % Final    Lymph % 02/08/2025 13.2 (L)  18.0 - 48.0 % Final    Mono % 02/08/2025 11.2  4.0 - 15.0 % Final    Eosinophil % 02/08/2025 0.6  0.0 - 8.0 % Final    Basophil % 02/08/2025 0.1  0.0 - 1.9 % Final    Differential Method 02/08/2025 Automated   Final   Admission on 02/06/2025, Discharged on 02/07/2025   Component Date Value Ref Range Status    WBC 02/06/2025 14.20 (H)  3.90 - 12.70 K/uL Final    RBC 02/06/2025 4.86  4.60 - 6.20 M/uL Final    Hemoglobin 02/06/2025 14.7  14.0 - 18.0 g/dL Final    Hematocrit 02/06/2025 41.4  40.0 - 54.0 % Final    MCV 02/06/2025 85  82 - 98 fL Final    MCH 02/06/2025 30.2  27.0 - 31.0 pg Final    MCHC 02/06/2025 35.5  32.0 - 36.0 g/dL Final    RDW 02/06/2025 12.4  11.5 - 14.5 % Final    Platelets 02/06/2025 211  150 - 450 K/uL Final    MPV 02/06/2025 9.4  9.2 - 12.9 fL Final    Immature Granulocytes 02/06/2025 0.5  0.0 - 0.5 % Final    Gran # (ANC) 02/06/2025 12.1 (H)  1.8 - 7.7 K/uL Final    Immature Grans (Abs) 02/06/2025 0.07 (H)  0.00 - 0.04 K/uL Final    Lymph # 02/06/2025 0.9 (L)  1.0 - 4.8 K/uL Final    Mono # 02/06/2025 1.0  0.3 - 1.0 K/uL Final    Eos # 02/06/2025 0.0  0.0 - 0.5 K/uL Final    Baso # 02/06/2025 0.06  0.00 - 0.20 K/uL Final    nRBC 02/06/2025 0  0 /100 WBC Final    Gran % 02/06/2025 85.4 (H)  38.0 - 73.0 % Final    Lymph % 02/06/2025 6.6 (L)  18.0 - 48.0 % Final    Mono % 02/06/2025 7.0  4.0 - 15.0 % Final    Eosinophil % 02/06/2025 0.1  0.0 - 8.0 % Final    Basophil %  02/06/2025 0.4  0.0 - 1.9 % Final    Differential Method 02/06/2025 Automated   Final    Sodium 02/06/2025 134 (L)  136 - 145 mmol/L Final    Potassium 02/06/2025 2.9 (L)  3.5 - 5.1 mmol/L Final    Chloride 02/06/2025 95  95 - 110 mmol/L Final    CO2 02/06/2025 28  23 - 29 mmol/L Final    Glucose 02/06/2025 129 (H)  70 - 110 mg/dL Final    BUN 02/06/2025 14  6 - 20 mg/dL Final    Creatinine 02/06/2025 0.9  0.5 - 1.4 mg/dL Final    Calcium 02/06/2025 8.4 (L)  8.7 - 10.5 mg/dL Final    Total Protein 02/06/2025 6.3  6.0 - 8.4 g/dL Final    Albumin 02/06/2025 3.8  3.5 - 5.2 g/dL Final    Total Bilirubin 02/06/2025 0.4  0.1 - 1.0 mg/dL Final    Alkaline Phosphatase 02/06/2025 81  40 - 150 U/L Final    AST 02/06/2025 19  10 - 40 U/L Final    ALT 02/06/2025 18  10 - 44 U/L Final    eGFR 02/06/2025 >60  >60 mL/min/1.73 m^2 Final    Anion Gap 02/06/2025 11  8 - 16 mmol/L Final    Prothrombin Time 02/06/2025 11.4  9.0 - 12.5 sec Final    INR 02/06/2025 1.0  0.8 - 1.2 Final    Group & Rh 02/06/2025 O POS   Final    Indirect Charles 02/06/2025 NEG   Final    Specimen Outdate 02/06/2025 02/09/2025 23:59   Final    Hemoglobin A1C 02/06/2025 5.8 (H)  4.0 - 5.6 % Final    Estimated Avg Glucose 02/06/2025 120  68 - 131 mg/dL Final    Magnesium 02/06/2025 1.9  1.6 - 2.6 mg/dL Final    Phosphorus 02/06/2025 3.4  2.7 - 4.5 mg/dL Final    Vit D, 25-Hydroxy 02/06/2025 30  30 - 96 ng/mL Final    aPTT 02/06/2025 23.4  21.0 - 32.0 sec Final    Prealbumin 02/06/2025 24  20 - 43 mg/dL Final    Transferrin 02/06/2025 210  200 - 375 mg/dL Final        Meds   Current Facility-Administered Medications   Medication Dose Route Frequency Provider Last Rate Last Admin    acetaminophen tablet 1,000 mg  1,000 mg Oral Q6H Jackie Zamudio MD   1,000 mg at 02/08/25 0601    apixaban tablet 2.5 mg  2.5 mg Oral BID Lisa Vasquez MD   2.5 mg at 02/08/25 0848    bisacodyL suppository 10 mg  10 mg Rectal Daily PRN Jackie Zamudio MD         celecoxib capsule 200 mg  200 mg Oral Daily Vlad Walls MD   200 mg at 02/08/25 0848    chlorthalidone tablet 25 mg  25 mg Oral Daily Jackie Zamudio MD   25 mg at 02/08/25 0848    methocarbamoL tablet 500 mg  500 mg Oral Q6H Vlad Walls MD   500 mg at 02/08/25 0602    mupirocin 2 % ointment 1 g  1 g Nasal BID Jackie Zamudio MD   1 g at 02/08/25 0848    ondansetron injection 4 mg  4 mg Intravenous Q12H PRN Jackie Zamudio MD        polyethylene glycol packet 17 g  17 g Oral Daily Jackie Zamudio MD   17 g at 02/08/25 0848    prochlorperazine injection Soln 5 mg  5 mg Intravenous Q6H PRN Jackie Zamudio MD        ropivacaine 0.2% Perineural Pump infusion 500 ML   Perineural Continuous Vlad Walls MD   New Bag at 02/07/25 1241    senna-docusate 8.6-50 mg per tablet 1 tablet  1 tablet Oral BID Jackie Zamudio MD   1 tablet at 02/08/25 0848         Assessment:     Pain control adequate Continue SIFI PNC    Plan:     Patient doing well, continue present treatment.   Tylenol 1g q6 x8 doses then 1g q8   Celebrex 200mg daily   Robaxin 500mg q6h    Case still to be discussed with staff, Dr. Romero. Final recommendations per attestation above.       Thank you for your consult. We will follow up with the patient. Please call [phone] or [phone] with any questions.    Slick Walls MD  Anesthesiology  PGY-3/CA-2

## 2025-02-08 NOTE — ASSESSMENT & PLAN NOTE
Potassium low at 2.9 on admit and related to Chlorthalidone use. Replaced with oral potassium. Patient's most recent potassium results are listed below.   Recent Labs     02/06/25  1839   K 2.9*       Plan  - Replete potassium per protocol  - Monitor potassium Daily.  - Magnesium normal at 1.9 so no replacement needed.

## 2025-02-08 NOTE — PROGRESS NOTES
"[facility] - Surgery  Orthopedics  Progress Note    Patient Name: Juarez Keane  MRN: [id number]  Admission Date: 2/7/2025  Hospital Length of Stay: 1 days  Attending Provider: Jackie Zamudio MD  Primary Care Provider: Sandrine Olivo DO  Follow-up For: Procedure(s) (LRB):  INSERTION, INTRAMEDULLARY ASHLEY, FEMUR (Left)    Post-Operative Day: 1 Day Post-Op  Subjective:     Principal Problem:Closed displaced intertrochanteric fracture of left femur    Principal Orthopedic Problem: s/p L IT IMN 2/7     Interval History: Patient seen and examined at bedside. NAEON. Patient doing well. Patient remained HDS, afebrile overnight. No complaints. Pain well controlled. Hgb 12.1, Hct 35.8 today on am labs. Anticipate working with PT today.       Review of patient's allergies indicates:   Allergen Reactions    Sulfa (sulfonamide antibiotics)        Current Facility-Administered Medications   Medication    0.9%  NaCl infusion (for blood administration)    acetaminophen tablet 1,000 mg    apixaban tablet 2.5 mg    bisacodyL suppository 10 mg    celecoxib capsule 200 mg    chlorthalidone tablet 25 mg    methocarbamoL tablet 500 mg    mupirocin 2 % ointment 1 g    ondansetron injection 4 mg    polyethylene glycol packet 17 g    potassium chloride 10 mEq in 100 mL IVPB    potassium chloride 10 mEq in 100 mL IVPB    prochlorperazine injection Soln 5 mg    ropivacaine 0.2% Perineural Pump infusion 500 ML    senna-docusate 8.6-50 mg per tablet 1 tablet     Objective:     Vital Signs (Most Recent):  Temp: 97.6 °F (36.4 °C) (02/08/25 0510)  Pulse: 75 (02/08/25 0510)  Resp: 16 (02/08/25 0510)  BP: (!) 112/56 (02/08/25 0510)  SpO2: 95 % (02/08/25 0510) Vital Signs (24h Range):  Temp:  [97.6 °F (36.4 °C)-99.3 °F (37.4 °C)] 97.6 °F (36.4 °C)  Pulse:  [67-99] 75  Resp:  [15-21] 16  SpO2:  [95 %-100 %] 95 %  BP: (105-141)/(56-91) 112/56     Weight: 79.4 kg (175 lb)  Height: 5' 9.09" (175.5 cm)  Body mass index is 25.77 " kg/m².      Intake/Output Summary (Last 24 hours) at 2/8/2025 0635  Last data filed at 2/7/2025 1238  Gross per 24 hour   Intake 1100 ml   Output 550 ml   Net 550 ml        Ortho/SPM Exam   AAOx4  NAD  Reg rate  No increased WOB    LLE:  Dressing c/d/i  SILT T/SP/DP/Morfin/Sa  Motor intact T/SP/DP  WWP extremities  FCDs in place and functioning   Significant Labs: All pertinent labs within the past 24 hours have been reviewed.    Significant Imaging: I have reviewed all pertinent imaging results/findings.  Assessment/Plan:     * Closed displaced intertrochanteric fracture of left femur s/p IM nail on 2/7/2025  59M sp fall 2/6/25 with left intertrochanteric femur fx    -sp IMN L femur 2/7/25 with Dr Taylor    Multimodal pain regimen minimizing narcotic use. PNC per APS  WBAT BLE with a walker   PT/OT  DVT ppx w SCDs, eliquis 2.5 bid x 30d  FU 2 wks post op with ortho trauma MIKE (patient will be contacted with appointment details)    » Dispo: DC when medically ready                Rama Iglesias MD  Orthopedics  [facility] - Surgery

## 2025-02-08 NOTE — PLAN OF CARE
Problem: Adult Inpatient Plan of Care  Goal: Plan of Care Review  Outcome: Progressing  Goal: Patient-Specific Goal (Individualized)  Outcome: Progressing  Goal: Absence of Hospital-Acquired Illness or Injury  Outcome: Progressing  Goal: Optimal Comfort and Wellbeing  Outcome: Progressing  Goal: Readiness for Transition of Care  Outcome: Progressing     Problem: Infection  Goal: Absence of Infection Signs and Symptoms  Outcome: Progressing     Problem: Hip Fracture Medical Management  Goal: Optimal Coping with Change in Health Status  Outcome: Progressing  Goal: Absence of Bleeding  Outcome: Progressing  Goal: Effective Bowel Elimination  Outcome: Progressing  Goal: Baseline Cognitive Function Maintained  Outcome: Progressing  Goal: Absence of Embolism  Outcome: Progressing  Goal: Fracture Stability  Outcome: Progressing  Goal: Optimal Functional Performance  Outcome: Progressing  Goal: Pain Control and Function  Outcome: Progressing  Goal: Effective Urinary Elimination  Outcome: Progressing     Problem: Surgery Nonspecified  Goal: Absence of Bleeding  Outcome: Progressing  Goal: Effective Bowel Elimination  Outcome: Progressing  Goal: Fluid and Electrolyte Balance  Outcome: Progressing  Goal: Blood Glucose Level Within Targeted Range  Outcome: Progressing  Goal: Absence of Infection Signs and Symptoms  Outcome: Progressing  Goal: Anesthesia/Sedation Recovery  Outcome: Progressing  Goal: Optimal Pain Control and Function  Outcome: Progressing  Goal: Nausea and Vomiting Relief  Outcome: Progressing  Goal: Effective Urinary Elimination  Outcome: Progressing  Goal: Effective Oxygenation and Ventilation  Outcome: Progressing     Problem: Wound  Goal: Optimal Coping  Outcome: Progressing  Goal: Optimal Functional Ability  Outcome: Progressing  Goal: Absence of Infection Signs and Symptoms  Outcome: Progressing  Goal: Improved Oral Intake  Outcome: Progressing  Goal: Optimal Pain Control and Function  Outcome:  Progressing  Goal: Skin Health and Integrity  Outcome: Progressing  Goal: Optimal Wound Healing  Outcome: Progressing     Problem: Fall Injury Risk  Goal: Absence of Fall and Fall-Related Injury  Outcome: Progressing     Problem: Anesthesia/Analgesia, Neuraxial  Goal: Safe, Effective Infusion Delivery  Outcome: Progressing  Goal: Absence of Infection Signs and Symptoms  Outcome: Progressing  Goal: Nausea and Vomiting Relief  Outcome: Progressing  Goal: Effective Pain Control  Outcome: Progressing  Goal: Effective Oxygenation and Ventilation  Outcome: Progressing  Goal: Baseline Motor Function  Outcome: Progressing  Goal: Effective Urinary Elimination  Outcome: Progressing

## 2025-02-08 NOTE — ASSESSMENT & PLAN NOTE
Patient admitted after slip and fall at work with closed displaced left intertrochanteric fracture. Orthopedic surgery consulted and recommending operative repair of fracture.   - Patient taken to OR on 2/7/2025 and underwent left femur cephalomedullary nail fixation by Dr. Willie Taylor.   - Post-op patient weight bear as tolerated and range of motion as tolerated to the left lower extremity as per Orthopedics recommendation.   - Patient placed on Apixiban 2.5 mg po BID and ANNMARIE/SCD's for DVT prophylaxis post-op and will need for total of 30 days.   - Vitamin D level normal at 30 on admit.   - Perineural pain catheter placed by Anesthesia Pain Service with continuous infusion of Ropivacaine to help with pain control post-op and Anesthesia Pain Service managing while patient in the hospital. Appreciate Anesthesia assistance.   - Patient placed on multimodal pain management post-op with Tylenol 1000 mg po every 6 hours, Robaxin 500 mg po every 6 hours, and Celebrex 200 mg po daily post-op and will continue as pain controlled at present to left hip.   - PT/OT consulted post-op.   - X-ray AP pelvis and left femur AP lateral views at subsequent follow-ups: Follow-up postop 2 weeks, 6 weeks, 3 months, 6 months, 1 year. Labs reviewed.   - Ortho following and managing surgical site.  - Surgical bandages to remain in place until Orthopedic clinic follow-up.

## 2025-02-08 NOTE — PLAN OF CARE
Problem: Adult Inpatient Plan of Care  Goal: Plan of Care Review  Outcome: Progressing  Goal: Patient-Specific Goal (Individualized)  Outcome: Progressing  Goal: Absence of Hospital-Acquired Illness or Injury  Outcome: Progressing  Goal: Optimal Comfort and Wellbeing  Outcome: Progressing  Goal: Readiness for Transition of Care  Outcome: Progressing     Problem: Infection  Goal: Absence of Infection Signs and Symptoms  Outcome: Progressing     Problem: Hip Fracture Medical Management  Goal: Optimal Coping with Change in Health Status  Outcome: Progressing  Goal: Absence of Bleeding  Outcome: Progressing  Goal: Effective Bowel Elimination  Outcome: Progressing  Goal: Baseline Cognitive Function Maintained  Outcome: Progressing  Goal: Absence of Embolism  Outcome: Progressing  Goal: Fracture Stability  Outcome: Progressing  Goal: Optimal Functional Performance  Outcome: Progressing  Goal: Pain Control and Function  Outcome: Progressing  Goal: Effective Urinary Elimination  Outcome: Progressing     Problem: Surgery Nonspecified  Goal: Absence of Bleeding  Outcome: Progressing  Goal: Effective Bowel Elimination  Outcome: Progressing  Goal: Fluid and Electrolyte Balance  Outcome: Progressing  Goal: Blood Glucose Level Within Targeted Range  Outcome: Progressing  Goal: Absence of Infection Signs and Symptoms  Outcome: Progressing  Goal: Anesthesia/Sedation Recovery  Outcome: Progressing  Goal: Optimal Pain Control and Function  Outcome: Progressing  Goal: Nausea and Vomiting Relief  Outcome: Progressing  Goal: Effective Urinary Elimination  Outcome: Progressing  Goal: Effective Oxygenation and Ventilation  Outcome: Progressing     Problem: Anesthesia/Analgesia, Neuraxial  Goal: Safe, Effective Infusion Delivery  Outcome: Progressing  Goal: Absence of Infection Signs and Symptoms  Outcome: Progressing  Goal: Nausea and Vomiting Relief  Outcome: Progressing  Goal: Effective Pain Control  Outcome: Progressing  Goal:  Effective Oxygenation and Ventilation  Outcome: Progressing  Goal: Baseline Motor Function  Outcome: Progressing  Goal: Effective Urinary Elimination  Outcome: Progressing     Problem: Fall Injury Risk  Goal: Absence of Fall and Fall-Related Injury  Outcome: Progressing     Problem: Wound  Goal: Optimal Coping  Outcome: Progressing  Goal: Optimal Functional Ability  Outcome: Progressing  Goal: Absence of Infection Signs and Symptoms  Outcome: Progressing  Goal: Improved Oral Intake  Outcome: Progressing  Goal: Optimal Pain Control and Function  Outcome: Progressing  Goal: Skin Health and Integrity  Outcome: Progressing  Goal: Optimal Wound Healing  Outcome: Progressing    Patient is awake and alert, denies excess pain or discomfort at present. Sitting in chair at bedside, ambulating with walker.  PNC remains in place. Left hip dressings CDI with palpable pulses noted distally x2. POC discussed with patient and wife at bedside, all questions asked and answered, no concerns noted. POC continues as ordered. Bed remains in low position, call light and personal items in reach. No distress noted.

## 2025-02-08 NOTE — ASSESSMENT & PLAN NOTE
59M sp fall 2/6/25 with left intertrochanteric femur fx    -sp IMN L femur 2/7/25 with Dr Taylor    Multimodal pain regimen minimizing narcotic use. PNC per APS  WBAT BLE with a walker   PT/OT  DVT ppx w SCDs, eliquis 2.5 bid x 30d  FU 2 wks post op with ortho trauma MIKE (patient will be contacted with appointment details)    » Dispo: DC when medically ready

## 2025-02-08 NOTE — PLAN OF CARE
PT Evaluation completed 2025   PT goals and POC established.   Low intensity physical therapy recommendation post-acutely.    Problem: Physical Therapy  Goal: Physical Therapy Goal  Description: Goals to be met by: 25     Patient will increase functional independence with mobility by performin. Supine to sit with Modified [place]  2. Sit to supine with Modified [place]  3. Sit to stand transfer with Modified [place]  4. Bed to chair transfer with Modified [place] using Rolling Walker  5. Gait  x 150 feet with Modified [place] using Rolling Walker.   6. Ascend/Descend 6 inch curb step with Modified [place] using Rolling Walker.  7. Lower extremity exercise program x15 reps per handout, with assistance as needed    Outcome: Progressing

## 2025-02-08 NOTE — PLAN OF CARE
Case Management Assessment     PCP:Herlinda Olivo   Pharmacy: [facility] # [id number] [phone]    Patient Arrived From: home   Existing Help at Home: his spouse     Barriers to Discharge: none     Discharge Plan:    A. Home    B. Home vs home with home health     .   02/08/25 1307   Discharge Assessment   Assessment Type Discharge Planning Assessment   Confirmed/corrected address, phone number and insurance Yes   Confirmed Demographics Correct on Facesheet   Source of Information patient   If unable to respond/provide information was family/caregiver contacted? Yes   Contact Name/Number Wanda Keane , spouse at [phone]   Does patient/caregiver understand observation status Yes   Communicated HALI with patient/caregiver Yes   Reason For Admission Hypokalemia   People in Home spouse   Facility Arrived From: home   Do you expect to return to your current living situation? Yes   Do you have help at home or someone to help you manage your care at home? Yes   Who are your caregiver(s) and their phone number(s)? Wanda Keane , spouse at [phone]   Prior to hospitilization cognitive status: Unable to Assess   Current cognitive status: Alert/Oriented   Walking or Climbing Stairs Difficulty no   Dressing/Bathing Difficulty no   Equipment Currently Used at Home none   Readmission within 30 days? No   Patient currently being followed by outpatient case management? No   Do you currently have service(s) that help you manage your care at home? No   Do you take prescription medications? Yes   Do you have prescription coverage? Yes   Coverage [employer] [place] [facility]   Do you have any problems affording any of your prescribed medications? No   Is the patient taking medications as prescribed? yes   Who is going to help you get home at discharge? Wanda Keane , spouse at [phone]   How do you get to doctors appointments? car, drives self;family or friend will provide   Are you on dialysis? No   Do you take  coumadin? No   Discharge Plan A Home   Discharge Plan B Home with family   DME Needed Upon Discharge  none   Discharge Plan discussed with: Patient   Transition of Care Barriers None   Physical Activity   On average, how many days per week do you engage in moderate to strenuous exercise (like a brisk walk)? 3 days   On average, how many minutes do you engage in exercise at this level? 10 min   Financial Resource Strain   How hard is it for you to pay for the very basics like food, housing, medical care, and heating? Not very   Housing Stability   In the last 12 months, was there a time when you were not able to pay the mortgage or rent on time? N   At any time in the past 12 months, were you homeless or living in a shelter (including now)? N   Transportation Needs   Has the lack of transportation kept you from medical appointments, meetings, work or from getting things needed for daily living? No   Food Insecurity   Within the past 12 months, you worried that your food would run out before you got the money to buy more. Never true   Within the past 12 months, the food you bought just didn't last and you didn't have money to get more. Never true   Social Isolation   How often do you feel lonely or isolated from those around you?  Never   Alcohol Use   Q1: How often do you have a drink containing alcohol? 2-4 pr month   Q2: How many drinks containing alcohol do you have on a typical day when you are drinking? 1 or 2   Q3: How often do you have six or more drinks on one occasion? Never   Utilities   In the past 12 months has the electric, gas, oil, or water company threatened to shut off services in your home? No   Health Literacy   How often do you need to have someone help you when you read instructions, pamphlets, or other written material from your doctor or pharmacy? Sometimes   OTHER   Name(s) of People in Home Wnada Keane , spouse at [phone]

## 2025-02-08 NOTE — ASSESSMENT & PLAN NOTE
Patient's blood pressure range in the last 24 hours was: BP  Min: 105/61  Max: 127/61.The patient's inpatient anti-hypertensive regimen is listed below:  Current Antihypertensives  , Daily, Oral  chlorthalidone tablet 25 mg, Daily, Oral    Plan  - BP is controlled, no changes needed to their regimen. Resume home medication of Chlorthalidone on 2/8 as held on day of surgery on 2/7.   - Goal BP < 140/90.

## 2025-02-08 NOTE — PT/OT/SLP EVAL
Occupational Therapy   Co-Evaluation  Co-treatment performed due to patient's multiple deficits requiring two skilled therapists to appropriately and safely assess patient's strength and endurance while facilitating functional tasks in addition to accommodating for patient's activity tolerance.      Name: Juarez Keane  MRN: [id number]  Admitting Diagnosis: Closed displaced intertrochanteric fracture of left femur  Recent Surgery: Procedure(s) (LRB):  INSERTION, INTRAMEDULLARY ASHLEY, FEMUR (Left) 1 Day Post-Op    Recommendations:     Discharge Recommendations: Low Intensity Therapy  Discharge Equipment Recommendations:  walker, rolling, bedside commode  Barriers to discharge:  None    Assessment:     Juarez Keane is a 59 y.o. male with a medical diagnosis of Closed displaced intertrochanteric fracture of left femur.  He presents with the following performance deficits affecting function: weakness, impaired endurance, impaired self care skills, impaired functional mobility, gait instability, impaired balance, decreased coordination, decreased lower extremity function, decreased safety awareness, pain, decreased ROM, impaired cardiopulmonary response to activity, orthopedic precautions. Pt agreeable to therapy and tolerated well. Pt would continue to benefit from skilled OT services to maximize functional independence with ADLs and functional mobility, reduce caregiver burden, and facilitate safe discharge in the least restrictive environment.  Patient continues to demonstrate the need for low intensity therapy on a scheduled basis exhibited by decreased independence with self-care and functional mobility     Rehab Prognosis: Good; patient would benefit from acute skilled OT services to address these deficits and reach maximum level of function.       Plan:     Patient to be seen daily (4x/wk after pathway completed) to address the above listed problems via self-care/home management, therapeutic activities,  therapeutic exercises, neuromuscular re-education  Plan of Care Expires: 03/10/25  Plan of Care Reviewed with: patient, spouse    Subjective     Chief Complaint: pain  Patient/Family Comments/goals: to return to PLOF    Occupational Profile:  Living Environment: Pt lives with his spouse in a H with 1 DEBBIE. T/s combo with bath bench  Previous level of function: independent   Roles and Routines: (+) drives; works  Equipment Used at Home: bath bench  Assistance upon Discharge: spouse    Pain/Comfort:  Pain Rating 1: other (see comments)  Location - Side 1: Left  Location - Orientation 1: generalized  Location 1: leg  Pain Addressed 1: Pre-medicate for activity, Reposition, Distraction  Pain Rating Post-Intervention 1: other (see comments) (pt did not rate)    Patients cultural, spiritual, Mormon conflicts given the current situation: no    Objective:   Additional staff present:  MARYANN Veras    Communicated with: RN prior to session.  Patient found HOB elevated with FCD, perineural catheter, telemetry upon OT entry to room.    General Precautions: Standard, fall  Orthopedic Precautions: LLE weight bearing as tolerated  Braces: N/A  Respiratory Status: Room air    Occupational Performance:    Bed Mobility:    Patient completed Supine to Sit with minimum assistance    Functional Mobility/Transfers:  Patient completed Sit <> Stand Transfer with stand by assistance  with  rolling walker   Patient completed Toilet Transfer Step Transfer technique with contact guard assistance with  rolling walker  Functional Mobility: Pt engaged in functional mobility to simulate household/community distances household distances in room and hallway with CGA and RW in order to maximize functional endurance and standing balance required for engagement in occupations of choice   No LOB or SOB noted  Cues for sequencing and RW management    Activities of Daily Living:  Grooming: stand by assistance for hand hygiene while standing at sink with  RW  Upper Body Dressing: stand by assistance [facility] gown over back  Lower Body Dressing: moderate assistance required to don L sock 2/2 pain  Toileting: stand by assistance seated pericare from toilet    Cognitive/Visual Perceptual:  Cognitive/Psychosocial Skills:     -       Oriented to: Person, Place, Time, and Situation   -       Follows Commands/attention:Follows two-step commands  -       Safety awareness/insight to disability: impaired   -       Mood/Affect/Coping skills/emotional control: Cooperative    Physical Exam:  Upper Extremity Range of Motion:     -       Right Upper Extremity: WFL  -       Left Upper Extremity: WFL  Upper Extremity Strength:    -       Right Upper Extremity: WFL  -       Left Upper Extremity: WFL    AMPAC 6 Click ADL:  AMPAC Total Score: 17    Treatment & Education:  -Education on weightbearing precautions  -Education on energy conservation and task modification to maximize safety and (I) during ADLs and mobility  -Education on importance of OOB activity to improve overall activity tolerance and promote recovery  -Pt educated to call for assistance and to transfer with hospital staff only  -Provided education regarding role of OT, POC, & discharge recommendations with pt and wife verbalizing understanding.  Pt had no further questions & when asked whether there were any concerns pt reported none.     Patient left up in chair with all lines intact, call button in reach, RN notified, and wife present    GOALS:   Multidisciplinary Problems       Occupational Therapy Goals          Problem: Occupational Therapy    Goal Priority Disciplines Outcome Interventions   Occupational Therapy Goal     OT, PT/OT Progressing    Description: Goals to be met by: 3/10/25     Patient will increase functional independence with ADLs by performing:    LE Dressing with Modified [place].  Grooming while standing at sink with Modified [place].  Toileting from toilet/bedside commode with  Modified [place] for hygiene and clothing management.   Supine to sit with Modified [place].  Toilet transfer to toilet/bedside commode with Modified [place] and LRAD.                         DME Justifications:   Juarez Lo requires a commode for home use because he is confined to a single room.   Juarez Lo's mobility limitation cannot be sufficiently resolved by the use of a cane. His functional mobility deficit can be sufficiently resolved with the use of a Rolling Walker. Patient's mobility limitation significantly impairs their ability to participate in one of more activities of daily living.  The use of a RW will significantly improve the patient's ability to participate in MRADLS and the patient will use it on regular basis in the home.    History:     Past Medical History:   Diagnosis Date    Degenerative disc disease, cervical     Essential hypertension 07/21/2022    GERD (gastroesophageal reflux disease)     Optic neuritis, right     S/P cervical spinal fusion 11/02/2017         Past Surgical History:   Procedure Laterality Date    Bilateral knee arthroscopic      HERNIA REPAIR      Left     SPINE SURGERY      Cervical Fusion C5,C6    VASECTOMY  07/1990       Time Tracking:     OT Date of Treatment: 02/08/25  OT Start Time: 0837  OT Stop Time: 0907  OT Total Time (min): 30 min    Billable Minutes:Evaluation 7  Self Care/Home Management 13  Therapeutic Activity 10    2/8/2025

## 2025-02-08 NOTE — PROGRESS NOTES
[facility] Medicine  Progress Note    Patient Name: Juarez Keane  MRN: [id number]  Patient Class: IP- Inpatient   Admission Date: 2/7/2025  Length of Stay: 1 days  Attending Physician: Jackie Zamudio MD  Primary Care Provider: Sandrine Olivo DO        Subjective     Principal Problem:Closed displaced intertrochanteric fracture of left femur        HPI:  59 y.o.male with essential hypertension, GERD, history of optic neuritis to right eye presented to the [facility] ER as transfer from [facility] ED with complaint of left hip pain. Patient states pain is sharp/stabbing in the left groin, upper leg and is aggravated by any weight bearing. He reports onset of symptoms was 1 day(s) after a fall after ambulating at work. Patient reports that he was at work yesterday and was walking up a gravel ramp and slipped in the gravel and fell on his left hip and upper leg area. After fall patient was able to sit up but could not stand up or bear weight to his left leg due to pain that he described as 10/10 pain to left groin and upper thigh area. Patient was transported to [facility] ED and he had X-rays done that revealed a closed displaced left intertrochanteric fracture. Ortho at [place] consulted and recommended transfer to [facility] for Ortho intervention at [facility] so patient was transferred from ED to ED early this am. Patient currently rates his pain as 4/10 to left hip but just received IV Toradol and IV Dilaudid. Wife at bedside in ED. Ortho evaluated patient in ED and planning on taking to OR this am to repair left hip fracture. Patient denies head trauma. Patient denies to loss of consciousness, denies syncope, denies chest pain, denies dizziness prior or after fall. X-ray obtained in ER revealed left intertrochanteric hip fracture.   Prior to admission patient's functional mobility was independent with no assistive device for home and community distances. Patient does not have  history of gait or balance problems. Patient does not have history of previous falls or fractures. Patient does not have previous cardiac history such as MI or CAD. Patient does not have previous history of TIA or stroke. Patient does not have previous history of compensated heart failure. Patient does not have history of diabetes. Patient does not have history of advanced kidney disease with creatinine > 2. Patient currently lives with their spouse.  Labs reviewed. Potassium low at 2.9 and likely related to diuretic he takes for his HTN. Patient ordered to receive Potassium rider 10 mEq x 2 in ED to replace. Hgb 14.7. Creatinine normal at 0.9. LFT's normal. HgA1C 5.8% and mildly elevated. Vitamin D normal at 30. Magnesium normal at 1.9. INR normal at 1.0. EKG reviewed my myself and normal sinus rhythm with no ischemic changes. CXR reviewed by myself and normal with no infiltrates or masses or edema.   Patient reports only prescription medication he is currently taking is Chlorthalidone 25 mg po daily that he was recently prescribed for his HTN.       Overview/Hospital Course:  Patient admitted to [facility] Medicine Team H: Hip Fracture team and started on Hip Fracture Pathway with Orthopedic surgery consult for closed left intertrochanteric femur fracture. Patient was seen and evaluated by Orthopedic surgery who recommended operative repair of hip fracture. Patient was medically optimized prior to surgery and was taken to OR after optimization on 2/7/2025. Patient underwent left femur cephalomedullary nail fixation by Dr. Willie Taylor. Post-op patient weight bear as tolerated and range of motion as tolerated to the left lower extremity as per Orthopedics recommendation. Patient placed on Apixiban 2.5 mg po BID and ANNMARIE/SCD's for DVT prophylaxis post-op and will need for total of 30 days. Perineural pain catheter placed by Anesthesia Pain Service with continuous infusion of Ropivacaine to help with pain  control post-op and Anesthesia Pain Service managing while patient in the hospital. Patient placed on multimodal pain management post-op with Tylenol 1000 mg po every 6 hours, Robaxin 500 mg po every 6 hours, and Celebrex 200 mg po daily post-op and will continue. PT/OT consulted post-op. X-ray AP pelvis and left femur AP lateral views at subsequent follow-ups: Follow-up postop 2 weeks, 6 weeks, 3 months, 6 months, 1 year.      Interval History: Patient admitted yesterday for closed left intertrochanteric femur fracture. Ortho consulted and patient taken to OR yesterday and underwent left femur cephalomedullary nail fixation by Dr. Willie Taylor. Post-op, patient is weight bear as tolerated and range of motion as tolerated to the left lower extremity as per Orthopedics recommendation. Patient placed on Apixiban 2.5 mg po BID and ANNMARIE/SCD's for DVT prophylaxis post-op and will need for total of 30 days. Perineural pain catheter placed by Anesthesia Pain Service with continuous infusion of Ropivacaine to help with pain control post-op and Anesthesia Pain Service managing while patient in the hospital. Patient placed on multimodal pain management post-op with Tylenol 1000 mg po every 6 hours, Robaxin 500 mg po every 6 hours, and Celebrex 200 mg po daily post-op and will continue. Patient rates pain as 3/10 to left hip and thigh area when moving or ambulating but no pain at rest. PT/OT consulted post-op and worked with patient this am. Labs reviewed. Hgb stable at 12.1.    Review of Systems   Constitutional:  Negative for fever.   Respiratory:  Negative for cough and shortness of breath.    Cardiovascular:  Negative for chest pain.   Gastrointestinal:  Negative for abdominal pain and nausea.   Musculoskeletal:  Positive for arthralgias (Left hip) and myalgias (Left thigh).   Neurological:  Negative for dizziness.   Psychiatric/Behavioral:  Negative for agitation and confusion.      Objective:     Vital Signs (Most  Recent):  Temp: 97.9 °F (36.6 °C) (02/08/25 1126)  Pulse: 91 (02/08/25 1126)  Resp: 16 (02/08/25 1126)  BP: 119/56 (02/08/25 1126)  SpO2: 98 % (02/08/25 1126) on room air Vital Signs (24h Range):  Temp:  [97.6 °F (36.4 °C)-99.3 °F (37.4 °C)] 97.9 °F (36.6 °C)  Pulse:  [67-93] 91  Resp:  [16-21] 16  SpO2:  [95 %-99 %] 98 %  BP: (105-130)/(56-74) 119/56     Weight: 79.4 kg (175 lb)  Body mass index is 25.77 kg/m².    Intake/Output Summary (Last 24 hours) at 2/8/2025 1340  Last data filed at 2/8/2025 1249  Gross per 24 hour   Intake 900 ml   Output --   Net 900 ml         Physical Exam  Vitals and nursing note reviewed.   Constitutional:       General: He is awake. He is not in acute distress.     Appearance: Normal appearance. He is well-developed and normal weight. He is not ill-appearing.      Comments: Patient sitting up in bedside chair in no distress and family at bedside. Patient very comfortable appearing on exam.    Eyes:      Conjunctiva/sclera: Conjunctivae normal.   Cardiovascular:      Rate and Rhythm: Normal rate and regular rhythm.      Heart sounds: Normal heart sounds. No murmur heard.  Pulmonary:      Effort: Pulmonary effort is normal. No respiratory distress.      Breath sounds: Normal breath sounds. No wheezing or rales.   Abdominal:      General: Abdomen is flat. Bowel sounds are normal. There is no distension.      Palpations: Abdomen is soft.      Tenderness: There is no abdominal tenderness. There is no guarding.   Musculoskeletal:      Right lower leg: No edema.      Left lower leg: No edema.   Skin:     General: Skin is warm.      Findings: No erythema.      Comments: Surgical dressings noted on left hip and thigh area. Dressings are clean and dry and intact.   Neurological:      Mental Status: He is alert and oriented to person, place, and time.   Psychiatric:         Mood and Affect: Mood normal.         Behavior: Behavior normal. Behavior is cooperative.         Thought Content: Thought  content normal.         Judgment: Judgment normal.             Significant Labs: BMP:   Recent Labs   Lab 02/06/25  1839 02/06/25  1848   *  --    *  --    K 2.9*  --    CL 95  --    CO2 28  --    BUN 14  --    CREATININE 0.9  --    CALCIUM 8.4*  --    MG  --  1.9     CBC:   Recent Labs   Lab 02/06/25  1839 02/08/25  0649   WBC 14.20* 10.66   HGB 14.7 12.1*   HCT 41.4 35.8*    171       Significant Imaging: I have reviewed all pertinent imaging results/findings within the past 24 hours.    Assessment and Plan     * Closed displaced intertrochanteric fracture of left femur s/p IM nail on 2/7/2025  Patient admitted after slip and fall at work with closed displaced left intertrochanteric fracture. Orthopedic surgery consulted and recommending operative repair of fracture.   - Patient taken to OR on 2/7/2025 and underwent left femur cephalomedullary nail fixation by Dr. Willie Taylor.   - Post-op patient weight bear as tolerated and range of motion as tolerated to the left lower extremity as per Orthopedics recommendation.   - Patient placed on Apixiban 2.5 mg po BID and ANNMARIE/SCD's for DVT prophylaxis post-op and will need for total of 30 days.   - Vitamin D level normal at 30 on admit.   - Perineural pain catheter placed by Anesthesia Pain Service with continuous infusion of Ropivacaine to help with pain control post-op and Anesthesia Pain Service managing while patient in the hospital. Appreciate Anesthesia assistance.   - Patient placed on multimodal pain management post-op with Tylenol 1000 mg po every 6 hours, Robaxin 500 mg po every 6 hours, and Celebrex 200 mg po daily post-op and will continue as pain controlled at present to left hip.   - PT/OT consulted post-op.   - X-ray AP pelvis and left femur AP lateral views at subsequent follow-ups: Follow-up postop 2 weeks, 6 weeks, 3 months, 6 months, 1 year. Labs reviewed.   - Ortho following and managing surgical site.  - Surgical bandages  to remain in place until Orthopedic clinic follow-up.     Essential hypertension  Patient's blood pressure range in the last 24 hours was: BP  Min: 105/61  Max: 127/61.The patient's inpatient anti-hypertensive regimen is listed below:  Current Antihypertensives  , Daily, Oral  chlorthalidone tablet 25 mg, Daily, Oral    Plan  - BP is controlled, no changes needed to their regimen. Resume home medication of Chlorthalidone on 2/8 as held on day of surgery on 2/7.   - Goal BP < 140/90.     Hypokalemia  Potassium low at 2.9 on admit and related to Chlorthalidone use. Replaced with oral potassium. Patient's most recent potassium results are listed below.   Recent Labs     02/06/25  1839   K 2.9*       Plan  - Replete potassium per protocol  - Monitor potassium Daily.  - Magnesium normal at 1.9 so no replacement needed.     Prediabetes  Patient with screening HgA1C of 5.8% on admit consistent with prediabetes. This is a new diagnosis. Will refer back to his primary care physician on discharge for further management. No glucose monitoring needed in hospital.         VTE Risk Mitigation (From admission, onward)           Ordered     apixaban tablet 2.5 mg  2 times daily         02/07/25 1247     Place sequential compression device  Until discontinued         02/07/25 0852     Place ANNMARIE hose  Until discontinued         02/07/25 0852                    Discharge Planning   HALI: 2/10/2025     Code Status: Full Code   Medical Readiness for Discharge Date: 2/10/2025  Discharge Plan A: Home with home health and DME      Juarez Lo's mobility limitation cannot be sufficiently resolved by the use of a cane. His functional mobility deficit can be sufficiently resolved with the use of a Rolling Walker. Patient's mobility limitation significantly impairs their ability to participate in one of more activities of daily living.  The use of a RW will significantly improve the patient's ability to participate in MRADLS and the patient  will use it on regular basis in the home.      Jackie Zamudio MD  Department of Hospital Medicine   [facility] - Surgery

## 2025-02-09 VITALS
TEMPERATURE: 99 F | HEIGHT: 69 IN | WEIGHT: 175 LBS | RESPIRATION RATE: 17 BRPM | BODY MASS INDEX: 25.92 KG/M2 | OXYGEN SATURATION: 100 % | DIASTOLIC BLOOD PRESSURE: 64 MMHG | SYSTOLIC BLOOD PRESSURE: 128 MMHG | HEART RATE: 80 BPM

## 2025-02-09 LAB
ANION GAP SERPL CALC-SCNC: 10 MMOL/L (ref 8–16)
BASOPHILS # BLD AUTO: 0.04 K/UL (ref 0–0.2)
BASOPHILS NFR BLD: 0.5 % (ref 0–1.9)
BUN SERPL-MCNC: 13 MG/DL (ref 6–20)
CALCIUM SERPL-MCNC: 8.3 MG/DL (ref 8.7–10.5)
CHLORIDE SERPL-SCNC: 102 MMOL/L (ref 95–110)
CO2 SERPL-SCNC: 30 MMOL/L (ref 23–29)
CREAT SERPL-MCNC: 0.8 MG/DL (ref 0.5–1.4)
DIFFERENTIAL METHOD BLD: ABNORMAL
EOSINOPHIL # BLD AUTO: 0.2 K/UL (ref 0–0.5)
EOSINOPHIL NFR BLD: 2 % (ref 0–8)
ERYTHROCYTE [DISTWIDTH] IN BLOOD BY AUTOMATED COUNT: 12.8 % (ref 11.5–14.5)
EST. GFR  (NO RACE VARIABLE): >60 ML/MIN/1.73 M^2
GLUCOSE SERPL-MCNC: 102 MG/DL (ref 70–110)
HCT VFR BLD AUTO: 32.9 % (ref 40–54)
HGB BLD-MCNC: 11.4 G/DL (ref 14–18)
IMM GRANULOCYTES # BLD AUTO: 0.02 K/UL (ref 0–0.04)
IMM GRANULOCYTES NFR BLD AUTO: 0.3 % (ref 0–0.5)
LYMPHOCYTES # BLD AUTO: 1.6 K/UL (ref 1–4.8)
LYMPHOCYTES NFR BLD: 21.1 % (ref 18–48)
MCH RBC QN AUTO: 30.6 PG (ref 27–31)
MCHC RBC AUTO-ENTMCNC: 34.7 G/DL (ref 32–36)
MCV RBC AUTO: 88 FL (ref 82–98)
MONOCYTES # BLD AUTO: 0.8 K/UL (ref 0.3–1)
MONOCYTES NFR BLD: 9.9 % (ref 4–15)
NEUTROPHILS # BLD AUTO: 5.1 K/UL (ref 1.8–7.7)
NEUTROPHILS NFR BLD: 66.2 % (ref 38–73)
NRBC BLD-RTO: 0 /100 WBC
PLATELET # BLD AUTO: 173 K/UL (ref 150–450)
PMV BLD AUTO: 10.2 FL (ref 9.2–12.9)
POTASSIUM SERPL-SCNC: 3.5 MMOL/L (ref 3.5–5.1)
RBC # BLD AUTO: 3.73 M/UL (ref 4.6–6.2)
SODIUM SERPL-SCNC: 142 MMOL/L (ref 136–145)
WBC # BLD AUTO: 7.64 K/UL (ref 3.9–12.7)

## 2025-02-09 PROCEDURE — 97110 THERAPEUTIC EXERCISES: CPT

## 2025-02-09 PROCEDURE — 25000003 PHARM REV CODE 250

## 2025-02-09 PROCEDURE — 25000003 PHARM REV CODE 250: Performed by: INTERNAL MEDICINE

## 2025-02-09 PROCEDURE — 99231 SBSQ HOSP IP/OBS SF/LOW 25: CPT | Mod: ,,, | Performed by: STUDENT IN AN ORGANIZED HEALTH CARE EDUCATION/TRAINING PROGRAM

## 2025-02-09 PROCEDURE — 97116 GAIT TRAINING THERAPY: CPT

## 2025-02-09 PROCEDURE — 25000003 PHARM REV CODE 250: Performed by: STUDENT IN AN ORGANIZED HEALTH CARE EDUCATION/TRAINING PROGRAM

## 2025-02-09 PROCEDURE — 36415 COLL VENOUS BLD VENIPUNCTURE: CPT | Performed by: INTERNAL MEDICINE

## 2025-02-09 PROCEDURE — 85025 COMPLETE CBC W/AUTO DIFF WBC: CPT | Performed by: INTERNAL MEDICINE

## 2025-02-09 PROCEDURE — 80048 BASIC METABOLIC PNL TOTAL CA: CPT | Performed by: INTERNAL MEDICINE

## 2025-02-09 PROCEDURE — 97530 THERAPEUTIC ACTIVITIES: CPT

## 2025-02-09 RX ORDER — OXYCODONE HYDROCHLORIDE 5 MG/1
5 TABLET ORAL EVERY 4 HOURS PRN
Qty: 10 TABLET | Refills: 0 | Status: SHIPPED | OUTPATIENT
Start: 2025-02-09

## 2025-02-09 RX ORDER — METHOCARBAMOL 750 MG/1
750 TABLET, FILM COATED ORAL EVERY 6 HOURS
Status: COMPLETED | OUTPATIENT
Start: 2025-02-09 | End: 2025-02-09

## 2025-02-09 RX ORDER — ACETAMINOPHEN 500 MG
1000 TABLET ORAL EVERY 8 HOURS PRN
COMMUNITY
Start: 2025-02-09

## 2025-02-09 RX ORDER — METHOCARBAMOL 500 MG/1
500 TABLET, FILM COATED ORAL EVERY 6 HOURS
Qty: 56 TABLET | Refills: 0 | Status: SHIPPED | OUTPATIENT
Start: 2025-02-09 | End: 2025-02-23

## 2025-02-09 RX ORDER — CELECOXIB 200 MG/1
200 CAPSULE ORAL DAILY
Qty: 14 CAPSULE | Refills: 0 | Status: SHIPPED | OUTPATIENT
Start: 2025-02-10 | End: 2025-02-24

## 2025-02-09 RX ADMIN — METHOCARBAMOL 750 MG: 750 TABLET ORAL at 11:02

## 2025-02-09 RX ADMIN — ACETAMINOPHEN 1000 MG: 500 TABLET ORAL at 12:02

## 2025-02-09 RX ADMIN — MUPIROCIN 1 G: 20 OINTMENT TOPICAL at 08:02

## 2025-02-09 RX ADMIN — CHLORTHALIDONE 25 MG: 25 TABLET ORAL at 08:02

## 2025-02-09 RX ADMIN — ACETAMINOPHEN 1000 MG: 500 TABLET ORAL at 06:02

## 2025-02-09 RX ADMIN — APIXABAN 2.5 MG: 2.5 TABLET, FILM COATED ORAL at 08:02

## 2025-02-09 RX ADMIN — METHOCARBAMOL 500 MG: 500 TABLET ORAL at 12:02

## 2025-02-09 RX ADMIN — CELECOXIB 200 MG: 200 CAPSULE ORAL at 08:02

## 2025-02-09 RX ADMIN — METHOCARBAMOL 500 MG: 500 TABLET ORAL at 06:02

## 2025-02-09 NOTE — ANESTHESIA POST-OP PAIN MANAGEMENT
Acute Pain Service Progress Note    Juarez Keane is a 59 y.o., male, [id number].    Surgery:  INSERTION, INTRAMEDULLARY ASHLEY, FEMUR (Left: Leg Upper)     Post Op Day #: 2    Catheter type: perineural  SIFI    Infusion type: Ropivacaine 0.2%  15cc/3h basal    Problem List:    Active Hospital Problems    Diagnosis  POA    *Closed displaced intertrochanteric fracture of left femur s/p IM nail on 2/7/2025 [S72.142A]  Yes    Hypokalemia [E87.6]  Yes    Prediabetes [R73.03]  Yes    Essential hypertension [I10]  Yes     Amlodipine 5 and losartan 50  Notes + arthralgia, [place]; LE edema with amlodipine, previously on 10 but improved LE edema with 5mg and adding losartan  Pt would like to try to get off this and try alternative instead   + fatigue and arthralgia still feels since starting amlodipine   BP is well controlled but wants alternative   Will try Benicar 20; monitor BP at home and f/u in 2 weeks         Resolved Hospital Problems   No resolved problems to display.       Subjective:     General appearance of alert, oriented, no complaints   Pain with rest: 2    Numbers   Pain with movement: 4    Numbers   Side Effects    1. Pruritis No    2. Nausea No    3. Motor Blockade No, 0=Ability to raise lower extremities off bed    4. Sedation No, 1=awake and alert    Objective:     Catheter site clean, dry, intact        Vitals   Vitals:    02/09/25 1108   BP: 128/64   Pulse: 80   Resp: 17   Temp: 36.9 °C (98.5 °F)        Labs    Admission on 02/07/2025   Component Date Value Ref Range Status    WBC 02/08/2025 10.66  3.90 - 12.70 K/uL Final    RBC 02/08/2025 4.09 (L)  4.60 - 6.20 M/uL Final    Hemoglobin 02/08/2025 12.1 (L)  14.0 - 18.0 g/dL Final    Hematocrit 02/08/2025 35.8 (L)  40.0 - 54.0 % Final    MCV 02/08/2025 88  82 - 98 fL Final    MCH 02/08/2025 29.6  27.0 - 31.0 pg Final    MCHC 02/08/2025 33.8  32.0 - 36.0 g/dL Final    RDW 02/08/2025 12.7  11.5 - 14.5 % Final    Platelets 02/08/2025 171  150 - 611  K/uL Final    MPV 02/08/2025 9.6  9.2 - 12.9 fL Final    Immature Granulocytes 02/08/2025 0.4  0.0 - 0.5 % Final    Gran # (ANC) 02/08/2025 8.0 (H)  1.8 - 7.7 K/uL Final    Immature Grans (Abs) 02/08/2025 0.04  0.00 - 0.04 K/uL Final    Lymph # 02/08/2025 1.4  1.0 - 4.8 K/uL Final    Mono # 02/08/2025 1.2 (H)  0.3 - 1.0 K/uL Final    Eos # 02/08/2025 0.1  0.0 - 0.5 K/uL Final    Baso # 02/08/2025 0.01  0.00 - 0.20 K/uL Final    nRBC 02/08/2025 0  0 /100 WBC Final    Gran % 02/08/2025 74.5 (H)  38.0 - 73.0 % Final    Lymph % 02/08/2025 13.2 (L)  18.0 - 48.0 % Final    Mono % 02/08/2025 11.2  4.0 - 15.0 % Final    Eosinophil % 02/08/2025 0.6  0.0 - 8.0 % Final    Basophil % 02/08/2025 0.1  0.0 - 1.9 % Final    Differential Method 02/08/2025 Automated   Final    WBC 02/09/2025 7.64  3.90 - 12.70 K/uL Final    RBC 02/09/2025 3.73 (L)  4.60 - 6.20 M/uL Final    Hemoglobin 02/09/2025 11.4 (L)  14.0 - 18.0 g/dL Final    Hematocrit 02/09/2025 32.9 (L)  40.0 - 54.0 % Final    MCV 02/09/2025 88  82 - 98 fL Final    MCH 02/09/2025 30.6  27.0 - 31.0 pg Final    MCHC 02/09/2025 34.7  32.0 - 36.0 g/dL Final    RDW 02/09/2025 12.8  11.5 - 14.5 % Final    Platelets 02/09/2025 173  150 - 450 K/uL Final    MPV 02/09/2025 10.2  9.2 - 12.9 fL Final    Immature Granulocytes 02/09/2025 0.3  0.0 - 0.5 % Final    Gran # (ANC) 02/09/2025 5.1  1.8 - 7.7 K/uL Final    Immature Grans (Abs) 02/09/2025 0.02  0.00 - 0.04 K/uL Final    Lymph # 02/09/2025 1.6  1.0 - 4.8 K/uL Final    Mono # 02/09/2025 0.8  0.3 - 1.0 K/uL Final    Eos # 02/09/2025 0.2  0.0 - 0.5 K/uL Final    Baso # 02/09/2025 0.04  0.00 - 0.20 K/uL Final    nRBC 02/09/2025 0  0 /100 WBC Final    Gran % 02/09/2025 66.2  38.0 - 73.0 % Final    Lymph % 02/09/2025 21.1  18.0 - 48.0 % Final    Mono % 02/09/2025 9.9  4.0 - 15.0 % Final    Eosinophil % 02/09/2025 2.0  0.0 - 8.0 % Final    Basophil % 02/09/2025 0.5  0.0 - 1.9 % Final    Differential Method 02/09/2025 Automated   Final     Sodium 02/09/2025 142  136 - 145 mmol/L Final    Potassium 02/09/2025 3.5  3.5 - 5.1 mmol/L Final    Chloride 02/09/2025 102  95 - 110 mmol/L Final    CO2 02/09/2025 30 (H)  23 - 29 mmol/L Final    Glucose 02/09/2025 102  70 - 110 mg/dL Final    BUN 02/09/2025 13  6 - 20 mg/dL Final    Creatinine 02/09/2025 0.8  0.5 - 1.4 mg/dL Final    Calcium 02/09/2025 8.3 (L)  8.7 - 10.5 mg/dL Final    Anion Gap 02/09/2025 10  8 - 16 mmol/L Final    eGFR 02/09/2025 >60.0  >60 mL/min/1.73 m^2 Final        Meds   Current Facility-Administered Medications   Medication Dose Route Frequency Provider Last Rate Last Admin    apixaban tablet 2.5 mg  2.5 mg Oral BID Jackie Zamudio MD   2.5 mg at 02/09/25 0814    bisacodyL suppository 10 mg  10 mg Rectal Daily PRN Jackie Zamudio MD        celecoxib capsule 200 mg  200 mg Oral Daily Vlad Walls MD   200 mg at 02/09/25 0814    chlorthalidone tablet 25 mg  25 mg Oral Daily Jackie Zamudio MD   25 mg at 02/09/25 0813    melatonin tablet 6 mg  6 mg Oral Nightly PRN Jackie Zamudio MD        methocarbamoL tablet 500 mg  500 mg Oral Q6H Vlad Walls MD   500 mg at 02/09/25 0623    mupirocin 2 % ointment 1 g  1 g Nasal BID Jackie Zamudio MD   1 g at 02/09/25 0813    ondansetron injection 4 mg  4 mg Intravenous Q12H PRN Jackie Zamudio MD        polyethylene glycol packet 17 g  17 g Oral Daily Jackie Zamudio MD   17 g at 02/08/25 0848    prochlorperazine injection Soln 5 mg  5 mg Intravenous Q6H PRN Jackie Zamudio MD        ropivacaine 0.2% Perineural Pump infusion 500 ML   Perineural Continuous Vlad Walls MD   New Bag at 02/07/25 1241    senna-docusate 8.6-50 mg per tablet 1 tablet  1 tablet Oral BID Jackie Zamudio MD   1 tablet at 02/08/25 3275         Assessment:     Pain control adequate Continue SIFI PNC    Plan:     Patient doing well, continue present treatment.   Tylenol 1g q6 x8 doses then 1g q8   Celebrex 200mg  daily   Robaxin 500mg q6h   Plan to DC home with PNC, instructions given    Case still to be discussed with staff, Dr. Romero. Final recommendations per attestation above.       Thank you for your consult. We will follow up with the patient. Please call [phone] with any questions.    Slick Walls MD  Anesthesiology  PGY-3/CA-2

## 2025-02-09 NOTE — PLAN OF CARE
02/09/25 1113   Final Note   Assessment Type Final Discharge Note   Anticipated Discharge Disposition Home-Health   What phone number can be called within the next 1-3 days to see how you are doing after discharge? [phone]   Hospital Resources/Appts/Education Provided Provided patient/caregiver with written discharge plan information   Post-Acute Status   Post-Acute Authorization Home Health;HME   HME Status Referrals Sent   Home Health Status Referrals Sent   Coverage Blue cross/Blue shield   Discharge Delays None known at this time       Future Appointments   Date Time Provider Department Center   2/21/2025 12:45 PM Romario Larios, NP [facility] [address]   3/21/2025  1:30 PM Romario Larios, MAR[facility]    Patient is cleared to discharge home per CM once [facility] delivers RW bedside

## 2025-02-09 NOTE — NURSING
Pt  and c/g instructed copy of discharged papers instructions. Pt denies pain at this time. Pt educated on signs and symptoms of when to call the doctor. All belongings with the patient . Pt verbalized understanding. Wife is downstairs picking up his medication . Socia; worker came in and discuss workman's comp with patient . Patient received walker at bedside from [facility]. Patient received instruction form anesthesiology for PNC. This am .

## 2025-02-09 NOTE — PROGRESS NOTES
[facility] - Surgery  Discharge Final Note    Primary Care Provider: Sandrine Olivo DO    Expected Discharge Date: 2/9/2025    Final Discharge Note (most recent)       Final Note - 02/09/25 1113          Final Note    Assessment Type Final Discharge Note     Anticipated Discharge Disposition Home-Health Care Svc     What phone number can be called within the next 1-3 days to see how you are doing after discharge? [phone]     Hospital Resources/Appts/Education Provided Provided patient/caregiver with written discharge plan information        Post-Acute Status    Post-Acute Authorization Home Health;HME     HME Status Referrals Sent     Home Health Status Referrals Sent     Coverage Blue cross/Blue shield     Discharge Delays None known at this time                     Important Message from Medicare             Contact Info       *[facility] [place] [address], [address]  [facility] [address]   Phone: [phone]       Next Steps: Follow up on 2/13/2025    Instructions: HOME HEALTH- will come to your home on Thursday to intiate services    [facility]   Specialty: DME Provider, Orthotics    [address] [facility]  [address]  [facility] [address]   Phone: [phone]       Next Steps: Follow up    Instructions: DME- this is who you received your Rolling walker from, please feel free to contact them if any addtional information is needed    [facility] - Smoking [facility]ess[facility]   Specialty: Smoking Cessation    [address] [address]   Phone: [phone]       Next Steps: Follow up    Instructions: OUT PATIENT  SERVICES- this is a resource

## 2025-02-09 NOTE — PT/OT/SLP PROGRESS
"Physical Therapy   Treatment    Patient Name:  Juarez Keane   MRN:  [id number]    Recommendations:     Discharge Recommendations: Low Intensity Therapy  Discharge Equipment Recommendations: walker, rolling  Barriers to discharge: None    Assessment:     Juarez Keane is a 59 y.o. male admitted with a medical diagnosis of Closed displaced intertrochanteric fracture of left femur.  He presents with the following impairments/functional limitations: weakness, impaired endurance, decreased lower extremity function, impaired functional mobility, orthopedic precautions Patient progressed well with therapy, and introduction of therex this date. Politely declining curb step negotiation, but educated on appropriate techniques. Patient will continue to benefit from skilled PT during this admit to address BLE strength and endurance deficits, and maximize independence with functional mobility.    Rehab Prognosis: Good; patient would benefit from acute skilled PT services to address these deficits and reach maximum level of function.    Recent Surgery: Procedure(s) (LRB):  INSERTION, INTRAMEDULLARY ASHLEY, FEMUR (Left) 2 Days Post-Op    Plan:     During this hospitalization, patient to be seen  (daily (2/8 - 2/10), then 4x/week) to address the identified rehab impairments via gait training, therapeutic activities, therapeutic exercises, neuromuscular re-education and progress toward the following goals:    Plan of Care Expires:  03/10/25    Subjective     Chief Complaint: "It only hurts when I stand truly"  Patient/Family Comments/goals: return home to [facility]  Pain/Comfort:  Pain Rating 1: 2/10  Location - Side 1: Left  Location - Orientation 1: generalized  Location 1: leg  Pain Addressed 1: Pre-medicate for activity  Pain Rating Post-Intervention 1:  (increased with WB)      Objective:     Communicated with RN prior to session.  Patient found up in chair with perineural catheter upon PT entry to room.     General " Precautions: Standard, fall  Orthopedic Precautions: LLE weight bearing as tolerated (LLE ROMAT)  Braces: N/A  Respiratory Status: Room air     Functional Mobility:  Transfers:     Sit to Stand:  stand by assistance with rolling walker  Gait: ~120 ft with RW and SBA, and antalgic gait pattern noted. Cues for appropriate proximity to AD   Balance: sitting: good, standing: SBA      AM-PAC 6 CLICK MOBILITY  Turning over in bed (including adjusting bedclothes, sheets and blankets)?: 4  Sitting down on and standing up from a chair with arms (e.g., wheelchair, bedside commode, etc.): 4  Moving from lying on back to sitting on the side of the bed?: 4  Moving to and from a bed to a chair (including a wheelchair)?: 4  Need to walk in hospital room?: 4  Climbing 3-5 steps with a railing?: 3  Basic Mobility Total Score: 23       Treatment & Education:  Patient educated on calling for assistance for any needs to improve overall safety awareness.  All items placed within reach, and notified on call light usage for assistance with any needs for fall prevention.  Patient educated on current level of function and progression towards therapeutic goals.  Patient educated on importance of OOB activity to promote overall endurance.   Therex: handout with x5 SLR, AP, seated march, quad set, glute set    Patient left up in chair with all lines intact, call button in reach, and team present..    GOALS:   Multidisciplinary Problems       Physical Therapy Goals          Problem: Physical Therapy    Goal Priority Disciplines Outcome Interventions   Physical Therapy Goal     PT, PT/OT Progressing    Description: Goals to be met by: 25     Patient will increase functional independence with mobility by performin. Supine to sit with Modified [place]  2. Sit to supine with Modified [place]  3. Sit to stand transfer with Modified [place]  4. Bed to chair transfer with Modified [place] using Rolling Walker  5. Gait   x 150 feet with Modified [place] using Rolling Walker.   6. Ascend/Descend 6 inch curb step with Modified [place] using Rolling Walker.  7. Lower extremity exercise program x15 reps per handout, with assistance as needed                         DME Justifications:   Juarez Lo's mobility limitation cannot be sufficiently resolved by the use of a cane. His functional mobility deficit can be sufficiently resolved with the use of a Rolling Walker. Patient's mobility limitation significantly impairs their ability to participate in one of more activities of daily living.  The use of a RW will significantly improve the patient's ability to participate in MRADLS and the patient will use it on regular basis in the home.    Time Tracking:     PT Received On: 02/09/25  PT Start Time: 0818     PT Stop Time: 0842  PT Total Time (min): 24 min     Billable Minutes: Gait Training 14 and Therapeutic Exercise 10    Treatment Type: Treatment  PT/PTA: PT     Number of PTA visits since last PT visit: 0     02/09/2025

## 2025-02-09 NOTE — PLAN OF CARE
02/09/25 1039   Post-Acute Status   Post-Acute Authorization Home Health;HME   HME Status Referrals Sent  ([facility]   Home Health Status Referrals Sent  (Referral sent to [facility] for home health)   Discharge Plan   Discharge Plan A Home Health   Discharge Plan B Home

## 2025-02-09 NOTE — ASSESSMENT & PLAN NOTE
Patient's blood pressure range in the last 24 hours was: BP  Min: 111/58  Max: 142/65.The patient's inpatient anti-hypertensive regimen is listed below:  Current Antihypertensives  , Daily, Oral  chlorthalidone tablet 25 mg, Daily, Oral    Plan  - BP is controlled, no changes needed to their regimen. Resume home medication of Chlorthalidone on 2/8 as held on day of surgery on 2/7. Patient to continue Chlorthalidone on discharge to treat as taking previously.   - Goal BP < 140/90.

## 2025-02-09 NOTE — PLAN OF CARE
[facility]    HOME HEALTH ORDERS  FACE TO FACE ENCOUNTER    Patient Name: Juarez Keane  YOB: 1965    PCP: Sandrine Olivo DO   PCP Address: [id number] [place] [address] / [id number]  PCP Phone Number: [phone]  PCP Fax: [phone]    Encounter Date: 02/09/2025    Discharging Team: [facility] [place] [facility]    Admit to Home Health    Diagnoses:  Active Hospital Problems    Diagnosis  POA    *Closed displaced intertrochanteric fracture of left femur s/p IM nail on 2/7/2025 [S72.142A]  Yes     Priority: 1 - High    Essential hypertension [I10]  Yes     Priority: 2      Amlodipine 5 and losartan 50  Notes + arthralgia, [place]; LE edema with amlodipine, previously on 10 but improved LE edema with 5mg and adding losartan  Pt would like to try to get off this and try alternative instead   + fatigue and arthralgia still feels since starting amlodipine   BP is well controlled but wants alternative   Will try Benicar 20; monitor BP at home and f/u in 2 weeks       Hypokalemia [E87.6]  Yes     Priority: 3     Prediabetes [R73.03]  Yes     Priority: 4       Resolved Hospital Problems   No resolved problems to display.       Future Appointments   Date Time Provider Department Center   2/21/2025 12:45 PM Ric, Romario K, NP [facility] [address]   3/21/2025  1:30 PM Ric, Romario K, NP [facility] [address]         I have seen and examined this patient face to face today. My clinical findings that support the need for the home health skilled services and home bound status are the following:  Weakness/numbness causing balance and gait disturbance due to hip fracture and Weakness/Debility making it taxing to leave home.  Requiring assistive device to leave home due to unsteady gait caused by hip fracture and Weakness/Debility.    Allergies:  Review of patient's allergies indicates:   Allergen Reactions    Sulfa (sulfonamide antibiotics)        Diet: regular diet    Activities:  activity as tolerated    Weight bearing: Weight bear as tolerated to left lower extremity     When multiple disciplines ordered:     Start of Care occurs on Sunday - Wednesday schedule remaining discipline evaluations as ordered on separate consecutive days following the start of care.     Thursday SOC -schedule subsequent evaluations Friday and Monday the following week.      Friday - Saturday SOC - schedule subsequent discipline evaluations on consecutive days starting Monday of the following week.     For all post-discharge communication and subsequent orders please contact patient's primary care physician. If unable to reach primary care physician or do not receive response within 30 minutes, please contact [facility] On Call Nurse for clinical staff order clarification.      CONSULTS:    Physical Therapy to evaluate and treat. Evaluate for home safety and equipment needs; Establish/upgrade home exercise program. Perform / instruct on therapeutic exercises, gait training, transfer training, and Range of Motion.  Occupational Therapy to evaluate and treat. Evaluate home environment for safety and equipment needs. Perform/Instruct on transfers, ADL training, ROM, and therapeutic exercises.      WOUND CARE ORDERS  Keep surgical dressings in place that was applied post-op and leave on left hip and thigh area and do not remove until Orthopedic clinic follow-up. Assess surgical dressing with each treatment. Call MD if any drainage reaches border to border of dressing horizontally, signs or symptoms of infection, temp >101 F, induration, swelling or redness.      Medications: Review discharge medications with patient and family and provide education.         Medication List        START taking these medications      acetaminophen 500 MG tablet  Commonly known as: TYLENOL  Take 2 tablets (1,000 mg total) by mouth every 8 (eight) hours as needed for Pain.     apixaban 2.5 mg Tab  Commonly known as: ELIQUIS  Take 1 tablet  (2.5 mg total) by mouth 2 (two) times daily.     celecoxib 200 MG capsule  Commonly known as: CeleBREX  Take 1 capsule (200 mg total) by mouth once daily. for 14 days  Start taking on: February 10, 2025     methocarbamoL 500 MG Tab  Commonly known as: Robaxin  Take 1 tablet (500 mg total) by mouth every 6 (six) hours. for 14 days     oxyCODONE 5 MG immediate release tablet  Commonly known as: ROXICODONE  Take 1 tablet (5 mg total) by mouth every 4 (four) hours as needed for Pain.            CONTINUE taking these medications      chlorthalidone 25 MG Tab  Commonly known as: HYGROTEN  Take 25 mg by mouth once daily.            STOP taking these medications      cromolyn 5.2 mg/spray (4 %) nasal spray  Commonly known as: NASALCHROM     levocetirizine 5 MG tablet  Commonly known as: XYZAL                I certify that this patient is confined to his home and needs physical therapy and occupational therapy.      LEESA LUCAS MD  Attending Staff Physician   [facility]  Pager: [phone]  Spectralink: [phone]

## 2025-02-09 NOTE — PT/OT/SLP PROGRESS
"Occupational Therapy   Treatment    Name: Juarez Keane  MRN: [id number]  Admitting Diagnosis:  Closed displaced intertrochanteric fracture of left femur  2 Days Post-Op    Recommendations:     Discharge Recommendations: Low Intensity Therapy  Discharge Equipment Recommendations:  walker, rolling, bedside commode  Barriers to discharge:  None    Assessment:     Juarez Keane is a 59 y.o. male with a medical diagnosis of Closed displaced intertrochanteric fracture of left femur. Performance deficits affecting function are weakness, impaired endurance, impaired functional mobility, gait instability, impaired balance, decreased lower extremity function, pain, orthopedic precautions. Pt agreeable to therapy and tolerated well. Pt reporting dressing himself with Min A for LLE in AM and able to perform toileting and grooming with SPV from wife. Pt remains limited in ADLs, functional mobility, and functional transfers.  Patient continues to demonstrate the need for low intensity therapy on a scheduled basis exhibited by decreased independence with self-care and functional mobility    Rehab Prognosis:  Good; patient would benefit from acute skilled OT services to address these deficits and reach maximum level of function.       Plan:     Patient to be seen daily to address the above listed problems via community/work re-entry, self-care/home management, therapeutic activities, therapeutic exercises, neuromuscular re-education  Plan of Care Expires: 03/10/25  Plan of Care Reviewed with: patient, spouse    Subjective     Chief Complaint: none  Patient/Family Comments/goals: "I feel better than yesterday."  Pain/Comfort:  Pain Rating Post-Intervention 1: 0/10    Objective:     Communicated with: Nurse prior to session.  Patient found up in chair with perineural catheter upon OT entry to room.    General Precautions: Standard, fall    Orthopedic Precautions:LLE weight bearing as tolerated  Braces: N/A  Respiratory " Status: Room air     Occupational Performance:     Bed Mobility:    N/A 2/2 UIC upon OT arrival     Functional Mobility/Transfers:  Patient completed Sit <> Stand Transfer with stand by assistance  with  rolling walker   Patient completed Toilet Transfer Step Transfer technique with stand by assistance with  rolling walker  Functional Mobility: Pt engaged in functional mobility throughout hospital room to toilet ~ 10  ft and hallway ~ 100 ft with RW and SBA to maximize functional endurance and standing balance required for home/community mobility and occupational engagement.     Activities of Daily Living:  Pt reporting performing ADLs in morning with wife present SPV assistance      [facility] 6 Click ADL: 20    Treatment & Education:  -Education on energy conservation and task modification to maximize safety and (I) during ADLs and mobility  -Education on importance of OOB activity to improve overall activity tolerance and promote recovery  -Pt educated to call for assistance and to transfer with hospital staff only  -Education on LLE WBAT precautions. Pt able to recall precautions without verbal cues  -Provided education regarding role of OT, POC, & discharge recommendations with pt and wife verbalizing understanding.  Pt had no further questions & when asked whether there were any concerns pt reported none.      Patient left up in chair with all lines intact, call button in reach, and nurse notified    GOALS:   Multidisciplinary Problems       Occupational Therapy Goals          Problem: Occupational Therapy    Goal Priority Disciplines Outcome Interventions   Occupational Therapy Goal     OT, PT/OT Progressing    Description: Goals to be met by: 3/10/25     Patient will increase functional independence with ADLs by performing:    LE Dressing with Modified [place].  Grooming while standing at sink with Modified [place].  Toileting from toilet/bedside commode with Modified [place] for hygiene and  clothing management.   Supine to sit with Modified [place].  Toilet transfer to toilet/bedside commode with Modified [place] and LRAD.                         DME Justifications:   Juarez Lo's mobility limitation cannot be sufficiently resolved by the use of a cane. His functional mobility deficit can be sufficiently resolved with the use of a Rolling Walker. Patient's mobility limitation significantly impairs their ability to participate in one of more activities of daily living.  The use of a RW will significantly improve the patient's ability to participate in MRADLS and the patient will use it on regular basis in the home.    Time Tracking:     OT Date of Treatment: 02/09/25  OT Start Time: 1147  OT Stop Time: 1156  OT Total Time (min): 9 min    Billable Minutes:Therapeutic Activity 9 min    OT/ZOFIA: OT          2/9/2025

## 2025-02-09 NOTE — ASSESSMENT & PLAN NOTE
Patient admitted after slip and fall at work with closed displaced left intertrochanteric fracture. Orthopedic surgery consulted and recommending operative repair of fracture.   - Patient taken to OR on 2/7/2025 and underwent left femur cephalomedullary nail fixation by Dr. Willie Taylor.   - Post-op patient weight bear as tolerated and range of motion as tolerated to the left lower extremity as per Orthopedics recommendation and to continue on discharge.   - Patient placed on Apixiban 2.5 mg po BID and ANNMARIE/SCD's for DVT prophylaxis post-op and will need for total of 30 days. Patient discharged on 30 days of Apixiban for DVT prophylaxis.   - Vitamin D level normal at 30 on admit.   - Perineural pain catheter placed by Anesthesia Pain Service with continuous infusion of Ropivacaine to help with pain control post-op and Anesthesia Pain Service managing while patient in the hospital. Appreciate Anesthesia assistance. Patient discharged with perineural catheter in place and patient and wife instructed on removal at home by Anesthesia.   - Patient placed on multimodal pain management post-op with Tylenol 1000 mg po every 8 hours prn mild pain, Robaxin 500 mg po every 6 hours, and Celebrex 200 mg po daily post-op. Pain well controlled at present to left hip. Patient discharged on above pain regimen and also discharged on oxycodone 5 mg po every 4 hours prn for moderate to severe breakthrough pain.   - PT/OT consulted post-op. Patient progressing very well with PT/OT in hospital and recommended low intensity on discharge and plan HH PT/OT and rolling walker on discharge. Pain well controlled to left hip post-op. On day of discharge on 2/9, patient ambulated ~120 feet with rolling walker and stand by assist. Anesthesia discussed with patient and wife about perineural catheter and patient to discharge with perineural catheter in place and patient and instructed by Anesthesia on removal of catheter. Patient's Hgb  stable post-op at 11.4 on day of discharge. Hypokalemia resolved on discharge. Vitals signs stable and controlled on day of discharge. Patient and wife instructed not to remove surgical bandages and they are to remain in place until Orthopedic clinic follow-up. Patient discharged home in good condition with HH PT./OT and rolling walker on 2/9.   - X-ray AP pelvis and left femur AP lateral views at subsequent follow-ups: Follow-up postop 2 weeks, 6 weeks, 3 months, 6 months, 1 year.

## 2025-02-09 NOTE — ASSESSMENT & PLAN NOTE
Resolved on discharge. Potassium 3.5 on discharge. Patient and wife instructed on high potassium foods for his diet as would prefer dietary intervention instead of taking potassium supplement. Potassium low at 2.9 on admit and related to Chlorthalidone use. Replaced with oral potassium. Patient's most recent potassium results are listed below.   Recent Labs     02/06/25  1839 02/09/25  0328   K 2.9* 3.5       Plan  - Replete potassium per protocol  - Magnesium normal at 1.9 so no replacement needed.

## 2025-02-09 NOTE — SUBJECTIVE & OBJECTIVE
"Principal Problem:Closed displaced intertrochanteric fracture of left femur    Principal Orthopedic Problem: s/p L IT IMN 2/7     Interval History: Patient seen and examined, sitting up in chair. NAEON. Patient doing well. Patient remained HDS, afebrile overnight . No complaints. Pain well controlled. Worked with PT yesterday, did well. Patient ambulated to restroom multiple times overnight. Patient states he feels good and ready to go home.       Review of patient's allergies indicates:   Allergen Reactions    Sulfa (sulfonamide antibiotics)        Current Facility-Administered Medications   Medication    apixaban tablet 2.5 mg    bisacodyL suppository 10 mg    celecoxib capsule 200 mg    chlorthalidone tablet 25 mg    melatonin tablet 6 mg    methocarbamoL tablet 500 mg    mupirocin 2 % ointment 1 g    ondansetron injection 4 mg    polyethylene glycol packet 17 g    prochlorperazine injection Soln 5 mg    ropivacaine 0.2% Perineural Pump infusion 500 ML    senna-docusate 8.6-50 mg per tablet 1 tablet     Objective:     Vital Signs (Most Recent):  Temp: 98.6 °F (37 °C) (02/09/25 0751)  Pulse: 83 (02/09/25 0751)  Resp: 18 (02/09/25 0751)  BP: (!) 142/65 (02/09/25 0751)  SpO2: 99 % (02/09/25 0751) Vital Signs (24h Range):  Temp:  [97.9 °F (36.6 °C)-98.6 °F (37 °C)] 98.6 °F (37 °C)  Pulse:  [69-92] 83  Resp:  [16-18] 18  SpO2:  [97 %-99 %] 99 %  BP: (111-142)/(56-70) 142/65     Weight: 79.4 kg (175 lb)  Height: 5' 9.09" (175.5 cm)  Body mass index is 25.77 kg/m².      Intake/Output Summary (Last 24 hours) at 2/9/2025 0885  Last data filed at 2/9/2025 0610  Gross per 24 hour   Intake 1880 ml   Output 450 ml   Net 1430 ml        Ortho/SPM Exam   AAOx4  NAD  Reg rate  No increased WOB    LLE:  Dressing c/d/i  SILT T/SP/DP/[place]/Sa  Motor intact T/SP/DP  WWP extremities  FCDs in place and functioning   Significant Labs: All pertinent labs within the past 24 hours have been reviewed.    Significant Imaging: I have reviewed " all pertinent imaging results/findings.

## 2025-02-09 NOTE — DISCHARGE SUMMARY
[facility] Medicine  Discharge Summary      Patient Name: Juarez Keane  MRN: [id number]  MIR: [id number]  Patient Class: IP- Inpatient  Admission Date: 2/7/2025  Hospital Length of Stay: 2 days  Discharge Date and Time: 2/9/2025  2:00 PM  Attending Physician: Jackie Zamudio MD   Discharging Provider: Jackie Zamudio MD  Primary Care Provider: Sandrine Olivo DO  [facility] Medicine Team: [facility] HOSP MED  Jackie Zamudio MD  Primary Care Team: [facility] MED     HPI:   59 y.o.male with essential hypertension, GERD, history of optic neuritis to right eye presented to the [facility] ER as transfer from [facility] ED with complaint of left hip pain. Patient states pain is sharp/stabbing in the left groin, upper leg and is aggravated by any weight bearing. He reports onset of symptoms was 1 day(s) after a fall after ambulating at work. Patient reports that he was at work yesterday and was walking up a gravel ramp and slipped in the gravel and fell on his left hip and upper leg area. After fall patient was able to sit up but could not stand up or bear weight to his left leg due to pain that he described as 10/10 pain to left groin and upper thigh area. Patient was transported to [facility] ED and he had X-rays done that revealed a closed displaced left intertrochanteric fracture. Ortho at [place] consulted and recommended transfer to [facility] for Ortho intervention at [facility] so patient was transferred from ED to ED early this am. Patient currently rates his pain as 4/10 to left hip but just received IV Toradol and IV Dilaudid. Wife at bedside in ED. Ortho evaluated patient in ED and planning on taking to OR this am to repair left hip fracture. Patient denies head trauma. Patient denies to loss of consciousness, denies syncope, denies chest pain, denies dizziness prior or after fall. X-ray obtained in ER revealed left intertrochanteric hip fracture.   Prior to admission  patient's functional mobility was independent with no assistive device for home and community distances. Patient does not have history of gait or balance problems. Patient does not have history of previous falls or fractures. Patient does not have previous cardiac history such as MI or CAD. Patient does not have previous history of TIA or stroke. Patient does not have previous history of compensated heart failure. Patient does not have history of diabetes. Patient does not have history of advanced kidney disease with creatinine > 2. Patient currently lives with their spouse.  Labs reviewed. Potassium low at 2.9 and likely related to diuretic he takes for his HTN. Patient ordered to receive Potassium rider 10 mEq x 2 in ED to replace. Hgb 14.7. Creatinine normal at 0.9. LFT's normal. HgA1C 5.8% and mildly elevated. Vitamin D normal at 30. Magnesium normal at 1.9. INR normal at 1.0. EKG reviewed my myself and normal sinus rhythm with no ischemic changes. CXR reviewed by myself and normal with no infiltrates or masses or edema.   Patient reports only prescription medication he is currently taking is Chlorthalidone 25 mg po daily that he was recently prescribed for his HTN.       2/7/2025  Procedure(s) (LRB):  INSERTION, INTRAMEDULLARY ASHLEY, FEMUR (Left)    Surgeon(s):  Willie Taylor MD Cignetti, Carly A, MD      Hospital Course:   Patient admitted to [facility] Medicine Team H: Hip Fracture team and started on Hip Fracture Pathway with Orthopedic surgery consult for closed left intertrochanteric femur fracture. Patient was seen and evaluated by Orthopedic surgery who recommended operative repair of hip fracture. Patient was medically optimized prior to surgery and was taken to OR after optimization on 2/7/2025. Patient underwent left femur cephalomedullary nail fixation by Dr. Willie Taylor. Post-op patient weight bear as tolerated and range of motion as tolerated to the left lower extremity as per  Orthopedics recommendation. Patient placed on Apixiban 2.5 mg po BID and ANNMARIE/SCD's for DVT prophylaxis post-op and will need for total of 30 days. Perineural pain catheter placed by Anesthesia Pain Service with continuous infusion of Ropivacaine to help with pain control post-op and Anesthesia Pain Service managing while patient in the hospital. Patient placed on multimodal pain management post-op with Tylenol 1000 mg po every 6 hours, Robaxin 500 mg po every 6 hours, and Celebrex 200 mg po daily post-op and will continue. PT/OT consulted post-op. X-ray AP pelvis and left femur AP lateral views at subsequent follow-ups: Follow-up postop 2 weeks, 6 weeks, 3 months, 6 months, 1 year. Patient progressing very well with PT/OT in hospital and recommended low intensity on discharge and plan HH PT/OT and rolling walker on discharge. Pain well controlled to left hip post-op. On day of discharge on 2/9, patient ambulated ~120 feet with rolling walker and stand by assist. Anesthesia discussed with patient and wife about perineural catheter and patient to discharge with perineural catheter in place and patient and instructed by Anesthesia on removal of catheter. Patient's Hgb stable post-op at 11.4 on day of discharge. Hypokalemia resolved on discharge. Vitals signs stable and controlled on day of discharge. Patient and wife instructed not to remove surgical bandages and they are to remain in place until Orthopedic clinic follow-up. Patient discharged home in good condition with HH PT./OT and rolling walker on 2/9.      Goals of Care Treatment Preferences:  Code Status: Full Code      SDOH Screening:  The patient was screened for utility difficulties, food insecurity, transport difficulties, housing insecurity, and interpersonal safety and there were no concerns identified this admission.     Consults:     * Closed displaced intertrochanteric fracture of left femur s/p IM nail on 2/7/2025  Patient admitted after slip and fall  at work with closed displaced left intertrochanteric fracture. Orthopedic surgery consulted and recommending operative repair of fracture.   - Patient taken to OR on 2/7/2025 and underwent left femur cephalomedullary nail fixation by Dr. Willie Taylor.   - Post-op patient weight bear as tolerated and range of motion as tolerated to the left lower extremity as per Orthopedics recommendation and to continue on discharge.   - Patient placed on Apixiban 2.5 mg po BID and ANNMARIE/SCD's for DVT prophylaxis post-op and will need for total of 30 days. Patient discharged on 30 days of Apixiban for DVT prophylaxis.   - Vitamin D level normal at 30 on admit.   - Perineural pain catheter placed by Anesthesia Pain Service with continuous infusion of Ropivacaine to help with pain control post-op and Anesthesia Pain Service managing while patient in the hospital. Appreciate Anesthesia assistance. Patient discharged with perineural catheter in place and patient and wife instructed on removal at home by Anesthesia.   - Patient placed on multimodal pain management post-op with Tylenol 1000 mg po every 8 hours prn mild pain, Robaxin 500 mg po every 6 hours, and Celebrex 200 mg po daily post-op. Pain well controlled at present to left hip. Patient discharged on above pain regimen and also discharged on oxycodone 5 mg po every 4 hours prn for moderate to severe breakthrough pain.   - PT/OT consulted post-op. Patient progressing very well with PT/OT in hospital and recommended low intensity on discharge and plan HH PT/OT and rolling walker on discharge. Pain well controlled to left hip post-op. On day of discharge on 2/9, patient ambulated ~120 feet with rolling walker and stand by assist. Anesthesia discussed with patient and wife about perineural catheter and patient to discharge with perineural catheter in place and patient and instructed by Anesthesia on removal of catheter. Patient's Hgb stable post-op at 11.4 on day of  discharge. Hypokalemia resolved on discharge. Vitals signs stable and controlled on day of discharge. Patient and wife instructed not to remove surgical bandages and they are to remain in place until Orthopedic clinic follow-up. Patient discharged home in good condition with HH PT./OT and rolling walker on 2/9.   - X-ray AP pelvis and left femur AP lateral views at subsequent follow-ups: Follow-up postop 2 weeks, 6 weeks, 3 months, 6 months, 1 year.     Essential hypertension  Patient's blood pressure range in the last 24 hours was: BP  Min: 111/58  Max: 142/65.The patient's inpatient anti-hypertensive regimen is listed below:  Current Antihypertensives  , Daily, Oral  chlorthalidone tablet 25 mg, Daily, Oral    Plan  - BP is controlled, no changes needed to their regimen. Resume home medication of Chlorthalidone on 2/8 as held on day of surgery on 2/7. Patient to continue Chlorthalidone on discharge to treat as taking previously.   - Goal BP < 140/90.     Hypokalemia  Resolved on discharge. Potassium 3.5 on discharge. Patient and wife instructed on high potassium foods for his diet as would prefer dietary intervention instead of taking potassium supplement. Potassium low at 2.9 on admit and related to Chlorthalidone use. Replaced with oral potassium. Patient's most recent potassium results are listed below.   Recent Labs     02/06/25  1839 02/09/25  0328   K 2.9* 3.5       Plan  - Replete potassium per protocol  - Magnesium normal at 1.9 so no replacement needed.     Prediabetes  Patient with screening HgA1C of 5.8% on admit consistent with prediabetes. This is a new diagnosis. Will refer back to his primary care physician on discharge for further management. No glucose monitoring needed in hospital.         Final Active Diagnoses:    Diagnosis Date Noted POA    PRINCIPAL PROBLEM:  Closed displaced intertrochanteric fracture of left femur s/p IM nail on 2/7/2025 [S72.142A] 02/07/2025 Yes    Essential hypertension  "[I10] 07/21/2022 Yes    Hypokalemia [E87.6] 02/07/2025 Yes    Prediabetes [R73.03] 02/07/2025 Yes      Problems Resolved During this Admission:       Discharged Condition: good    Disposition: Home-Health Care Svc    Follow Up:  Future Appointments   Date Time Provider Department Center   2/21/2025 12:45 PM Romario Larios, NP [facility]   3/21/2025  1:30 PM Romario Larios, NP [facility]        Follow-up Information       [facility] Follow up on 2/13/2025.    Why: HOME HEALTH- will come to your home on Thursday to intiate services  Contact information:  [facility] [address], [address]  [facility] [address]  [phone]             [facility], [facility] Follow up.    Specialties: DME Provider, Orthotics  Why: DME- this is who you received your Rolling walker from, please feel free to contact them if any addtional information is needed  Contact information:  [address] [facility] [address]  [facility] [address]  [phone]               [facility] - Smoking Cessation Follow up.    Specialty: Smoking Cessation  Why: OUT PATIENT  SERVICES- this is a resource  Contact information:  [address] [facility] [address] [phone]  Additional information:  Use [address] Entrance and check in at [address]                         Patient Instructions:      WALKER FOR HOME USE     Order Specific Question Answer Comments   Type of Walker: Adult (5'4"-6'6")    With wheels? Yes    Height: 5' 9.09" (1.755 m)    Weight: 79.4 kg (175 lb)    Length of need (1-99 months): 99    Does patient have medical equipment at home? bath bench    Please check all that apply: Patient's condition impairs ambulation.    Please check all that apply: Patient is unable to safely ambulate without equipment.    Please check all that apply: Walker will be used for gait training.      Ambulatory referral/consult to Orthopedics Fracture Care   Standing Status: Future   Referral " Priority: Routine Referral Type: Consultation   Requested Specialty: Orthopedic Surgery   Number of Visits Requested: 1     Diet Adult Regular     Notify your health care provider if you experience any of the following:  temperature >100.4     Notify your health care provider if you experience any of the following:  persistent nausea and vomiting or diarrhea     Notify your health care provider if you experience any of the following:  severe uncontrolled pain     Notify your health care provider if you experience any of the following:  redness, tenderness, or signs of infection (pain, swelling, redness, odor or green/yellow discharge around incision site)     Notify your health care provider if you experience any of the following:  difficulty breathing or increased cough     Notify your health care provider if you experience any of the following:  severe persistent headache     Notify your health care provider if you experience any of the following:  worsening rash     Notify your health care provider if you experience any of the following:  persistent dizziness, light-headedness, or visual disturbances     Notify your health care provider if you experience any of the following:  increased confusion or weakness     Sponge bath only until clinic visit     Leave dressing on - Keep it clean, dry, and intact until clinic visit     Weight bearing restrictions (specify):   Order Comments: Weight bear as tolerated to left lower extremity       Significant Diagnostic Studies: Labs: BMP:   Recent Labs   Lab 02/09/25  0328         K 3.5      CO2 30*   BUN 13   CREATININE 0.8   CALCIUM 8.3*    and CMP   Recent Labs   Lab 02/09/25  0328      K 3.5      CO2 30*      BUN 13   CREATININE 0.8   CALCIUM 8.3*   ANIONGAP 10     Lab Results   Component Value Date    [id number] 30 02/06/2025       Pending Diagnostic Studies:       None           Medications:  Reconciled Home Medications:       Medication List        START taking these medications      acetaminophen 500 MG tablet  Commonly known as: TYLENOL  Take 2 tablets (1,000 mg total) by mouth every 8 (eight) hours as needed for Pain.     celecoxib 200 MG capsule  Commonly known as: CeleBREX  Take 1 capsule (200 mg total) by mouth once daily. for 14 days  Start taking on: February 10, 2025     ELIQUIS 2.5 mg Tab  Generic drug: apixaban  Take 1 tablet (2.5 mg total) by mouth 2 (two) times daily.     methocarbamoL 500 MG Tab  Commonly known as: Robaxin  Take 1 tablet (500 mg total) by mouth every 6 (six) hours. for 14 days     oxyCODONE 5 MG immediate release tablet  Commonly known as: ROXICODONE  Take 1 tablet (5 mg total) by mouth every 4 (four) hours as needed for Pain.            CONTINUE taking these medications      chlorthalidone 25 MG Tab  Commonly known as: HYGROTEN  Take 25 mg by mouth once daily.            STOP taking these medications      cromolyn 5.2 mg/spray (4 %) nasal spray  Commonly known as: NASALCHROM     levocetirizine 5 MG tablet  Commonly known as: XYZAL              Indwelling Lines/Drains at time of discharge:   Lines/Drains/Airways       Epidural Line  Duration                  Perineural Analgesia/Anesthesia Assessment (Motor Function-Bromage) 02/07/25 1011 2 days                    32 minutes of time spent on discharge, including examining the patient, providing discharge instructions, arranging follow-up and documentation.          Jackie Zamudio MD  Department of [facility] Medicine  [facility] - Surgery

## 2025-02-09 NOTE — ASSESSMENT & PLAN NOTE
59M sp fall 2/6/25 with left intertrochanteric femur fx    -sp IMN L femur 2/7/25 with Dr Taylor    Multimodal pain regimen minimizing narcotic use. PNC per APS  WBAT BLE   PT/OT  DVT ppx w SCDs, eliquis 2.5 bid x 30d  FU 2 wks post op with ortho trauma MIKE: scheduled for 2/21/25 12:45pm (patient will be contacted with details)    »Dispo: patient doing well, ok to dc from an ortho standpoint     » Dispo: DC when medically ready

## 2025-02-09 NOTE — PROGRESS NOTES
"[facility]  Orthopedics  Progress Note    Patient Name: Juarez Keane  MRN: [id number]  Admission Date: 2/7/2025  Hospital Length of Stay: 2 days  Attending Provider: Jackie Zamudio MD  Primary Care Provider: Sandrine Olivo DO  Follow-up For: Procedure(s) (LRB):  INSERTION, INTRAMEDULLARY ASHLEY, FEMUR (Left)    Post-Operative Day: 2 Days Post-Op  Subjective:     Principal Problem:Closed displaced intertrochanteric fracture of left femur    Principal Orthopedic Problem: s/p L IT IMN 2/7     Interval History: Patient seen and examined, sitting up in chair. NAEON. Patient doing well. Patient remained HDS, afebrile overnight . No complaints. Pain well controlled. Worked with PT yesterday, did well. Patient ambulated to restroom multiple times overnight. Patient states he feels good and ready to go home.       Review of patient's allergies indicates:   Allergen Reactions    Sulfa (sulfonamide antibiotics)        Current Facility-Administered Medications   Medication    apixaban tablet 2.5 mg    bisacodyL suppository 10 mg    celecoxib capsule 200 mg    chlorthalidone tablet 25 mg    melatonin tablet 6 mg    methocarbamoL tablet 500 mg    mupirocin 2 % ointment 1 g    ondansetron injection 4 mg    polyethylene glycol packet 17 g    prochlorperazine injection Soln 5 mg    ropivacaine 0.2% Perineural Pump infusion 500 ML    senna-docusate 8.6-50 mg per tablet 1 tablet     Objective:     Vital Signs (Most Recent):  Temp: 98.6 °F (37 °C) (02/09/25 0751)  Pulse: 83 (02/09/25 0751)  Resp: 18 (02/09/25 0751)  BP: (!) 142/65 (02/09/25 0751)  SpO2: 99 % (02/09/25 0751) Vital Signs (24h Range):  Temp:  [97.9 °F (36.6 °C)-98.6 °F (37 °C)] 98.6 °F (37 °C)  Pulse:  [69-92] 83  Resp:  [16-18] 18  SpO2:  [97 %-99 %] 99 %  BP: (111-142)/(56-70) 142/65     Weight: 79.4 kg (175 lb)  Height: 5' 9.09" (175.5 cm)  Body mass index is 25.77 kg/m².      Intake/Output Summary (Last 24 hours) at 2/9/2025 0837  Last data filed " at 2/9/2025 0610  Gross per 24 hour   Intake 1880 ml   Output 450 ml   Net 1430 ml        Ortho/SPM Exam   AAOx4  NAD  Reg rate  No increased WOB    LLE:  Dressing c/d/i  SILT T/SP/DP/Morfin/Sa  Motor intact T/SP/DP  WWP extremities  FCDs in place and functioning   Significant Labs: All pertinent labs within the past 24 hours have been reviewed.    Significant Imaging: I have reviewed all pertinent imaging results/findings.  Assessment/Plan:     * Closed displaced intertrochanteric fracture of left femur s/p IM nail on 2/7/2025  59M sp fall 2/6/25 with left intertrochanteric femur fx    -sp IMN L femur 2/7/25 with Dr Taylor    Multimodal pain regimen minimizing narcotic use. PNC per APS  WBAT BLE   PT/OT  DVT ppx w SCDs, eliquis 2.5 bid x 30d  FU 2 wks post op with ortho trauma MIKE: scheduled for 2/21/25 12:45pm (patient will be contacted with details)    »Dispo: patient doing well, ok to dc from an ortho standpoint       Rama Iglesias MD  Orthopedics  [facility] - Surgery

## 2025-02-10 ENCOUNTER — TELEPHONE (OUTPATIENT)
Dept: ORTHOPEDICS | Facility: CLINIC | Age: 60
End: 2025-02-10
Payer: COMMERCIAL

## 2025-02-10 ENCOUNTER — PATIENT OUTREACH (OUTPATIENT)
Dept: ADMINISTRATIVE | Facility: CLINIC | Age: 60
End: 2025-02-10
Payer: COMMERCIAL

## 2025-02-10 LAB
GLUCOSE SERPL-MCNC: 146 MG/DL (ref 70–110)
HCO3 UR-SCNC: 30.5 MMOL/L (ref 24–28)
HCT VFR BLD CALC: 41 %PCV (ref 36–54)
OHS QRS DURATION: 90 MS
OHS QTC CALCULATION: 453 MS
PCO2 BLDA: 57.8 MMHG (ref 35–45)
PH SMN: 7.33 [PH] (ref 7.35–7.45)
PO2 BLDA: 40 MMHG (ref 40–60)
POC BE: 5 MMOL/L
POC IONIZED CALCIUM: 1.06 MMOL/L (ref 1.06–1.42)
POC SATURATED O2: 69 % (ref 95–100)
POC TCO2: 32 MMOL/L (ref 24–29)
POTASSIUM BLD-SCNC: 3.6 MMOL/L (ref 3.5–5.1)
SAMPLE: ABNORMAL
SODIUM BLD-SCNC: 136 MMOL/L (ref 136–145)

## 2025-02-10 NOTE — PROGRESS NOTES
C3 nurse spoke with Juarez Keane for a TCC post hospital discharge follow up call. D/T mobility problems, per pt. request & consent C3 nurse sched. HOSFU visit per Home NP visit in [facility] with Sandi Art on 02/20/25.  Pt.  understands that the Provider/staff will call the day prior to sched. HOSPFU to confirm date/time.

## 2025-02-13 NOTE — ANESTHESIA POSTPROCEDURE EVALUATION
Anesthesia Post Evaluation    Patient: Juarez Keane    Procedure(s) Performed: Procedure(s) (LRB):  INSERTION, INTRAMEDULLARY ASHLEY, FEMUR (Left)    Final Anesthesia Type: general      Patient location during evaluation: PACU  Patient participation: Yes- Able to Participate  Level of consciousness: awake and alert  Post-procedure vital signs: reviewed and stable  Pain management: adequate  Airway patency: patent    PONV status at discharge: No PONV  Anesthetic complications: no      Cardiovascular status: stable  Respiratory status: unassisted and spontaneous ventilation  Hydration status: euvolemic  Follow-up not needed.              Vitals Value Taken Time   /64 02/09/25 1108   Temp 36.9 °C (98.5 °F) 02/09/25 1108   Pulse 80 02/09/25 1108   Resp 17 02/09/25 1108   SpO2 100 % 02/09/25 1108         Event Time   Out of Recovery 02/07/2025 12:45:00         Pain/Day Score: No data recorded

## 2025-02-20 ENCOUNTER — OFFICE VISIT (OUTPATIENT)
Dept: HOME HEALTH SERVICES | Facility: CLINIC | Age: 60
End: 2025-02-20
Payer: COMMERCIAL

## 2025-02-20 VITALS
OXYGEN SATURATION: 97 % | RESPIRATION RATE: 16 BRPM | HEART RATE: 70 BPM | DIASTOLIC BLOOD PRESSURE: 68 MMHG | TEMPERATURE: 97 F | SYSTOLIC BLOOD PRESSURE: 124 MMHG

## 2025-02-20 DIAGNOSIS — S72.142D CLOSED DISPLACED INTERTROCHANTERIC FRACTURE OF LEFT FEMUR WITH ROUTINE HEALING, SUBSEQUENT ENCOUNTER: ICD-10-CM

## 2025-02-20 DIAGNOSIS — I10 ESSENTIAL HYPERTENSION: Primary | ICD-10-CM

## 2025-02-20 NOTE — PROGRESS NOTES
[facility]  Transitional Care Management (TCM) Home Visit    Encounter Provider: Sandi Art   PCP: Sandrine Olivo DO  Consult Requested By: No ref. provider found  Admit Date: 2/7/25   IP Discharge Date: 2/9/25  Hospital Length of Stay: 2 days  Days since discharge (from IP or SNF): 11  [facility] Contact Note: 2/10/25  Hospital Diagnosis: No admission diagnoses are documented for this encounter.     HISTORY OF PRESENT ILLNESS      Patient ID: Juarez Keane is a 59 y.o. male was recently admitted to the hospital, this is their TCM encounter.    Hospital Course Synopsis:  2/7/2025  Procedure(s) (LRB):  INSERTION, INTRAMEDULLARY ASHLEY, FEMUR (Left)    Surgeon(s):  Willie Taylor MD Cignetti, Carly A, MD        Hospital Course:   Patient admitted to [facility] Medicine Team H: Hip Fracture team and started on Hip Fracture Pathway with Orthopedic surgery consult for closed left intertrochanteric femur fracture. Patient was seen and evaluated by Orthopedic surgery who recommended operative repair of hip fracture. Patient was medically optimized prior to surgery and was taken to OR after optimization on 2/7/2025. Patient underwent left femur cephalomedullary nail fixation by Dr. Willie Taylor. Post-op patient weight bear as tolerated and range of motion as tolerated to the left lower extremity as per Orthopedics recommendation. Patient placed on Apixiban 2.5 mg po BID and ANNMARIE/SCD's for DVT prophylaxis post-op and will need for total of 30 days.    Pt is found in his home today. He reports he is improving. Participating in PT thru . He reports pain is much improved. Using OTC meds for pain. Walker in use      DECISION MAKING TODAY       Assessment & Plan:  1. Essential hypertension  Overview:  Amlodipine 5 and losartan 50  Notes + arthralgia, [place]; LE edema with amlodipine, previously on 10 but improved LE edema with 5mg and adding losartan  Pt would like to try to  get off this and try alternative instead   + fatigue and arthralgia still feels since starting amlodipine   BP is well controlled but wants alternative   Will try Benicar 20; monitor BP at home and f/u in 2 weeks     Assessment & Plan:  Chronic and controlled   At goal today  Continue Chlorthalidone daily  Monitor      2. Closed displaced intertrochanteric fracture of left femur with routine healing, subsequent encounter  Assessment & Plan:  S/p IM nail 2/7/25  Normal post op course  Returning to baseline  Walker in use-Therapy in place  Monitor      - Continue all medications, treatments and therapies as ordered.   - Follow all instructions, recommendations as discussed.  - Maintain Safety Precautions at all times.  - Attend all medical appointments as scheduled.  - For worsening symptoms: call Primary Care Physician or Nurse Practitioner.  - For emergencies, call 911 or immediately report to the nearest emergency room.    Questions elicited and answered.  Contact information provided for any changes in condition or concerns       Medication List on Discharge:     Medication List            Accurate as of February 20, 2025  3:09 PM. If you have any questions, ask your nurse or doctor.                CONTINUE taking these medications      acetaminophen 500 MG tablet  Commonly known as: TYLENOL  Take 2 tablets (1,000 mg total) by mouth every 8 (eight) hours as needed for Pain.     celecoxib 200 MG capsule  Commonly known as: CeleBREX  Take 1 capsule (200 mg total) by mouth once daily. for 14 days     chlorthalidone 25 MG Tab  Commonly known as: HYGROTEN  Take 25 mg by mouth once daily.     ELIQUIS 2.5 mg Tab  Generic drug: apixaban  Take 1 tablet (2.5 mg total) by mouth 2 (two) times daily.     methocarbamoL 500 MG Tab  Commonly known as: Robaxin  Take 1 tablet (500 mg total) by mouth every 6 (six) hours. for 14 days     oxyCODONE 5 MG immediate release tablet  Commonly known as: ROXICODONE  Take 1 tablet (5 mg  total) by mouth every 4 (four) hours as needed for Pain.              Medication Reconciliation:  Were medications changed on discharge? Yes  Were medications in the home? Yes  Is the patient taking the medications as directed? Yes  Does the patient understand the medications and changes? Yes  Does updated med list accurately reflects meds patient is currently taking? Yes    ENVIRONMENT OF CARE      Family and/or Caregiver present at visit?  No  Name of Caregiver:   History provided by: patient    Advance Care Planning   Advanced Care Planning Status:  Patient has had an ACP conversation  Living Will: No  Power of : No  [place]: No    Does Caregiver have HCPoA: No  Changes today:   Is patient hospice appropriate: No  (If needed, use PPS <30 or FAST score >7)  Was referral to hospice placed: No       Impression upon entering the home:  Physical Dwelling: single family home   Appearance of home environment: cleaniness: clean  Functional Status: independent  Mobility: ambulatory with device  Nutritional access: adequate intake and access  Home Health: Yes,  Agency [facility]    DME/Supplies: rolling walker     Diagnostic tests reviewed/disposition: No diagnosic tests pending after this hospitalization.  Disease/illness education:   Establishment or re-establishment of referral orders for community resources: No other necessary community resources.   Discussion with other health care providers: No discussion with other health care providers necessary.   Does patient have a PCP at [facility]? Yes   Repatriation plan with PCP? follow-up with PCP within 90d   Does patient have an ostomy (ileostomy, colostomy, suprapubic catheter, nephrostomy tube, tracheostomy, PEG tube, pleurex catheter, cholecystostomy, etc)? No  Were BPAs reviewed? Yes    Social History     Socioeconomic History    Marital status:     Number of children: 3   Occupational History     Employer: [employer]   Tobacco Use    Smoking status: Never     Smokeless tobacco: Never   Substance and Sexual Activity    Alcohol use: Yes     Alcohol/week: 3.0 standard drinks of alcohol     Types: 3 Cans of beer per week     Comment: occasionally    Drug use: No    Sexual activity: Yes     Partners: Female     Comment: vasectomy   Social History Narrative    He lives with his wife in their own home in [place]. He has 2 adult children in college.  He is a [employer] m[employer] at [employer]. Non-smoker. No alcohol or substance abuse history                  Social Drivers of Health     Financial Resource Strain: Low Risk  (2/8/2025)    Overall Financial Resource Strain (CARDIA)     Difficulty of Paying Living Expenses: Not very hard   Food Insecurity: No Food Insecurity (2/8/2025)    Hunger Vital Sign     Worried About Running Out of Food in the Last Year: Never true     Ran Out of Food in the Last Year: Never true   Transportation Needs: No Transportation Needs (2/8/2025)    TRANSPORTATION NEEDS     Transportation : No   Physical Activity: Insufficiently Active (2/8/2025)    Exercise Vital Sign     Days of Exercise per Week: 3 days     Minutes of Exercise per Session: 10 min   Stress: Stress Concern Present (2/7/2025)    [employer] [place] [place] [employer] - Occupational Stress Questionnaire     Feeling of Stress : To some extent   Housing Stability: Unknown (2/8/2025)    Housing Stability Vital Sign     Unable to Pay for Housing in the Last Year: No     Homeless in the Last Year: No       OBJECTIVE:     Vital Signs:  Vitals:    02/20/25 1016   BP: 124/68   Pulse: 70   Resp: 16   Temp: 97.2 °F (36.2 °C)       Review of Systems   Constitutional:  Negative for activity change, fatigue and fever.   HENT: Negative.     Eyes: Negative.    Respiratory:  Negative for chest tightness.    Cardiovascular: Negative.  Negative for leg swelling.   Gastrointestinal: Negative.    Endocrine: Negative.    Genitourinary: Negative.    Musculoskeletal:  Positive for arthralgias (hip pain) and gait  problem.   Skin: Negative.    Allergic/Immunologic: Negative.    Hematological: Negative.    Psychiatric/Behavioral: Negative.  Negative for agitation.    All other systems reviewed and are negative.      Physical Exam:  Physical Exam  Vitals reviewed.   Constitutional:       General: He is not in acute distress.     Appearance: He is well-developed.   HENT:      Head: Normocephalic and atraumatic.      Nose: Nose normal.      Mouth/Throat:      Mouth: Mucous membranes are dry.   Eyes:      Pupils: Pupils are equal, round, and reactive to light.   Cardiovascular:      Rate and Rhythm: Normal rate and regular rhythm.      Heart sounds: Normal heart sounds.   Pulmonary:      Effort: Pulmonary effort is normal.      Breath sounds: Normal breath sounds.   Abdominal:      General: Bowel sounds are normal.      Palpations: Abdomen is soft.   Musculoskeletal:         General: Normal range of motion.      Cervical back: Normal range of motion and neck supple.   Skin:     General: Skin is warm and dry.      Comments: Incision to left hip   Neurological:      Mental Status: He is alert and oriented to person, place, and time.      Cranial Nerves: No cranial nerve deficit.   Psychiatric:         Behavior: Behavior normal.         Thought Content: Thought content normal.         Judgment: Judgment normal.         INSTRUCTIONS FOR PATIENT:     Scheduled Follow-up, Appts Reviewed with Modifications if Needed: Yes  Future Appointments   Date Time Provider Department Center   2/21/2025 12:45 PM Ric, Romario K, NP [facility] [address]   3/21/2025  1:30 PM Ric, Romario K, NP [facility] [address]         Signature: Sandi Art NP    Transition of Care Visit:  I have reviewed and updated the history and problem list.  I have reconciled the medication list.  I have discussed the hospitalization and current medical issues, prognosis and plans with the patient/family.

## 2025-02-20 NOTE — ASSESSMENT & PLAN NOTE
S/p IM nail 2/7/25  Normal post op course  Returning to baseline  Walker in use-Therapy in place  Monitor

## 2025-02-21 ENCOUNTER — OFFICE VISIT (OUTPATIENT)
Dept: ORTHOPEDICS | Facility: CLINIC | Age: 60
End: 2025-02-21
Payer: OTHER MISCELLANEOUS

## 2025-02-21 ENCOUNTER — HOSPITAL ENCOUNTER (OUTPATIENT)
Dept: RADIOLOGY | Facility: HOSPITAL | Age: 60
Discharge: HOME | End: 2025-02-21
Attending: NURSE PRACTITIONER
Payer: COMMERCIAL

## 2025-02-21 VITALS — HEART RATE: 98 BPM | DIASTOLIC BLOOD PRESSURE: 91 MMHG | TEMPERATURE: 98 F | SYSTOLIC BLOOD PRESSURE: 162 MMHG

## 2025-02-21 DIAGNOSIS — Z98.890 POST-OPERATIVE STATE: ICD-10-CM

## 2025-02-21 DIAGNOSIS — R52 PAIN: ICD-10-CM

## 2025-02-21 DIAGNOSIS — S72.002D CLOSED FRACTURE OF LEFT HIP WITH ROUTINE HEALING, SUBSEQUENT ENCOUNTER: Primary | ICD-10-CM

## 2025-02-21 DIAGNOSIS — R52 PAIN: Primary | ICD-10-CM

## 2025-02-21 PROCEDURE — 72170 X-RAY EXAM OF PELVIS: CPT | Mod: 26,,, | Performed by: INTERNAL MEDICINE

## 2025-02-21 PROCEDURE — 72170 X-RAY EXAM OF PELVIS: CPT | Mod: TC

## 2025-02-21 PROCEDURE — 73552 X-RAY EXAM OF FEMUR 2/>: CPT | Mod: 26,LT,, | Performed by: RADIOLOGY

## 2025-02-21 PROCEDURE — 73552 X-RAY EXAM OF FEMUR 2/>: CPT | Mod: TC,LT

## 2025-02-21 NOTE — PROGRESS NOTES
Mr. Keane is here today for a post-operative visit after undergoing the following:      ICD-10-CM ICD-9-CM   1. Closed fracture of left hip with routine healing, subsequent encounter s/p IM nail 2/7/25  S72.002D V54.13   2. Post-operative state  Z98.890 V45.89       by Dr. Taylor on 2/7/2025.    Interval History:  He reports that he is doing excellent.  He states their pain is excellent.  He is not taking pain medication.  He is currently  home getting home health but will be discharged soon, wants to go to outpatient PT .  He is currently walking with a walker.  He reports this injury is a worker's comp case. He denies any falls or injuries since surgery/last seen in the clinic.  He denies fever, chills, and sweats since the time of the surgery.     Physical exam:  The patient's dressing was taken down.  Incision is clean, dry and intact.  No signs of infection noted.  Skin was closed with Dermabond and Stratifix ends were clipped.  He has tactile stimulation to their lower leg, he denies calf pain, there is no leg edema and their pedal pulse is palpable x 2.  Hip ROM is 0-90 degrees.  Knee ROM is 0-100 degrees.  There is healing bruising to his left thigh.    RADS: AP pelvis and left femur xray was obtained, findings show an IM nailing with gamma tony appears to be in good position.  His fracture remains stable.  No evidence of hardware failure or new fracture seen.    Assessment:  Post-op visit (2 weeks)    Plan:    ICD-10-CM ICD-9-CM   1. Closed fracture of left hip with routine healing, subsequent encounter s/p IM nail 2/7/25  S72.002D V54.13   2. Post-operative state  Z98.890 V45.89     Current care, treatment plan, precautions, activity level/ modifications, limitations, rehabilitation exercises and proposed future treatment were discussed with the patient. We discussed the need to monitor for changes in symptoms and condition and report them to the physician.  Discussed importance of compliance with all  appointments and follow up examinations.     WOUND CARE ORDERS  -Juarez Lo was advised to keep the incision clean and dry for the next 24 hours after which he may wash the area with antibacterial soap in the shower.   -He not submerge until the incision is completely healed  -Patient was advised to monitor wound closely and multiple times daily for any problems. Call clinic immediately or report to ED for immediate medical attention for any complications including reopening of wound, drainage, purulence, redness, streaking, odor, pain out of proportion, fever, chills, etc.   - If there are signs of infection, please call [facility] [phone] for further instructions and to make appt to be seen.       PHYSICAL THERAPY:   - Physical therapy will refer to [facility] in [place].  - Weight bearing as tolerated   - Range of motion as tolerated.   - No driving minimal 6 weeks.     PAIN MEDICATION:   - Pain medication: refill was not needed.  - Pain medication refill policy provided to patient for review, yes.    - Patient was informed a multi-modal approach is used to treat their pain.     DVT PROPHYLAXIS:   - Eliquis 2.5 mg bid    FRAGILITY:  - Patient has been referred to the fracture clinic.     FOLLOW UP:   - Patient will follow up in the clinic in 4 weeks.  - X-ray of his AP pelvis and left femur is needed.    - Future Appointments:   Future Appointments   Date Time Provider Department Center   3/21/2025  1:30 PM Romario Larios NP [facility] [address]         If there are any questions prior to scheduled follow up, the patient was instructed to contact the office

## 2025-03-21 ENCOUNTER — PATIENT MESSAGE (OUTPATIENT)
Dept: ORTHOPEDICS | Facility: CLINIC | Age: 60
End: 2025-03-21

## 2025-03-21 ENCOUNTER — OFFICE VISIT (OUTPATIENT)
Dept: ORTHOPEDICS | Facility: CLINIC | Age: 60
End: 2025-03-21
Payer: COMMERCIAL

## 2025-03-21 ENCOUNTER — HOSPITAL ENCOUNTER (OUTPATIENT)
Dept: RADIOLOGY | Facility: HOSPITAL | Age: 60
Discharge: HOME | End: 2025-03-21
Attending: NURSE PRACTITIONER
Payer: COMMERCIAL

## 2025-03-21 DIAGNOSIS — R52 PAIN: ICD-10-CM

## 2025-03-21 DIAGNOSIS — R52 PAIN: Primary | ICD-10-CM

## 2025-03-21 DIAGNOSIS — S72.002D CLOSED FRACTURE OF LEFT HIP WITH ROUTINE HEALING, SUBSEQUENT ENCOUNTER: Primary | ICD-10-CM

## 2025-03-21 DIAGNOSIS — Z98.890 POST-OPERATIVE STATE: ICD-10-CM

## 2025-03-21 PROCEDURE — 72170 X-RAY EXAM OF PELVIS: CPT | Mod: 26,,, | Performed by: RADIOLOGY

## 2025-03-21 PROCEDURE — 99999 PR PBB SHADOW E&M-EST. PATIENT-LVL II: CPT | Mod: PBBFAC,,, | Performed by: NURSE PRACTITIONER

## 2025-03-21 PROCEDURE — 72170 X-RAY EXAM OF PELVIS: CPT | Mod: TC

## 2025-03-21 PROCEDURE — 73552 X-RAY EXAM OF FEMUR 2/>: CPT | Mod: 26,LT,, | Performed by: RADIOLOGY

## 2025-03-21 PROCEDURE — 73552 X-RAY EXAM OF FEMUR 2/>: CPT | Mod: TC,LT

## 2025-03-21 NOTE — PROGRESS NOTES
Mr. Keane is here today for a post-operative visit after undergoing the following:      ICD-10-CM ICD-9-CM   1. Closed fracture of left hip with routine healing, subsequent encounter s/p IM nail 2/7/25  S72.002D V54.13   2. Post-operative state  Z98.890 V45.89       by Dr. Taylor on 2/7/2025.    Interval History:  He reports that he is doing good.  He states their pain is good, he has intermittent pain in the lateral leg and lower leg when going from sit to stand.  He is not taking pain medication.  He is currently  home going to outpatient PT.  They have gotten him off of the cane .  He reports this injury is a worker's comp case. He denies any falls or injuries since surgery/last seen in the clinic.  He denies fever, chills, and sweats since the time of the surgery.     Physical exam:  The patient's incision is open to air and healed.  He has tactile stimulation to their lower leg, he denies calf pain, there is no leg edema and their pedal pulse is palpable x 2.  Hip ROM is 0-90 degrees.  Knee ROM is 0-100 degrees.  There is no pain with axial loading or log roll.    RADS: AP pelvis and left femur xray was obtained, findings show an IM nailing with gamma tony appears to be in good position.  His fracture remains stable.  Fracture site of appears a little more prominent when compared to last x-ray.  No evidence of hardware failure or new fracture seen.    Assessment:  Post-op visit (6 weeks)    Plan:    ICD-10-CM ICD-9-CM   1. Closed fracture of left hip with routine healing, subsequent encounter s/p IM nail 2/7/25  S72.002D V54.13   2. Post-operative state  Z98.890 V45.89     Current care, treatment plan, precautions, activity level/ modifications, limitations, rehabilitation exercises and proposed future treatment were discussed with the patient. We discussed the need to monitor for changes in symptoms and condition and report them to the physician.  Discussed importance of compliance with all appointments  and follow up examinations.       I sent a message to the trauma surgeon for him to review patient's x-ray and we will update the patient with additional recommendations.    PHYSICAL THERAPY:   - Physical therapy [facility] in [place].  - Weight bearing as tolerated   - Range of motion as tolerated.   - Recommend to hold off driving for another 4 weeks.     PAIN MEDICATION:   - Pain medication: refill was not needed.  Suggest Tylenol and/or NSAIDS PRN.  - Pain medication refill policy provided to patient for review, yes.    - Patient was informed a multi-modal approach is used to treat their pain.     FRAGILITY:  - Patient has been referred to the fracture clinic.     FOLLOW UP:   - Patient will follow up in the clinic in 4 weeks.  - X-ray of his AP pelvis and left femur is needed.    - Future Appointments:   No future appointments.        If there are any questions prior to scheduled follow up, the patient was instructed to contact the office

## 2025-03-24 DIAGNOSIS — S72.002D CLOSED FRACTURE OF LEFT HIP WITH ROUTINE HEALING, SUBSEQUENT ENCOUNTER: Primary | ICD-10-CM

## 2025-03-24 RX ORDER — TRAMADOL HYDROCHLORIDE 50 MG/1
50 TABLET ORAL EVERY 12 HOURS PRN
Qty: 14 EACH | Refills: 0 | Status: SHIPPED | OUTPATIENT
Start: 2025-03-24 | End: 2025-03-31

## 2025-04-07 ENCOUNTER — EXTERNAL HOME HEALTH (OUTPATIENT)
Dept: HOME HEALTH SERVICES | Facility: HOSPITAL | Age: 60
End: 2025-04-07
Payer: COMMERCIAL

## 2025-04-14 ENCOUNTER — OFFICE VISIT (OUTPATIENT)
Dept: ORTHOPEDICS | Facility: CLINIC | Age: 60
End: 2025-04-14
Payer: COMMERCIAL

## 2025-04-14 ENCOUNTER — HOSPITAL ENCOUNTER (OUTPATIENT)
Dept: RADIOLOGY | Facility: HOSPITAL | Age: 60
Discharge: HOME | End: 2025-04-14
Attending: NURSE PRACTITIONER
Payer: COMMERCIAL

## 2025-04-14 DIAGNOSIS — R52 PAIN: ICD-10-CM

## 2025-04-14 DIAGNOSIS — R52 PAIN: Primary | ICD-10-CM

## 2025-04-14 DIAGNOSIS — Z98.890 POST-OPERATIVE STATE: ICD-10-CM

## 2025-04-14 DIAGNOSIS — S72.002D CLOSED FRACTURE OF LEFT HIP WITH ROUTINE HEALING, SUBSEQUENT ENCOUNTER: Primary | ICD-10-CM

## 2025-04-14 PROCEDURE — 99024 POSTOP FOLLOW-UP VISIT: CPT | Mod: S$GLB,,, | Performed by: NURSE PRACTITIONER

## 2025-04-14 PROCEDURE — 1159F MED LIST DOCD IN RCRD: CPT | Mod: CPTII,S$GLB,, | Performed by: NURSE PRACTITIONER

## 2025-04-14 PROCEDURE — 73552 X-RAY EXAM OF FEMUR 2/>: CPT | Mod: 26,LT,, | Performed by: RADIOLOGY

## 2025-04-14 PROCEDURE — 3044F HG A1C LEVEL LT 7.0%: CPT | Mod: CPTII,S$GLB,, | Performed by: NURSE PRACTITIONER

## 2025-04-14 PROCEDURE — 72170 X-RAY EXAM OF PELVIS: CPT | Mod: TC

## 2025-04-14 PROCEDURE — 72170 X-RAY EXAM OF PELVIS: CPT | Mod: 26,,, | Performed by: RADIOLOGY

## 2025-04-14 PROCEDURE — 99999 PR PBB SHADOW E&M-EST. PATIENT-LVL II: CPT | Mod: PBBFAC,,, | Performed by: NURSE PRACTITIONER

## 2025-04-14 PROCEDURE — 1160F RVW MEDS BY RX/DR IN RCRD: CPT | Mod: CPTII,S$GLB,, | Performed by: NURSE PRACTITIONER

## 2025-04-14 PROCEDURE — 73552 X-RAY EXAM OF FEMUR 2/>: CPT | Mod: TC,LT

## 2025-04-14 NOTE — PROGRESS NOTES
Mr. Keane is here today for a post-operative visit after undergoing the following:      ICD-10-CM ICD-9-CM   1. Closed fracture of left hip with routine healing, subsequent encounter s/p ORIF 2/7/25  S72.002D V54.13   2. Post-operative state  Z98.890 V45.89       by Dr. Taylor on 2/7/2025.    Interval History:  He reports that he is doing good.  He states their pain is good, he is working with therapy and finds the tramadol has helped with his pain.  He takes Tylenol mostly.  He does endorse intermittent buttock and lateral hip pain when going from sit to stand.  He feels he is ready to return to work in the next 2 weeks on light duty.  He reports this injury is a worker's comp case. He denies any falls or injuries since surgery/last seen in the clinic.  He denies fever, chills, and sweats since the time of the surgery.     Physical exam:  The patient's incision is open to air and healed.  He has tactile stimulation to their lower leg, he denies calf pain, there is no leg edema and their pedal pulse is palpable x 2.  Hip ROM is 0-90 degrees.  Knee ROM is 0-120 degrees.  There is no pain with axial loading or log roll.    RADS: AP pelvis and left femur xray was obtained, findings show Osteopenia. Reconfirmed findings open reduction internal fixation of now subacute proximal left femoral fracture with gamma nail and long stem intramedullary tony, no detrimental changes in the appearance of fracture site, position alignment of fracture fragments, or fixating hardware. Some stable mild narrowing of the left superolateral hip joint space and left acetabular roof spurring. No definite narrowing of tibiofemoral or patellofemoral articulations and minimal spurring posterior patella. Above described bony findings unchanged to earlier exam.     Assessment:  Post-op visit (10 weeks)    Plan:    ICD-10-CM ICD-9-CM   1. Closed fracture of left hip with routine healing, subsequent encounter s/p ORIF 2/7/25  S72.002D V54.13    2. Post-operative state  Z98.890 V45.89     Current care, treatment plan, precautions, activity level/ modifications, limitations, rehabilitation exercises and proposed future treatment were discussed with the patient. We discussed the need to monitor for changes in symptoms and condition and report them to the physician.  Discussed importance of compliance with all appointments and follow up examinations.       60-year-old male who is 10 weeks status post IM nailing for a left intertrochanteric femur fracture.  He is currently working with outpatient therapy.  He reports he gets soreness to his piriformis and lateral hip the day following therapy.  He uses Tylenol on most days and tramadol when the pain is severe.  Denies any falls or injuries since last seen in clinic.  He is walking independently without an assistive device.    PHYSICAL THERAPY:   - Physical therapy [facility] in [place].  - Weight bearing as tolerated   - Range of motion as tolerated.      PAIN MEDICATION:   - Pain medication: refill was not needed.  Suggest Tylenol and/or NSAIDS PRN.  - Pain medication refill policy provided to patient for review, yes.    - Patient was informed a multi-modal approach is used to treat their pain.     FRAGILITY:  - Patient has been referred to the fracture clinic.  He will be seeing Kenji on Thursday.     FOLLOW UP:   - Patient will follow up in the clinic in 12 weeks.  - X-ray of his AP pelvis and left femur is needed.  - I will give him a letter allowing him to return to work on May 5, 2025 with restrictions.    - Future Appointments:   Future Appointments   Date Time Provider Department Center   4/17/2025  1:00 PM [facility] [address]   7/14/2025 11:30 AM Romario Larios NP [facility] [address] [place]   12/5/2025 10:20 AM Sandrine Olivo DO [facility] [place]           If there are any questions prior to scheduled follow up, the patient was instructed to contact the  office

## 2025-04-16 ENCOUNTER — PATIENT MESSAGE (OUTPATIENT)
Dept: ORTHOPEDICS | Facility: CLINIC | Age: 60
End: 2025-04-16
Payer: COMMERCIAL

## 2025-04-16 DIAGNOSIS — S72.002D CLOSED FRACTURE OF LEFT HIP WITH ROUTINE HEALING, SUBSEQUENT ENCOUNTER: Primary | ICD-10-CM

## 2025-04-16 RX ORDER — AMOXICILLIN 500 MG/1
500 TABLET, FILM COATED ORAL ONCE
Qty: 4 TABLET | Refills: 0 | Status: SHIPPED | OUTPATIENT
Start: 2025-04-16 | End: 2025-04-16

## 2025-04-17 ENCOUNTER — LAB VISIT (OUTPATIENT)
Dept: LAB | Facility: HOSPITAL | Age: 60
End: 2025-04-17
Payer: COMMERCIAL

## 2025-04-17 ENCOUNTER — OFFICE VISIT (OUTPATIENT)
Dept: ORTHOPEDICS | Facility: CLINIC | Age: 60
End: 2025-04-17
Payer: OTHER MISCELLANEOUS

## 2025-04-17 DIAGNOSIS — M80.80XA PATHOLOGICAL FRACTURE DUE TO OSTEOPOROSIS, UNSPECIFIED FRACTURE SITE, UNSPECIFIED OSTEOPOROSIS TYPE, INITIAL ENCOUNTER: Primary | ICD-10-CM

## 2025-04-17 DIAGNOSIS — M80.80XA PATHOLOGICAL FRACTURE DUE TO OSTEOPOROSIS, UNSPECIFIED FRACTURE SITE, UNSPECIFIED OSTEOPOROSIS TYPE, INITIAL ENCOUNTER: ICD-10-CM

## 2025-04-17 DIAGNOSIS — M81.6 LOCALIZED OSTEOPOROSIS OF LEQUESNE: ICD-10-CM

## 2025-04-17 DIAGNOSIS — M81.0 OSTEOPOROSIS, UNSPECIFIED OSTEOPOROSIS TYPE, UNSPECIFIED PATHOLOGICAL FRACTURE PRESENCE: ICD-10-CM

## 2025-04-17 LAB
25(OH)D3+25(OH)D2 SERPL-MCNC: 49 NG/ML (ref 30–96)
ALBUMIN SERPL BCP-MCNC: 3.9 G/DL (ref 3.5–5.2)
ALP SERPL-CCNC: 106 UNIT/L (ref 40–150)
ALT SERPL W/O P-5'-P-CCNC: 19 UNIT/L (ref 10–44)
ANION GAP (OHS): 9 MMOL/L (ref 8–16)
AST SERPL-CCNC: 20 UNIT/L (ref 11–45)
BILIRUB SERPL-MCNC: 0.3 MG/DL (ref 0.1–1)
BUN SERPL-MCNC: 16 MG/DL (ref 6–20)
CALCIUM SERPL-MCNC: 9.2 MG/DL (ref 8.7–10.5)
CHLORIDE SERPL-SCNC: 97 MMOL/L (ref 95–110)
CO2 SERPL-SCNC: 32 MMOL/L (ref 23–29)
CREAT SERPL-MCNC: 0.9 MG/DL (ref 0.5–1.4)
GFR SERPLBLD CREATININE-BSD FMLA CKD-EPI: >60 ML/MIN/1.73/M2
GLUCOSE SERPL-MCNC: 112 MG/DL (ref 70–110)
POTASSIUM SERPL-SCNC: 3.3 MMOL/L (ref 3.5–5.1)
PROT SERPL-MCNC: 7.1 GM/DL (ref 6–8.4)
PTH-INTACT SERPL-MCNC: 34.1 PG/ML (ref 9–77)
SODIUM SERPL-SCNC: 138 MMOL/L (ref 136–145)
T4 FREE SERPL-MCNC: 0.91 NG/DL (ref 0.71–1.51)
TSH SERPL-ACNC: 1.66 UIU/ML (ref 0.4–4)

## 2025-04-17 PROCEDURE — 84439 ASSAY OF FREE THYROXINE: CPT

## 2025-04-17 PROCEDURE — 84075 ASSAY ALKALINE PHOSPHATASE: CPT

## 2025-04-17 PROCEDURE — 36415 COLL VENOUS BLD VENIPUNCTURE: CPT

## 2025-04-17 PROCEDURE — 80053 COMPREHEN METABOLIC PANEL: CPT

## 2025-04-17 PROCEDURE — 83970 ASSAY OF PARATHORMONE: CPT

## 2025-04-17 PROCEDURE — 84443 ASSAY THYROID STIM HORMONE: CPT

## 2025-04-17 PROCEDURE — 82306 VITAMIN D 25 HYDROXY: CPT

## 2025-04-17 NOTE — PROGRESS NOTES
SUBJECTIVE:     Chief Complaint & History of Present Illness:  Juarez Keane is a new patient 60 y.o. male who is seen here today for a bone health evaluation for osteoporosis, fracture prevention, and risk evaluation for future fractures.   he is accompanied by wife  today who is helpful with his history.  History of Present Illness    - Mr. Keane presents for initial evaluation of osteoporosis risk following a pelvic fracture.    - Presents to orthopedic surgeon for evaluation following a pelvic fracture  - Injury sustained from a fall on a set of keys in his pocket while walking on rocks  - Type of fracture from standing height fall raises concern for potential osteoporosis  - Reports ongoing discomfort, mentioning a limp and persistent pain in right foot upon initial steps  - Fall was accidental  - Scheduled to return to work on the 5th, suggesting a relatively recent injury  - Calcium levels consistently low in recent lab work, though not critically so  - History of previous fractures: wrist fracture in childhood and broken fingers  - Emphasizes previous injuries had more significant causes, unlike recent fall  - Being seen under worker's compensation  - Denies any history of osteoporosis prior to this injury    - Mr. Kenae is returning to work on the 5th  - This is a workman's compensation case  - Mr. Keane expresses concern about taking time off work for appointments        he was appropriately identified and referred by Romario Larios NP due to concerns for compromised bone quality, and risk of future fractures.  This visit is medically necessary to identify risk, investigate and initiative treatment as appropriate to improve bone quality and strength for the reduction of secondary fractures.     his medical history, medications and fracture history will be reviewed and summarized      Review of patient's allergies indicates:   Allergen Reactions    Sulfa (sulfonamide antibiotics)      "     Current Medications[1]    Past Medical History:   Diagnosis Date    Degenerative disc disease, cervical     Essential hypertension 07/21/2022    GERD (gastroesophageal reflux disease)     Optic neuritis, right     S/P cervical spinal fusion 11/02/2017       Past Surgical History:   Procedure Laterality Date    Bilateral knee arthroscopic      HERNIA REPAIR      Left     INTRAMEDULLARY RODDING OF FEMUR Left 2/7/2025    Procedure: INSERTION, INTRAMEDULLARY ASHLEY, FEMUR;  Surgeon: Willie Taylor MD;  Location: [facility] OR [address] [place] [address];  Service: Orthopedics;  Laterality: Left;    SPINE SURGERY      Cervical Fusion C5,C6    VASECTOMY  07/1990       Lab Results   Component Value Date     02/09/2025    K 3.5 02/09/2025     02/09/2025    CO2 30 (H) 02/09/2025     02/09/2025    BUN 13 02/09/2025    CREATININE 0.8 02/09/2025    CALCIUM 8.3 (L) 02/09/2025    PROT 6.3 02/06/2025    ALBUMIN 3.8 02/06/2025    BILITOT 0.4 02/06/2025    ALKPHOS 81 02/06/2025    AST 19 02/06/2025    ALT 18 02/06/2025    ANIONGAP 10 02/09/2025    ESTGFRAFRICA >60.0 11/04/2021    EGFRNONAA >60.0 11/04/2021      Lab Results   Component Value Date    WBC 7.64 02/09/2025    RBC 3.73 (L) 02/09/2025    HGB 11.4 (L) 02/09/2025    HCT 32.9 (L) 02/09/2025    MCV 88 02/09/2025    MCH 30.6 02/09/2025    MCHC 34.7 02/09/2025    RDW 12.8 02/09/2025     02/09/2025    MPV 10.2 02/09/2025    GRAN 5.1 02/09/2025    GRAN 66.2 02/09/2025    LYMPH 1.6 02/09/2025    LYMPH 21.1 02/09/2025    MONO 0.8 02/09/2025    MONO 9.9 02/09/2025    EOS 0.2 02/09/2025    BASO 0.04 02/09/2025    EOSINOPHIL 2.0 02/09/2025    BASOPHIL 0.5 02/09/2025    DIFFMETHOD Automated 02/09/2025      Lab Results   Component Value Date    MG 1.9 02/06/2025      Lab Results   Component Value Date    PHOS 3.4 02/06/2025      Lab Results   Component Value Date    [id number] 30 02/06/2025      No results found for: "PTH"   Lab Results   Component Value Date    TSH 1.880 " "11/15/2024      Lab Results   Component Value Date    FREET4 0.84 11/10/2022      Lab Results   Component Value Date    HGBA1C 5.8 (H) 02/06/2025    ESTIMATEDAVG 120 02/06/2025      No results found for: "FREETESTOSTE"         Vital Signs (Most Recent)  There were no vitals filed for this visit.      Today's Visit and Bone Health History  Question 4/16/2025  3:00 PM CDT - Filed by Patient   Have you had any loss of height or gotten shorter since your 20's? 0   Are you postmenopausal? No   Have you ever been told you have low testosterone? No   Have you ever taken any type of hormone replacement therapy? No   Do you currently smoke? No   Have you ever smoked in the past? No   How many alcoholic beverages do you drink per day? 0   How many caffeinated beverages do you drink per day? 1 to 3   Have you had 2 or more falls in the past 12 months? No   How active have you been in the past 12 months? Somewhat active ( walk up to 1 mile per day )   Did either of your parents have a hip fracture after the age of 50? No   Have you ever been diagnosed with any of the following? Fracture   Do you currently have a fracture? Yes   If you currently have a fracture please indicate where and when the fracture occured. 2/6/25 femur/femoral neck   Have you broken any other bones since you turned 50 or older? No   Have you ever had a bone density test or DXA scan? No   Are you currently taking or have you ever taken any of the following medications? Opiods (Oxycodone, Hyrocodone)    Steroids (Prednisone, Cortisone)   Do you take Calcium? No   Do you take Vitamin D? Yes   If you take Vitamin D what dose? 2,000 IU daily   Have you ever been diagnosed with cancer? No   Have you ever been treated for cancer with high beam radiation or had radioactive implants? No     Previous Responses       he has medical history and medication use known to be associated with bone loss and osteoporosis to include pathological femur fracture opioid pain " medication steroids.       Review of Systems:  ROS:  Constitutional: no fever or chills  Eyes: no visual changes  ENT: no nasal congestion or sore throat  Respiratory: no respiratory symptoms  Cardiovascular: no chest pain or palpitations, positive for hypertension  Gastrointestinal: no nausea or vomiting, tolerating diet  Genitourinary: no hematuria or dysuria, positive hypokalemia  Integument/Breast: no rash or pruritis  Hematologic/Lymphatic: no easy bruising or lymphadenopathy  Musculoskeletal: no arthralgias or myalgias, pathological intertrochanteric femur fracture of the left  Neurological: no seizures or tremors, status post cervical spinal fusion  Behavioral/Psych: no auditory or visual hallucinations  Endocrine: no heat or cold intolerance      OBJECTIVE:     PHYSICAL EXAM:     ,                  General: no acute distress and well developed/well nourished  Behavioral/Psych: normal judgment and insight  Skin: no rash  Head/Neck: atraumatic, normocephalic, without obvious abnormality  Respiratory: normal respiratory effort  Cardiac: regular rate and rhythm  Vascular: pulses present  Abdomen: soft, non-tender  Musculoskeletal: no joint tenderness, deformity or swelling               ASSESSMENT/PLAN:     Assessment:    Osteoporosis, at high risk for future fractures, history of pathological femur fracture.    Plan:   30-45 minutes spent in face-to-face consultation with patient and his family members today, discussing the disease management of osteoporosis, for the reduction of future fractures.  I have explained that bone strength is equal to bone quality. A bone density / DEXA scan is important to complement his care since his fracture was by definition a fragility fracture/traumatic fracture.  Over half of the encounter was spent (50%) counseling the patient on the disease of osteoporosis evidence-based and best practice treatment options available as well as recommendations for improvement of bone  quality, the importance of nutritional supplements to include:  Calcium 6827-0579 mg daily in divided doses   Vitamin D3  8549-9495 units daily in divided doses.   Fall prevention and personal safety for the reduction of future fractures.    Clinicians Guidelines for the treatment of Osteoporosis 2023 as part of the National Osteoporosis Foundation were utilized as the evidence-based information provided.    Discussed pharmaceutical treatment options to include Biphosphatases, Denosumab, Abaloparatide and Teriparatide. Information to include indications for therapy, risks and benefits to treatment, and important safety information related to these treatments was provided and discussed.  Handouts were given to the patient for his review on osteoporosis and other pharmacological treatment information related to other available treatment options.    The importance of diet, impact exercise, and core strengthening with gait and balance exercise, through  both formal physical therapy programs and home exercise programs was discussed.     Bone density test recommended and ordered  Bone health labs recommended and ordered    We will see him back following testing discuss treatment options           [1]   Current Outpatient Medications   Medication Sig Dispense Refill    acetaminophen (TYLENOL) 500 MG tablet Take 2 tablets (1,000 mg total) by mouth every 8 (eight) hours as needed for Pain.      chlorthalidone (HYGROTEN) 25 MG Tab Take 25 mg by mouth once daily.       No current facility-administered medications for this visit.

## 2025-04-21 LAB — W ALKALINE PHOSPHATASE, BONE: 17.8 UG/L

## 2025-04-22 ENCOUNTER — HOSPITAL ENCOUNTER (OUTPATIENT)
Dept: RADIOLOGY | Facility: HOSPITAL | Age: 60
Discharge: HOME | End: 2025-04-22
Attending: PHYSICIAN ASSISTANT
Payer: OTHER MISCELLANEOUS

## 2025-04-22 DIAGNOSIS — M81.6 LOCALIZED OSTEOPOROSIS OF LEQUESNE: ICD-10-CM

## 2025-04-22 DIAGNOSIS — M81.0 OSTEOPOROSIS, UNSPECIFIED OSTEOPOROSIS TYPE, UNSPECIFIED PATHOLOGICAL FRACTURE PRESENCE: ICD-10-CM

## 2025-04-22 DIAGNOSIS — M80.80XA PATHOLOGICAL FRACTURE DUE TO OSTEOPOROSIS, UNSPECIFIED FRACTURE SITE, UNSPECIFIED OSTEOPOROSIS TYPE, INITIAL ENCOUNTER: ICD-10-CM

## 2025-04-22 PROCEDURE — 77080 DXA BONE DENSITY AXIAL: CPT | Mod: TC

## 2025-04-22 PROCEDURE — 77080 DXA BONE DENSITY AXIAL: CPT | Mod: 26,,, | Performed by: RADIOLOGY

## 2025-04-25 ENCOUNTER — OFFICE VISIT (OUTPATIENT)
Dept: ORTHOPEDICS | Facility: CLINIC | Age: 60
End: 2025-04-25
Payer: OTHER MISCELLANEOUS

## 2025-04-25 DIAGNOSIS — M80.80XA PATHOLOGICAL FRACTURE DUE TO OSTEOPOROSIS, UNSPECIFIED FRACTURE SITE, UNSPECIFIED OSTEOPOROSIS TYPE, INITIAL ENCOUNTER: Primary | ICD-10-CM

## 2025-04-25 DIAGNOSIS — M81.6 LOCALIZED OSTEOPOROSIS OF LEQUESNE: ICD-10-CM

## 2025-04-25 DIAGNOSIS — M81.0 OSTEOPOROSIS, UNSPECIFIED OSTEOPOROSIS TYPE, UNSPECIFIED PATHOLOGICAL FRACTURE PRESENCE: ICD-10-CM

## 2025-04-25 NOTE — PROGRESS NOTES
Chief Complaint & History of Present Illness:  Juarez Keane is a established patient 60 y.o. male who is seen here today for review of lab results, DEXA scan results, and determine a treatment plan for his osteoporosis.  he has reviewed the information provided at his initial visit.  After review of treatment options together we have elected to proceed with course of watchful waiting increase calcium and vitamin-D in diet a      Review of patient's allergies indicates:   Allergen Reactions    Sulfa (sulfonamide antibiotics)          Current Medications[1]    Past Medical History:   Diagnosis Date    Degenerative disc disease, cervical     Essential hypertension 07/21/2022    GERD (gastroesophageal reflux disease)     Optic neuritis, right     S/P cervical spinal fusion 11/02/2017       Past Surgical History:   Procedure Laterality Date    Bilateral knee arthroscopic      HERNIA REPAIR      Left     INTRAMEDULLARY RODDING OF FEMUR Left 2/7/2025    Procedure: INSERTION, INTRAMEDULLARY ASHLEY, FEMUR;  Surgeon: Willie Taylor MD;  Location: [facility] OR [address] [place] [address];  Service: Orthopedics;  Laterality: Left;    SPINE SURGERY      Cervical Fusion C5,C6    VASECTOMY  07/1990       Lab Results   Component Value Date     04/17/2025    K 3.3 (L) 04/17/2025    CL 97 04/17/2025    CO2 32 (H) 04/17/2025     02/09/2025    BUN 16 04/17/2025    CREATININE 0.9 04/17/2025    CALCIUM 9.2 04/17/2025    PROT 6.3 02/06/2025    ALBUMIN 3.9 04/17/2025    BILITOT 0.3 04/17/2025    ALKPHOS 106 04/17/2025    AST 20 04/17/2025    ALT 19 04/17/2025    ANIONGAP 9 04/17/2025    ESTGFRAFRICA >60.0 11/04/2021    EGFRNONAA >60.0 11/04/2021      Lab Results   Component Value Date    WBC 7.64 02/09/2025    RBC 3.73 (L) 02/09/2025    HGB 11.4 (L) 02/09/2025    HCT 32.9 (L) 02/09/2025    MCV 88 02/09/2025    MCH 30.6 02/09/2025    MCHC 34.7 02/09/2025    RDW 12.8 02/09/2025     02/09/2025    MPV 10.2 02/09/2025    GRAN 5.1  "02/09/2025    GRAN 66.2 02/09/2025    LYMPH 1.6 02/09/2025    LYMPH 21.1 02/09/2025    MONO 0.8 02/09/2025    MONO 9.9 02/09/2025    EOS 0.2 02/09/2025    BASO 0.04 02/09/2025    EOSINOPHIL 2.0 02/09/2025    BASOPHIL 0.5 02/09/2025    DIFFMETHOD Automated 02/09/2025      Lab Results   Component Value Date    MG 1.9 02/06/2025      Lab Results   Component Value Date    PHOS 3.4 02/06/2025      Lab Results   Component Value Date    [id number] 49 04/17/2025      Lab Results   Component Value Date    PTH 34.1 04/17/2025      Lab Results   Component Value Date    TSH 1.655 04/17/2025      Lab Results   Component Value Date    FREET4 0.91 04/17/2025      Lab Results   Component Value Date    HGBA1C 5.8 (H) 02/06/2025    ESTIMATEDAVG 120 02/06/2025      No results found for: "FREETESTOSTE"     DEXA Scan was reviewed and demonstrates :       T-Score Hip: -2.3     T-Score Spine: -1.2      FRAX:      Major Fx.: 15.6%         Hip Fx.: 4.1%                                    Vital Signs (Most Recent)  There were no vitals filed for this visit.      Review of Systems:  ROS:  Constitutional: no fever or chills  Eyes: no visual changes  ENT: no nasal congestion or sore throat  Respiratory: no respiratory symptoms  Cardiovascular: no chest pain or palpitations, positive for hypertension  Gastrointestinal: no nausea or vomiting, tolerating diet  Genitourinary: no hematuria or dysuria, positive hypokalemia  Integument/Breast: no rash or pruritis  Hematologic/Lymphatic: no easy bruising or lymphadenopathy  Musculoskeletal: no arthralgias or myalgias, pathological intertrochanteric femur fracture of the left  Neurological: no seizures or tremors, status post cervical spinal fusion  Behavioral/Psych: no auditory or visual hallucinations  Endocrine: no heat or cold intolerance    he will continue with her calcium and vitamin D.  Fall prevention and personal safety have been reviewed.    We have discussed the need for core strengthening " and balance exercises for fall prevention.      Assessment and plan:      ICD-10-CM ICD-9-CM   1. Pathological fracture due to osteoporosis, unspecified fracture site, unspecified osteoporosis type, initial encounter  M80.80XA 733.10     733.00   2. Osteoporosis, unspecified osteoporosis type, unspecified pathological fracture presence  M81.0 733.00   3. Localized osteoporosis of [place]  M81.6 733.09       Patient will continue exercise routine with weight-bearing exercise increase calcium vitamin-D follow-up in 1 year with PCP repeat DEXA scan in 2 years               [1]   Current Outpatient Medications   Medication Sig Dispense Refill    acetaminophen (TYLENOL) 500 MG tablet Take 2 tablets (1,000 mg total) by mouth every 8 (eight) hours as needed for Pain.      chlorthalidone (HYGROTEN) 25 MG Tab Take 25 mg by mouth once daily.       No current facility-administered medications for this visit.

## 2025-04-28 ENCOUNTER — PATIENT MESSAGE (OUTPATIENT)
Dept: FAMILY MEDICINE | Facility: CLINIC | Age: 60
End: 2025-04-28
Payer: COMMERCIAL

## 2025-04-29 ENCOUNTER — OFFICE VISIT (OUTPATIENT)
Dept: FAMILY MEDICINE | Facility: CLINIC | Age: 60
End: 2025-04-29
Payer: COMMERCIAL

## 2025-04-29 VITALS
DIASTOLIC BLOOD PRESSURE: 92 MMHG | HEIGHT: 69 IN | HEART RATE: 87 BPM | TEMPERATURE: 98 F | OXYGEN SATURATION: 98 % | WEIGHT: 184.19 LBS | SYSTOLIC BLOOD PRESSURE: 132 MMHG | BODY MASS INDEX: 27.28 KG/M2

## 2025-04-29 DIAGNOSIS — E87.6 HYPOKALEMIA: ICD-10-CM

## 2025-04-29 DIAGNOSIS — I10 ESSENTIAL HYPERTENSION: ICD-10-CM

## 2025-04-29 DIAGNOSIS — K64.9 HEMORRHOIDS, UNSPECIFIED HEMORRHOID TYPE: Primary | ICD-10-CM

## 2025-04-29 DIAGNOSIS — M85.80 OSTEOPENIA, UNSPECIFIED LOCATION: ICD-10-CM

## 2025-04-29 PROCEDURE — 3008F BODY MASS INDEX DOCD: CPT | Mod: CPTII,S$GLB,, | Performed by: NURSE PRACTITIONER

## 2025-04-29 PROCEDURE — 3080F DIAST BP >= 90 MM HG: CPT | Mod: CPTII,S$GLB,, | Performed by: NURSE PRACTITIONER

## 2025-04-29 PROCEDURE — 1159F MED LIST DOCD IN RCRD: CPT | Mod: CPTII,S$GLB,, | Performed by: NURSE PRACTITIONER

## 2025-04-29 PROCEDURE — 99999 PR PBB SHADOW E&M-EST. PATIENT-LVL IV: CPT | Mod: PBBFAC,,, | Performed by: NURSE PRACTITIONER

## 2025-04-29 PROCEDURE — 99214 OFFICE O/P EST MOD 30 MIN: CPT | Mod: S$GLB,,, | Performed by: NURSE PRACTITIONER

## 2025-04-29 PROCEDURE — 3044F HG A1C LEVEL LT 7.0%: CPT | Mod: CPTII,S$GLB,, | Performed by: NURSE PRACTITIONER

## 2025-04-29 PROCEDURE — 1160F RVW MEDS BY RX/DR IN RCRD: CPT | Mod: CPTII,S$GLB,, | Performed by: NURSE PRACTITIONER

## 2025-04-29 PROCEDURE — 3075F SYST BP GE 130 - 139MM HG: CPT | Mod: CPTII,S$GLB,, | Performed by: NURSE PRACTITIONER

## 2025-04-29 RX ORDER — POTASSIUM CHLORIDE 750 MG/1
10 CAPSULE, EXTENDED RELEASE ORAL ONCE
Qty: 1 CAPSULE | Refills: 0 | Status: SHIPPED | OUTPATIENT
Start: 2025-04-29 | End: 2025-04-30 | Stop reason: SDUPTHER

## 2025-04-29 RX ORDER — HYDROCORTISONE ACETATE PRAMOXINE HCL 2.5; 1 G/100G; G/100G
CREAM TOPICAL 3 TIMES DAILY
Qty: 30 G | Refills: 1 | Status: SHIPPED | OUTPATIENT
Start: 2025-04-29

## 2025-04-29 NOTE — PROGRESS NOTES
Patient ID: Juarez Keane is a 60 y.o. male.     Chief Complaint: Hemorrhoids      HPI:  History of Present Illness    CHIEF COMPLAINT:  Patient presents today for hemorrhoid concerns    HEMORRHOIDS:  Hemorrhoid symptoms that began last Wednesday evening, describing a raisin-sized hemorrhoid located on the edge of anus. He is currently managing symptoms with warm soaks, ice packs, and Preparation H. He has a history of hemorrhoids from approximately 10 years ago. A previous colonoscopy revealed hemorrhoids and rectal pockets. He denies pain, rectal bleeding. He has not had constipation or diabetes.     HYPERTENSION:  He has tried various blood pressure medications including Amlodipine which caused LE swelling, Losartan which caused joint pain, and Olmesartan which resulted in lightheadedness. He currently takes Chlorthalidone for blood pressure management. Home blood pressure readings are approximately 120-140/75-95. He denies CP, Sob, palpitations, LE swelling, headaches.     RECENT MEDICAL EVENTS:  He sustained a leg injury from a fall while ambulating on February 6th, resulting in hospitalization with an ORIF of the femur. He is currently in PT.       CURRENT MEDICATIONS:  He takes Tylenol 500mg, two tablets as needed for pain management.    LABS:  Potassium level is 3.3, consistently low since February. He denies muscle spasms or palpitations. He eats bananas and green leafy veggies daily.       ROS:  General: -fever, -chills, -fatigue, -weight gain, -weight loss  Eyes: -vision changes, -redness, -discharge  ENT: -ear pain, -nasal congestion, -sore throat  Cardiovascular: -chest pain, -palpitations, -lower extremity edema  Respiratory: -cough, -shortness of breath  Gastrointestinal: -abdominal pain, -nausea, -vomiting, -diarrhea, -constipation, -blood in stool, +hemorrhoids  Genitourinary: -dysuria, -hematuria, -frequency  Musculoskeletal: +joint pain, -muscle pain  Skin: -rash,  -lesion  Neurological: -headache, -dizziness, -numbness, -tingling  Psychiatric: -anxiety, -depression, -sleep difficulty            Active Problem List with Overview Notes    Diagnosis Date Noted    Osteopenia 04/29/2025    Hemorrhoids 04/29/2025    Closed displaced intertrochanteric fracture of left femur s/p IM nail on 2/7/2025 02/07/2025    Hypokalemia 02/07/2025    Prediabetes 02/07/2025    Essential hypertension 07/21/2022    S/P cervical spinal fusion 11/02/2017           Currently Medications  Medications Ordered Prior to Encounter[1]    Allergies  Review of patient's allergies indicates:   Allergen Reactions    Sulfa (sulfonamide antibiotics)         Health Maintenance  You are up to date for your primary preventive health care, and there are no reminders at this time.     PMH:  Past Medical History:   Diagnosis Date    Degenerative disc disease, cervical     Essential hypertension 07/21/2022    GERD (gastroesophageal reflux disease)     Optic neuritis, right     S/P cervical spinal fusion 11/02/2017      Past Surgical History:   Procedure Laterality Date    Bilateral knee arthroscopic      HERNIA REPAIR      Left     INTRAMEDULLARY RODDING OF FEMUR Left 2/7/2025    Procedure: INSERTION, INTRAMEDULLARY ASHLEY, FEMUR;  Surgeon: Willie Taylor MD;  Location: [facility] OR [address] [place] [address];  Service: Orthopedics;  Laterality: Left;    SPINE SURGERY      Cervical Fusion C5,C6    VASECTOMY  07/1990      Social History     Socioeconomic History    Marital status:     Number of children: 3   Occupational History     Employer: [employer]   Tobacco Use    Smoking status: Never    Smokeless tobacco: Never   Substance and Sexual Activity    Alcohol use: Yes     Alcohol/week: 3.0 standard drinks of alcohol     Types: 3 Cans of beer per week     Comment: occasionally    Drug use: No    Sexual activity: Yes     Partners: Female     Comment: vasectomy   Social History Narrative    He lives with his wife in their own home  "in [place]. He has 2 adult children in college.  He is a [employer] m[employer] at [employer]. Non-smoker. No alcohol or substance abuse history                  Social Drivers of Health     Financial Resource Strain: Low Risk  (2/8/2025)    Overall Financial Resource Strain (CARDIA)     Difficulty of Paying Living Expenses: Not very hard   Food Insecurity: No Food Insecurity (2/8/2025)    Hunger Vital Sign     Worried About Running Out of Food in the Last Year: Never true     Ran Out of Food in the Last Year: Never true   Transportation Needs: No Transportation Needs (2/8/2025)    TRANSPORTATION NEEDS     Transportation : No   Physical Activity: Insufficiently Active (2/8/2025)    Exercise Vital Sign     Days of Exercise per Week: 3 days     Minutes of Exercise per Session: 10 min   Stress: Stress Concern Present (2/7/2025)    [employer] [place] [employer] - Occupational Stress Questionnaire     Feeling of Stress : To some extent   Housing Stability: Unknown (2/8/2025)    Housing Stability Vital Sign     Unable to Pay for Housing in the Last Year: No     Homeless in the Last Year: No      Family History   Problem Relation Name Age of Onset    Cancer Father Jakob 68    Throat cancer Father Jakob     Hypertension Mother Cathy     Neuropathy Mother Cathy     Arthritis Mother Cathy     Heart disease Mother Cathy     Colon cancer Maternal Grandmother      Heart disease Neg Hx            Physical  Exam  Vitals:    04/29/25 1018   BP: (!) 132/92   BP Location: Left arm   Patient Position: Sitting   Pulse: 87   Temp: 97.9 °F (36.6 °C)   TempSrc: Temporal   SpO2: 98%   Weight: 83.6 kg (184 lb 3.1 oz)   Height: 5' 9" (1.753 m)      Body mass index is 27.2 kg/m².  Wt Readings from Last 3 Encounters:   04/29/25 83.6 kg (184 lb 3.1 oz)   02/07/25 79.4 kg (175 lb)   02/06/25 79.4 kg (175 lb)         Physical Exam    Vitals: Blood pressure: 132/90.  General: No acute distress. Well-developed. Well-nourished.  Eyes: " EOMI. Sclerae anicteric.  HENT: Normocephalic. Atraumatic. Nares patent. Moist oral mucosa.  Cardiovascular: Regular rate. Regular rhythm. No murmurs. No rubs. No gallops. Normal S1, S2.  Respiratory: Normal respiratory effort. Clear to auscultation bilaterally. No rales. No rhonchi. No wheezing.  Abdomen: Soft. Non-tender. Non-distended. Normoactive bowel sounds.  Musculoskeletal: No  obvious deformity.  Extremities: No lower extremity edema.  Neurological: Alert & oriented x3.   Psychiatric: Normal mood. Normal affect.   Skin: Warm. Dry. No rash.          Labs:    A1C:  Recent Labs   Lab 11/09/23 0614 11/15/24  0603 02/06/25  1839   Hemoglobin A1C 5.4 5.5 5.8 H     CBC:  Recent Labs   Lab 02/06/25 1839 02/07/25  1236 02/08/25  0649 02/09/25 0328   WBC 14.20 H  --  10.66 7.64   RBC 4.86  --  4.09 L 3.73 L   Hemoglobin 14.7  --  12.1 L 11.4 L   POC Hematocrit  --    < >  --   --    Hematocrit 41.4  --  35.8 L 32.9 L   Platelets 211  --  171 173   MCV 85  --  88 88   MCH 30.2  --  29.6 30.6   MCHC 35.5  --  33.8 34.7    < > = values in this interval not displayed.     CMP:  Recent Labs   Lab 11/15/24  0603 02/06/25 1839 02/09/25  0328 04/17/25  1352   Glucose 118 H 129 H   < > 112 H   Calcium 9.4 8.4 L   < > 9.2   Albumin 4.2 3.8  --  3.9   Protein Total  --   --   --  7.1   Total Protein 7.3 6.3  --   --    Sodium 143 134 L   < > 138   Potassium 4.6 2.9 L   < > 3.3 L   CO2 30 H 28   < > 32 H   Chloride 105 95   < > 97   BUN 9 14   < > 16   Creatinine 0.9 0.9   < > 0.9   Alkaline Phosphatase 105 81  --   --    ALP  --   --   --  106   ALT 23 18  --  19   AST 21 19  --  20   Total Bilirubin 0.6 0.4  --   --    Bilirubin Total  --   --   --  0.3    < > = values in this interval not displayed.     LIPIDS:  Recent Labs   Lab 11/10/22  0612 11/09/23  0614 11/15/24  0603 04/17/25  1352   TSH 2.740 2.280 1.880 1.655   HDL 54 46 51  --    Cholesterol 172 173 186  --    Triglycerides 69 106 111  --    LDL Cholesterol  104.2 105.8 112.8  --    HDL/Cholesterol Ratio 31.4 26.6 27.4  --    Non-HDL Cholesterol 118 127 135  --    Total Cholesterol/HDL Ratio 3.2 3.8 3.6  --      TSH:  Recent Labs   Lab 11/09/23  0614 11/15/24  0603 04/17/25  1352   TSH 2.280 1.880 1.655              Assessment and Plan:  1. Hemorrhoids, unspecified hemorrhoid type  - hydrocortisone-pramoxine (ANALPRAM-HC) 2.5-1 % Crea; Place rectally 3 (three) times daily.  Dispense: 30 g; Refill: 1    2. Essential hypertension    3. Hypokalemia  - potassium chloride (MICRO-K) 10 MEQ CpSR; Take 1 capsule (10 mEq total) by mouth once. for 1 dose  Dispense: 1 capsule; Refill: 0    4. Osteopenia, unspecified location     Assessment & Plan      IMPRESSION:  - Assessed hemorrhoid symptoms, noting recent onset likely due to physical therapy exercises.  - Evaluated BP control, noting slight elevation and potential side effects from current medication.  - Reviewed recent lab results showing low potassium levels, likely due to diuretic medication.  - Started once-daily prescription potassium supplementation to address persistent hypokalemia.    ## HEMORRHOIDS:  - Patient has a 10-year history of hemorrhoids with current small, red hemorrhoid appearing last Wednesday evening, located on the edge.  - It appears flat and shriveled like a bean or raisin, not containing blood or clot.  - Previous colonoscopy confirmed hemorrhoids and diverticula in the rectum.  - Current condition likely due to straining from physical therapy exercises.  - Recommend conservative management with prescription-strength hydrocortisone pramoxine cream TID, Tucks pads (witch hazel), and continuation of warm soaks and ice packs.  - Hemorrhoids typically resolve in 2-3 weeks with proper treatment.  - Will consider referral to colorectal surgeon for banding if condition does not improve.  - Patient instructed to contact office if symptoms worsen or bleeding occurs, or message provider if interested in seeing  a colorectal specialist.    ## HYPERTENSION:  - Recent BP readings: 135/84 at home and 132/92 in office.  - While systolic pressure is acceptable, diastolic pressure needs to be consistently in the 80s to minimize cardiovascular risks.  - Current antihypertensive medication (chlorthalidone) may be causing lightheadedness and hypokalemia.  - Patient to continue home BP monitoring and follow up with Dr. Stephens for BP management and potential medication adjustment.    ## HYPOKALEMIA:  - Recent lab results show low potassium level of 3.3, persistent since February.  - Likely causes include diuretic medication and increased exercise.  - Discussed risks of hypokalemia including potential cardiac arrhythmias.  - Prescribed once-daily potassium supplementation and recommended dietary changes to increase potassium intake.  - Will recheck levels with upcoming labs.    ## OSTEOPENIA  - Reviewed recent bone density scan results confirming previous osteoporosis diagnosis.  - Recommend dietary changes and weight-bearing exercises like walking and squats to improve bone density.    ## FALL RISK AND RECOVERY:  - Patient recovering from leg surgery on February 6th following a fall.  - Advised caution regarding fall risk, particularly due to potential orthostatic hypotension from antihypertensive medication.    ## GENERAL HEALTH MANAGEMENT:  - Patient to continue current diet changes and exercise regimen for overall health.              Follow up if symptoms worsen or fail to improve.     This note was generated with the assistance of ambient listening technology. Verbal consent was obtained by the patient and accompanying visitor(s) for the recording of patient appointment to facilitate this note. I attest to having reviewed and edited the generated note for accuracy, though some syntax or spelling errors may persist. Please contact the author of this note for any clarification.       Jane Prajapati, KRISHNAC  [facility]  [place]  4/29/25        [1]   Current Outpatient Medications on File Prior to Visit   Medication Sig Dispense Refill    acetaminophen (TYLENOL) 500 MG tablet Take 2 tablets (1,000 mg total) by mouth every 8 (eight) hours as needed for Pain.      chlorthalidone (HYGROTEN) 25 MG Tab Take 25 mg by mouth once daily.       No current facility-administered medications on file prior to visit.

## 2025-04-30 DIAGNOSIS — E87.6 HYPOKALEMIA: ICD-10-CM

## 2025-04-30 NOTE — TELEPHONE ENCOUNTER
No care due was identified.  [facility] Embedded Care Due Messages. Reference number: [id number].   4/30/2025 1:05:11 PM CDT

## 2025-05-01 RX ORDER — POTASSIUM CHLORIDE 750 MG/1
10 CAPSULE, EXTENDED RELEASE ORAL ONCE
Qty: 1 CAPSULE | Refills: 0 | Status: SHIPPED | OUTPATIENT
Start: 2025-05-01 | End: 2025-05-01

## 2025-05-05 DIAGNOSIS — E87.6 HYPOKALEMIA: ICD-10-CM

## 2025-05-05 RX ORDER — POTASSIUM CHLORIDE 750 MG/1
10 CAPSULE, EXTENDED RELEASE ORAL DAILY
Qty: 30 CAPSULE | Refills: 2 | Status: SHIPPED | OUTPATIENT
Start: 2025-05-05

## 2025-05-08 ENCOUNTER — PATIENT MESSAGE (OUTPATIENT)
Dept: ORTHOPEDICS | Facility: CLINIC | Age: 60
End: 2025-05-08
Payer: COMMERCIAL

## 2025-05-09 DIAGNOSIS — S72.002D CLOSED FRACTURE OF LEFT HIP WITH ROUTINE HEALING, SUBSEQUENT ENCOUNTER: Primary | ICD-10-CM

## 2025-05-09 RX ORDER — AMOXICILLIN 500 MG/1
2000 TABLET, FILM COATED ORAL ONCE
Qty: 4 TABLET | Refills: 0 | Status: SHIPPED | OUTPATIENT
Start: 2025-05-09 | End: 2025-05-09

## 2025-05-22 ENCOUNTER — PATIENT MESSAGE (OUTPATIENT)
Dept: ORTHOPEDICS | Facility: CLINIC | Age: 60
End: 2025-05-22
Payer: COMMERCIAL

## 2025-05-30 ENCOUNTER — PATIENT MESSAGE (OUTPATIENT)
Dept: ORTHOPEDICS | Facility: CLINIC | Age: 60
End: 2025-05-30
Payer: COMMERCIAL

## 2025-06-06 ENCOUNTER — PATIENT MESSAGE (OUTPATIENT)
Dept: ADMINISTRATIVE | Facility: HOSPITAL | Age: 60
End: 2025-06-06
Payer: COMMERCIAL

## 2025-06-16 ENCOUNTER — PATIENT MESSAGE (OUTPATIENT)
Dept: ORTHOPEDICS | Facility: CLINIC | Age: 60
End: 2025-06-16
Payer: COMMERCIAL

## 2025-06-18 ENCOUNTER — PATIENT MESSAGE (OUTPATIENT)
Dept: ADMINISTRATIVE | Facility: HOSPITAL | Age: 60
End: 2025-06-18
Payer: COMMERCIAL

## 2025-06-30 ENCOUNTER — TELEPHONE (OUTPATIENT)
Dept: ADMINISTRATIVE | Facility: HOSPITAL | Age: 60
End: 2025-06-30
Payer: COMMERCIAL

## 2025-06-30 ENCOUNTER — PATIENT MESSAGE (OUTPATIENT)
Dept: ADMINISTRATIVE | Facility: HOSPITAL | Age: 60
End: 2025-06-30
Payer: COMMERCIAL

## 2025-06-30 VITALS — SYSTOLIC BLOOD PRESSURE: 130 MMHG | DIASTOLIC BLOOD PRESSURE: 87 MMHG

## 2025-07-14 ENCOUNTER — OFFICE VISIT (OUTPATIENT)
Dept: ORTHOPEDICS | Facility: CLINIC | Age: 60
End: 2025-07-14
Payer: COMMERCIAL

## 2025-07-14 ENCOUNTER — HOSPITAL ENCOUNTER (OUTPATIENT)
Dept: RADIOLOGY | Facility: HOSPITAL | Age: 60
Discharge: HOME | End: 2025-07-14
Attending: NURSE PRACTITIONER
Payer: COMMERCIAL

## 2025-07-14 VITALS — HEIGHT: 69 IN | BODY MASS INDEX: 27.3 KG/M2 | WEIGHT: 184.31 LBS

## 2025-07-14 DIAGNOSIS — G57.02 PIRIFORMIS SYNDROME OF LEFT SIDE: ICD-10-CM

## 2025-07-14 DIAGNOSIS — S72.002D CLOSED FRACTURE OF LEFT HIP WITH ROUTINE HEALING, SUBSEQUENT ENCOUNTER: Primary | ICD-10-CM

## 2025-07-14 DIAGNOSIS — S72.002D CLOSED FRACTURE OF LEFT HIP WITH ROUTINE HEALING, SUBSEQUENT ENCOUNTER: ICD-10-CM

## 2025-07-14 PROCEDURE — 3008F BODY MASS INDEX DOCD: CPT | Mod: CPTII,S$GLB,, | Performed by: NURSE PRACTITIONER

## 2025-07-14 PROCEDURE — 72170 X-RAY EXAM OF PELVIS: CPT | Mod: TC

## 2025-07-14 PROCEDURE — 4010F ACE/ARB THERAPY RXD/TAKEN: CPT | Mod: CPTII,S$GLB,, | Performed by: NURSE PRACTITIONER

## 2025-07-14 PROCEDURE — 73552 X-RAY EXAM OF FEMUR 2/>: CPT | Mod: 26,LT,, | Performed by: RADIOLOGY

## 2025-07-14 PROCEDURE — 1159F MED LIST DOCD IN RCRD: CPT | Mod: CPTII,S$GLB,, | Performed by: NURSE PRACTITIONER

## 2025-07-14 PROCEDURE — 99214 OFFICE O/P EST MOD 30 MIN: CPT | Mod: S$GLB,,, | Performed by: NURSE PRACTITIONER

## 2025-07-14 PROCEDURE — 1160F RVW MEDS BY RX/DR IN RCRD: CPT | Mod: CPTII,S$GLB,, | Performed by: NURSE PRACTITIONER

## 2025-07-14 PROCEDURE — 3044F HG A1C LEVEL LT 7.0%: CPT | Mod: CPTII,S$GLB,, | Performed by: NURSE PRACTITIONER

## 2025-07-14 PROCEDURE — 73552 X-RAY EXAM OF FEMUR 2/>: CPT | Mod: TC,LT

## 2025-07-14 PROCEDURE — 99999 PR PBB SHADOW E&M-EST. PATIENT-LVL III: CPT | Mod: PBBFAC,,, | Performed by: NURSE PRACTITIONER

## 2025-07-14 PROCEDURE — 72170 X-RAY EXAM OF PELVIS: CPT | Mod: 26,,, | Performed by: RADIOLOGY

## 2025-07-14 RX ORDER — LISINOPRIL 10 MG/1
10 TABLET ORAL DAILY
COMMUNITY
Start: 2025-07-10

## 2025-07-14 NOTE — PROGRESS NOTES
Mr. Keane is here today for a follow up visit after undergoing the following:      ICD-10-CM ICD-9-CM   1. Closed fracture of left hip with routine healing, subsequent encounter s/p IM nailing 2/7/2025  S72.002D V54.13   2. Piriformis syndrome of left side  G57.02 355.0       by Dr. Taylor on 2/7/2025.    Interval History:  He reports that he is doing good.  He states their pain is good.  He is no longer in therapy.  He does endorse some intermittent buttock pain on the left that radiates towards his hip and down his leg.  He denies falls or injuries since his last visit and reports no groin pain.  He is currently not taking any medication for pain.  He has been applying some cool compresses which have helped and has done some stretching .  He reports this injury is a worker's comp case. He denies any falls or injuries since surgery/last seen in the clinic.  He denies fever, chills, and sweats since the time of the surgery.     Physical exam:  The patient's incision is open to air and healed.  He has tactile stimulation to their lower leg, he denies calf pain, there is no leg edema and their pedal pulse is palpable x 2.  Hip ROM is 0-90 degrees.  Knee ROM is 0-120 degrees.  There is no pain with axial loading or log roll.  Mild tenderness over the left buttocks.      RADS: Left femur xray was obtained, findings show Reconfirmed findings of subacute healing internally fixated left femoral inter trochanteric fracture with gamma nail and long stem intramedullary tony. No detrimental changes in the appearance of fracture site, position alignment of fracture fragments, or fixating hardware. No new fractures. Clips project about scrotal soft tissues.     XR Pelvis shows No acute fractures. Degenerative changes lower lumbar spine. Some degenerative changes SI joints. Preserved right hip joint space. Right acetabular roof spurring. Preserved right femoral head contour. Reconfirmed findings of subacute internally fixated  healing left femoral inter trochanteric fracture with gamma nail and partially visualized intramedullary tony. No detrimental changes in the appearance of the fixation hardware or fracture site. Reconfirmed clips about scrotal soft tissues.     Assessment:  6 month follow up    Plan:    ICD-10-CM ICD-9-CM   1. Closed fracture of left hip with routine healing, subsequent encounter s/p IM nailing 2/7/2025  S72.002D V54.13     Current care, treatment plan, precautions, activity level/ modifications, limitations, rehabilitation exercises and proposed future treatment were discussed with the patient. We discussed the need to monitor for changes in symptoms and condition and report them to the physician.  Discussed importance of compliance with all appointments and follow up examinations.       60-year-old male who is 6 months status post IM nailing for a left intertrochanteric femur fracture.  He reports intermittent pain to his left buttocks that radiates towards the side of his leg and down his leg.  He has completed physical therapy.  He has been doing home exercise program and stretching which seems to help.  He has applied ice packs as well which seems to help.  Denies any groin pain.    On clinical exam the patient appears to have piriformis syndrome.  Recommend either resuming physical therapy we will continue home stretching.  Recommend he also try Tylenol and ibuprofen p.r.n. for pain symptoms.  If the symptoms persist he will let me know and we will refer him over to back and spine for evaluation of his lower back.    PHYSICAL THERAPY:   - HEP  - Weight bearing as tolerated   - Range of motion as tolerated.      PAIN MEDICATION:   - Pain medication: refill was not needed.  Suggest Tylenol and/or NSAIDS PRN.  - Pain medication refill policy provided to patient for review, yes.    - Patient was informed a multi-modal approach is used to treat their pain.      FOLLOW UP:   - Patient will follow up in the clinic in  6 months.  - X-ray of his AP pelvis and left femur is needed.    - Future Appointments:   Future Appointments   Date Time Provider Department Center   7/14/2025 11:30 AM Romario Larios NP [facility] [place]   12/5/2025 10:20 AM Sandrine Olivo DO [facility] [place]           If there are any questions prior to scheduled follow up, the patient was instructed to contact the office

## (undated) DEVICE — DRAPE C-ARMOR EQUIPMENT COVER

## (undated) DEVICE — SUT VICRYL PLUS 3-0 SH 18IN

## (undated) DEVICE — GOWN AERO CHROME W/ TOWEL XL

## (undated) DEVICE — DRAPE C-ARM ELAS CLIP 42X120IN

## (undated) DEVICE — DRAPE IOBAN 2 STERI

## (undated) DEVICE — TAPE SURG DURAPORE 2 X10YD

## (undated) DEVICE — DRILL T2 ALPH FREHND 4.2X185MM

## (undated) DEVICE — SUT 0 VICRYL PLUS CT-1 27IN

## (undated) DEVICE — DRAPE TOP 53X102IN

## (undated) DEVICE — TRAY MINOR ORTHO OMC

## (undated) DEVICE — TROCAR

## (undated) DEVICE — SUT MONOCRYL 3-0 PS-2 UND

## (undated) DEVICE — ADHESIVE DERMABOND ADVANCED

## (undated) DEVICE — DRAPE INCISE IOBAN 2 23X17IN

## (undated) DEVICE — SPONGE COTTON TRAY 4X4IN

## (undated) DEVICE — SUT VICRYL PLUS 0 CT1 18IN

## (undated) DEVICE — APPLICATOR CHLORAPREP ORN 26ML

## (undated) DEVICE — GUIDE WIRE 3.0X1000MM BALL TIP
Type: IMPLANTABLE DEVICE | Site: FEMUR | Status: NON-FUNCTIONAL
Removed: 2025-02-07

## (undated) DEVICE — DRESSING AQUACEL AG RBBN 2X45

## (undated) DEVICE — KIT PT CARE HANA PROFX SSXT

## (undated) DEVICE — THREADED RECON K-WIRE
Type: IMPLANTABLE DEVICE | Site: FEMUR | Status: NON-FUNCTIONAL
Removed: 2025-02-07

## (undated) DEVICE — DRAPE U SPLIT SHEET 54X76IN

## (undated) DEVICE — DRAPE THREE-QTR REINF 53X77IN

## (undated) DEVICE — REAMER SHAFT MOD TRINKLE 8X510

## (undated) DEVICE — DRAPE STERI U-SHAPED 47X51IN